# Patient Record
Sex: FEMALE | Race: WHITE | HISPANIC OR LATINO | Employment: FULL TIME | URBAN - METROPOLITAN AREA
[De-identification: names, ages, dates, MRNs, and addresses within clinical notes are randomized per-mention and may not be internally consistent; named-entity substitution may affect disease eponyms.]

---

## 2019-10-18 ENCOUNTER — OFFICE VISIT (OUTPATIENT)
Dept: INTERNAL MEDICINE | Facility: CLINIC | Age: 44
End: 2019-10-18
Payer: COMMERCIAL

## 2019-10-18 VITALS
SYSTOLIC BLOOD PRESSURE: 116 MMHG | HEART RATE: 81 BPM | OXYGEN SATURATION: 98 % | DIASTOLIC BLOOD PRESSURE: 82 MMHG | TEMPERATURE: 98 F | WEIGHT: 158.06 LBS

## 2019-10-18 DIAGNOSIS — Z85.850 HISTORY OF THYROID CANCER: ICD-10-CM

## 2019-10-18 DIAGNOSIS — Z00.00 ROUTINE GENERAL MEDICAL EXAMINATION AT A HEALTH CARE FACILITY: Primary | ICD-10-CM

## 2019-10-18 DIAGNOSIS — G35 MS (MULTIPLE SCLEROSIS): ICD-10-CM

## 2019-10-18 DIAGNOSIS — Z23 NEED FOR INFLUENZA VACCINATION: ICD-10-CM

## 2019-10-18 DIAGNOSIS — E89.0 POSTOPERATIVE HYPOTHYROIDISM: ICD-10-CM

## 2019-10-18 DIAGNOSIS — Z12.31 ENCOUNTER FOR SCREENING MAMMOGRAM FOR BREAST CANCER: ICD-10-CM

## 2019-10-18 PROCEDURE — 90686 FLU VACCINE (QUAD) GREATER THAN OR EQUAL TO 3YO PRESERVATIVE FREE IM: ICD-10-PCS | Mod: S$GLB,,, | Performed by: INTERNAL MEDICINE

## 2019-10-18 PROCEDURE — 90471 IMMUNIZATION ADMIN: CPT | Mod: S$GLB,,, | Performed by: INTERNAL MEDICINE

## 2019-10-18 PROCEDURE — 99386 PR PREVENTIVE VISIT,NEW,40-64: ICD-10-PCS | Mod: 25,S$GLB,, | Performed by: INTERNAL MEDICINE

## 2019-10-18 PROCEDURE — 90471 FLU VACCINE (QUAD) GREATER THAN OR EQUAL TO 3YO PRESERVATIVE FREE IM: ICD-10-PCS | Mod: S$GLB,,, | Performed by: INTERNAL MEDICINE

## 2019-10-18 PROCEDURE — 99999 PR PBB SHADOW E&M-NEW PATIENT-LVL IV: CPT | Mod: PBBFAC,,, | Performed by: INTERNAL MEDICINE

## 2019-10-18 PROCEDURE — 99386 PREV VISIT NEW AGE 40-64: CPT | Mod: 25,S$GLB,, | Performed by: INTERNAL MEDICINE

## 2019-10-18 PROCEDURE — 90686 IIV4 VACC NO PRSV 0.5 ML IM: CPT | Mod: S$GLB,,, | Performed by: INTERNAL MEDICINE

## 2019-10-18 PROCEDURE — 99999 PR PBB SHADOW E&M-NEW PATIENT-LVL IV: ICD-10-PCS | Mod: PBBFAC,,, | Performed by: INTERNAL MEDICINE

## 2019-10-18 RX ORDER — GABAPENTIN 300 MG/1
300 CAPSULE ORAL 3 TIMES DAILY
COMMUNITY
End: 2019-12-30 | Stop reason: SDUPTHER

## 2019-10-18 RX ORDER — LEVOTHYROXINE SODIUM 200 UG/1
200 TABLET ORAL DAILY
COMMUNITY
End: 2019-10-18 | Stop reason: SDUPTHER

## 2019-10-18 RX ORDER — LEVOTHYROXINE SODIUM 200 UG/1
200 TABLET ORAL DAILY
Qty: 90 TABLET | Refills: 1 | Status: SHIPPED | OUTPATIENT
Start: 2019-10-18 | End: 2019-11-18 | Stop reason: SDUPTHER

## 2019-10-18 RX ORDER — BACLOFEN 20 MG/1
20 TABLET ORAL DAILY PRN
Qty: 30 TABLET | Refills: 5 | Status: SHIPPED | OUTPATIENT
Start: 2019-10-18 | End: 2019-12-30 | Stop reason: SDUPTHER

## 2019-10-18 NOTE — PROGRESS NOTES
Subjective:      Patient ID: Nisha Mancuso is a 44 y.o. female.    Chief Complaint: Establish Care    HPI   43 yo with   Patient Active Problem List   Diagnosis    MS (multiple sclerosis)    History of thyroid cancer    Postoperative hypothyroidism     Past Medical History:   Diagnosis Date    Multiple sclerosis     Thyroid cancer      Here today for annual prev exam.  Compliant with meds without significant side effects. Energy and appetite are good.     No f/h of colon or breast cancer. Non smoker.     Review of Systems   Constitutional: Negative for chills and fever.   HENT: Negative for ear pain and sore throat.    Respiratory: Negative for cough.    Cardiovascular: Negative for chest pain.   Gastrointestinal: Negative for abdominal pain and blood in stool.   Genitourinary: Negative for dysuria and hematuria.   Neurological: Negative for seizures and syncope.     Objective:   /82 (BP Location: Left arm, Patient Position: Sitting, BP Method: Medium (Manual))   Pulse 81   Temp 98 °F (36.7 °C) (Tympanic)   Wt 71.7 kg (158 lb 1.1 oz)   LMP 10/01/2019   SpO2 98%     Physical Exam   Constitutional: She is oriented to person, place, and time. She appears well-developed and well-nourished. No distress.   HENT:   Head: Normocephalic and atraumatic.   Mouth/Throat: Oropharynx is clear and moist.   Eyes: Pupils are equal, round, and reactive to light. EOM are normal.   Neck: Neck supple. No thyromegaly present.   Cardiovascular: Normal rate and regular rhythm.   Pulmonary/Chest: Breath sounds normal. She has no wheezes. She has no rales.   Abdominal: Soft. Bowel sounds are normal. There is no tenderness.   Lymphadenopathy:     She has no cervical adenopathy.   Neurological: She is alert and oriented to person, place, and time.   Skin: Skin is warm and dry.   Psychiatric: She has a normal mood and affect. Her behavior is normal.       Assessment:     1. Routine general medical examination at a [facility]  facility    2. MS (multiple sclerosis)    3. Need for influenza vaccination    4. Encounter for screening mammogram for breast cancer    5. History of thyroid cancer    6. Postoperative hypothyroidism      Plan:   Routine general medical examination at a health care facility  Heart healthy diet and regular exercise.  Health maintenance reviewed with patient  -     Comprehensive metabolic panel; Future; Expected date: 10/18/2019  -     CBC auto differential; Future; Expected date: 10/18/2019  -     TSH; Future; Expected date: 10/18/2019  -     Lipid panel; Future; Expected date: 10/18/2019  -     Hemoglobin A1c; Future; Expected date: 10/18/2019  -     Ambulatory referral to Gynecology    MS (multiple sclerosis)  Stable for years.  Has neuro appointment schedule  Need for influenza vaccination    Encounter for screening mammogram for breast cancer  -     Mammo Digital Screening Bilat; Future; Expected date: 10/18/2019    History of thyroid cancer  -     Ambulatory Referral to Oncology    Postoperative hypothyroidism  Reports stable on current dose of thyroid medication for years.    Other orders  -     levothyroxine (SYNTHROID) 200 MCG tablet; Take 1 tablet (200 mcg total) by mouth once daily.  Dispense: 90 tablet; Refill: 1  -     baclofen (LIORESAL) 20 MG tablet; Take 1 tablet (20 mg total) by mouth daily as needed (muscle spasm).  Dispense: 30 tablet; Refill: 05        Lab Frequency Next Occurrence   Comprehensive metabolic panel Once 10/18/2019   CBC auto differential Once 10/18/2019   TSH Once 10/18/2019   Lipid panel Once 10/18/2019   Hemoglobin A1c Once 10/18/2019       Problem List Items Addressed This Visit        Neuro    MS (multiple sclerosis)    Overview     Dx 2012            Oncology    History of thyroid cancer    Relevant Orders    Ambulatory Referral to Oncology       Endocrine    Postoperative hypothyroidism    Current Assessment & Plan     Reports stable on 200mcg for years.            Other Visit  Diagnoses     Routine general medical examination at a health care facility    -  Primary    Relevant Orders    Comprehensive metabolic panel    CBC auto differential    TSH    Lipid panel    Hemoglobin A1c    Ambulatory referral to Gynecology    Need for influenza vaccination        Encounter for screening mammogram for breast cancer        Relevant Orders    Mammo Digital Screening Bilat          Follow up in about 6 months (around 4/18/2020), or if symptoms worsen or fail to improve.

## 2019-10-21 ENCOUNTER — LAB VISIT (OUTPATIENT)
Dept: LAB | Facility: HOSPITAL | Age: 44
End: 2019-10-21
Attending: INTERNAL MEDICINE
Payer: COMMERCIAL

## 2019-10-21 DIAGNOSIS — Z00.00 ROUTINE GENERAL MEDICAL EXAMINATION AT A HEALTH CARE FACILITY: ICD-10-CM

## 2019-10-21 LAB
ALBUMIN SERPL BCP-MCNC: 3.9 G/DL (ref 3.5–5.2)
ALP SERPL-CCNC: 70 U/L (ref 55–135)
ALT SERPL W/O P-5'-P-CCNC: 13 U/L (ref 10–44)
ANION GAP SERPL CALC-SCNC: 7 MMOL/L (ref 8–16)
AST SERPL-CCNC: 18 U/L (ref 10–40)
BASOPHILS # BLD AUTO: 0.02 K/UL (ref 0–0.2)
BASOPHILS NFR BLD: 0.3 % (ref 0–1.9)
BILIRUB SERPL-MCNC: 0.3 MG/DL (ref 0.1–1)
BUN SERPL-MCNC: 15 MG/DL (ref 6–20)
CALCIUM SERPL-MCNC: 9.5 MG/DL (ref 8.7–10.5)
CHLORIDE SERPL-SCNC: 106 MMOL/L (ref 95–110)
CHOLEST SERPL-MCNC: 211 MG/DL (ref 120–199)
CHOLEST/HDLC SERPL: 3.4 {RATIO} (ref 2–5)
CO2 SERPL-SCNC: 27 MMOL/L (ref 23–29)
CREAT SERPL-MCNC: 0.8 MG/DL (ref 0.5–1.4)
DIFFERENTIAL METHOD: ABNORMAL
EOSINOPHIL # BLD AUTO: 0.1 K/UL (ref 0–0.5)
EOSINOPHIL NFR BLD: 2 % (ref 0–8)
ERYTHROCYTE [DISTWIDTH] IN BLOOD BY AUTOMATED COUNT: 13.8 % (ref 11.5–14.5)
EST. GFR  (AFRICAN AMERICAN): >60 ML/MIN/1.73 M^2
EST. GFR  (NON AFRICAN AMERICAN): >60 ML/MIN/1.73 M^2
ESTIMATED AVG GLUCOSE: 103 MG/DL (ref 68–131)
GLUCOSE SERPL-MCNC: 83 MG/DL (ref 70–110)
HBA1C MFR BLD HPLC: 5.2 % (ref 4–5.6)
HCT VFR BLD AUTO: 39.2 % (ref 37–48.5)
HDLC SERPL-MCNC: 62 MG/DL (ref 40–75)
HDLC SERPL: 29.4 % (ref 20–50)
HGB BLD-MCNC: 12.3 G/DL (ref 12–16)
IMM GRANULOCYTES # BLD AUTO: 0.01 K/UL (ref 0–0.04)
IMM GRANULOCYTES NFR BLD AUTO: 0.2 % (ref 0–0.5)
LDLC SERPL CALC-MCNC: 133 MG/DL (ref 63–159)
LYMPHOCYTES # BLD AUTO: 1.4 K/UL (ref 1–4.8)
LYMPHOCYTES NFR BLD: 22.4 % (ref 18–48)
MCH RBC QN AUTO: 29.3 PG (ref 27–31)
MCHC RBC AUTO-ENTMCNC: 31.4 G/DL (ref 32–36)
MCV RBC AUTO: 93 FL (ref 82–98)
MONOCYTES # BLD AUTO: 0.6 K/UL (ref 0.3–1)
MONOCYTES NFR BLD: 10 % (ref 4–15)
NEUTROPHILS # BLD AUTO: 4 K/UL (ref 1.8–7.7)
NEUTROPHILS NFR BLD: 65.1 % (ref 38–73)
NONHDLC SERPL-MCNC: 149 MG/DL
NRBC BLD-RTO: 0 /100 WBC
PLATELET # BLD AUTO: 303 K/UL (ref 150–350)
PMV BLD AUTO: 10 FL (ref 9.2–12.9)
POTASSIUM SERPL-SCNC: 4.4 MMOL/L (ref 3.5–5.1)
PROT SERPL-MCNC: 7.9 G/DL (ref 6–8.4)
RBC # BLD AUTO: 4.2 M/UL (ref 4–5.4)
SODIUM SERPL-SCNC: 140 MMOL/L (ref 136–145)
T4 FREE SERPL-MCNC: 1.38 NG/DL (ref 0.71–1.51)
TRIGL SERPL-MCNC: 80 MG/DL (ref 30–150)
TSH SERPL DL<=0.005 MIU/L-ACNC: 0.32 UIU/ML (ref 0.4–4)
WBC # BLD AUTO: 6.12 K/UL (ref 3.9–12.7)

## 2019-10-21 PROCEDURE — 84439 ASSAY OF FREE THYROXINE: CPT

## 2019-10-21 PROCEDURE — 36415 COLL VENOUS BLD VENIPUNCTURE: CPT

## 2019-10-21 PROCEDURE — 83036 HEMOGLOBIN GLYCOSYLATED A1C: CPT

## 2019-10-21 PROCEDURE — 85025 COMPLETE CBC W/AUTO DIFF WBC: CPT

## 2019-10-21 PROCEDURE — 80061 LIPID PANEL: CPT

## 2019-10-21 PROCEDURE — 80053 COMPREHEN METABOLIC PANEL: CPT

## 2019-10-21 PROCEDURE — 84443 ASSAY THYROID STIM HORMONE: CPT

## 2019-10-28 ENCOUNTER — TELEPHONE (OUTPATIENT)
Dept: INTERNAL MEDICINE | Facility: CLINIC | Age: 44
End: 2019-10-28

## 2019-10-28 DIAGNOSIS — R79.89 ABNORMAL TSH: Primary | ICD-10-CM

## 2019-11-08 ENCOUNTER — PATIENT MESSAGE (OUTPATIENT)
Dept: INTERNAL MEDICINE | Facility: CLINIC | Age: 44
End: 2019-11-08

## 2019-11-18 ENCOUNTER — OFFICE VISIT (OUTPATIENT)
Dept: OBSTETRICS AND GYNECOLOGY | Facility: CLINIC | Age: 44
End: 2019-11-18
Payer: COMMERCIAL

## 2019-11-18 ENCOUNTER — INITIAL CONSULT (OUTPATIENT)
Dept: HEMATOLOGY/ONCOLOGY | Facility: CLINIC | Age: 44
End: 2019-11-18
Payer: COMMERCIAL

## 2019-11-18 VITALS
TEMPERATURE: 98 F | WEIGHT: 157.88 LBS | SYSTOLIC BLOOD PRESSURE: 132 MMHG | HEART RATE: 83 BPM | DIASTOLIC BLOOD PRESSURE: 78 MMHG | BODY MASS INDEX: 27.09 KG/M2

## 2019-11-18 VITALS
BODY MASS INDEX: 27.03 KG/M2 | HEIGHT: 64 IN | SYSTOLIC BLOOD PRESSURE: 120 MMHG | WEIGHT: 158.31 LBS | DIASTOLIC BLOOD PRESSURE: 60 MMHG

## 2019-11-18 DIAGNOSIS — Z01.419 ENCOUNTER FOR WELL WOMAN EXAM WITH ROUTINE GYNECOLOGICAL EXAM: Primary | ICD-10-CM

## 2019-11-18 DIAGNOSIS — Z85.850 HISTORY OF THYROID CANCER: ICD-10-CM

## 2019-11-18 PROCEDURE — 99386 PR PREVENTIVE VISIT,NEW,40-64: ICD-10-PCS | Mod: S$GLB,,, | Performed by: OBSTETRICS & GYNECOLOGY

## 2019-11-18 PROCEDURE — 99999 PR PBB SHADOW E&M-EST. PATIENT-LVL III: CPT | Mod: PBBFAC,,, | Performed by: OBSTETRICS & GYNECOLOGY

## 2019-11-18 PROCEDURE — 3008F BODY MASS INDEX DOCD: CPT | Mod: CPTII,S$GLB,, | Performed by: INTERNAL MEDICINE

## 2019-11-18 PROCEDURE — 88175 CYTOPATH C/V AUTO FLUID REDO: CPT

## 2019-11-18 PROCEDURE — 99999 PR PBB SHADOW E&M-EST. PATIENT-LVL III: CPT | Mod: PBBFAC,,, | Performed by: INTERNAL MEDICINE

## 2019-11-18 PROCEDURE — 99386 PREV VISIT NEW AGE 40-64: CPT | Mod: S$GLB,,, | Performed by: OBSTETRICS & GYNECOLOGY

## 2019-11-18 PROCEDURE — 3008F PR BODY MASS INDEX (BMI) DOCUMENTED: ICD-10-PCS | Mod: CPTII,S$GLB,, | Performed by: INTERNAL MEDICINE

## 2019-11-18 PROCEDURE — 99999 PR PBB SHADOW E&M-EST. PATIENT-LVL III: ICD-10-PCS | Mod: PBBFAC,,, | Performed by: OBSTETRICS & GYNECOLOGY

## 2019-11-18 PROCEDURE — 99205 OFFICE O/P NEW HI 60 MIN: CPT | Mod: S$GLB,,, | Performed by: INTERNAL MEDICINE

## 2019-11-18 PROCEDURE — 99999 PR PBB SHADOW E&M-EST. PATIENT-LVL III: ICD-10-PCS | Mod: PBBFAC,,, | Performed by: INTERNAL MEDICINE

## 2019-11-18 PROCEDURE — 99205 PR OFFICE/OUTPT VISIT, NEW, LEVL V, 60-74 MIN: ICD-10-PCS | Mod: S$GLB,,, | Performed by: INTERNAL MEDICINE

## 2019-11-18 NOTE — PROGRESS NOTES
Subjective:   Date of Visit: 11/18/19   ?   ?    REFERRING PROVIDER: Kit Apple MD  [address] [place] [address]   ?   CHIEF COMPLAINT:  History of thyroid cancer???????   ?   ONCOLOGIC DIAGNOSIS:  Thyroid cancer  ?   CURRENT TREATMENT:  None    PAST TREATMENT:  Total thyroidectomy, status post radioactive iodine treatment  ?   ONCOLOGIC HISTORY:     Thyroid cancer -2011.  Status post total thyroidectomy and radioactive iodine.      HPI:   was seen at [facility] today.  She is a 44-year-old female who was referred to us by  due to her prior history of thyroid cancer.  The patient stated that in 2011 she was 3 months pregnant with her daughter when she was diagnosed with thyroid cancer.  She subsequently underwent surgery and radioactive iodine treatment with complete response.  She was lost to follow-up.  She was recently seen by her primary care physician who recommended that she follows up with the Hematology and Oncology Clinic.    She continues to take levothyroxine but claims that over the past few months she has observed increased dryness of scan, head loss, abdominal pain and bloating, cold intolerance and difficulty with sleeping.  She denies palpitation, chest pain or shortness of breath, unintentional weight loss, dysphagia, odynophagia, diarrhea or dysuria.    Review of Systems   Constitutional: Negative for activity change, appetite change, chills, fatigue, fever and unexpected weight change.   HENT: Negative for hearing loss, mouth sores, nosebleeds, sore throat, tinnitus, trouble swallowing and voice change.    Eyes: Negative for visual disturbance.   Respiratory: Negative for cough, chest tightness and shortness of breath.    Cardiovascular: Negative for chest pain, palpitations and leg swelling.   Gastrointestinal: Positive for constipation. Negative for abdominal pain, anal bleeding, blood in stool, diarrhea, nausea and vomiting.        Abdominal bloating,  constipation.   Endocrine: Positive for cold intolerance.   Genitourinary: Negative for dysuria, frequency, hematuria, pelvic pain, vaginal bleeding and vaginal pain.   Musculoskeletal: Negative for arthralgias, back pain, joint swelling and neck pain.   Skin: Negative for color change, pallor, rash and wound.        Dry skin   Allergic/Immunologic: Negative for immunocompromised state.   Neurological: Negative for dizziness, tremors, syncope, speech difficulty, weakness, light-headedness and headaches.   Hematological: Negative for adenopathy. Does not bruise/bleed easily.   Psychiatric/Behavioral: Negative for agitation, confusion, decreased concentration, hallucinations and sleep disturbance. The patient is not nervous/anxious.        ?   PAST MEDICAL HISTORY:   Past Medical History:   Diagnosis Date    Multiple sclerosis     Thyroid cancer     ?     PAST SURGICAL HISTORY:   Past Surgical History:   Procedure Laterality Date    THYROIDECTOMY        ?   ALLERGIES:   Allergies as of 11/18/2019    (No Known Allergies)      ?   MEDICATIONS:?   No outpatient medications have been marked as taking for the 11/18/19 encounter (Initial consult) with Souleymane Sanchez MD.      ?   SOCIAL HISTORY:?   Social History     Tobacco Use    Smoking status: Never Smoker    Smokeless tobacco: Never Used   Substance Use Topics    Alcohol use: Not Currently        ?   FAMILY HISTORY:   family history includes Arthritis in her mother; Hypertension in her father.   ?     Objective:      Physical Exam   Constitutional: She is oriented to person, place, and time. She appears well-developed and well-nourished. She is cooperative.  Non-toxic appearance. She does not appear ill. No distress.   HENT:   Head: Normocephalic and atraumatic.   Mouth/Throat: No oropharyngeal exudate.   Eyes: Pupils are equal, round, and reactive to light. Conjunctivae are normal. Right eye exhibits no discharge. Left eye exhibits no discharge. No scleral  icterus.   Neck: Normal range of motion. Neck supple. No thyromegaly present.   Cardiovascular: Normal rate and regular rhythm.   No murmur heard.  Pulmonary/Chest: Effort normal and breath sounds normal. No respiratory distress. She exhibits no tenderness.   Abdominal: Soft. Bowel sounds are normal. She exhibits no distension and no mass. There is no tenderness. There is no rebound and no guarding.   Musculoskeletal: Normal range of motion. She exhibits no edema or tenderness.   Lymphadenopathy:     She has no cervical adenopathy.        Right cervical: No superficial cervical adenopathy present.       Left cervical: No superficial cervical adenopathy present.        Right axillary: No pectoral adenopathy present.        Left axillary: No pectoral adenopathy present.       Right: No inguinal and no supraclavicular adenopathy present.        Left: No inguinal and no supraclavicular adenopathy present.   Neurological: She is alert and oriented to person, place, and time. No cranial nerve deficit or sensory deficit.   Skin: Skin is warm and dry. Capillary refill takes 2 to 3 seconds. No rash noted. No erythema. No pallor.   Psychiatric: She has a normal mood and affect. Her behavior is normal. Judgment normal.       ?   Vitals:    11/18/19 1600   BP: 132/78   Pulse: 83   Temp: 98.1 °F (36.7 °C)        ECOG SCORE    0 - Fully active-able to carry on all pre-disease performance without restriction         ?   Laboratory:  ?   No visits with results within 1 Day(s) from this visit.   Latest known visit with results is:   Lab Visit on 10/21/2019   Component Date Value Ref Range Status    Sodium 10/21/2019 140  136 - 145 mmol/L Final    Potassium 10/21/2019 4.4  3.5 - 5.1 mmol/L Final    Chloride 10/21/2019 106  95 - 110 mmol/L Final    CO2 10/21/2019 27  23 - 29 mmol/L Final    Glucose 10/21/2019 83  70 - 110 mg/dL Final    BUN, Bld 10/21/2019 15  6 - 20 mg/dL Final    Creatinine 10/21/2019 0.8  0.5 - 1.4 mg/dL  Final    Calcium 10/21/2019 9.5  8.7 - 10.5 mg/dL Final    Total Protein 10/21/2019 7.9  6.0 - 8.4 g/dL Final    Albumin 10/21/2019 3.9  3.5 - 5.2 g/dL Final    Total Bilirubin 10/21/2019 0.3  0.1 - 1.0 mg/dL Final    Alkaline Phosphatase 10/21/2019 70  55 - 135 U/L Final    AST 10/21/2019 18  10 - 40 U/L Final    ALT 10/21/2019 13  10 - 44 U/L Final    Anion Gap 10/21/2019 7* 8 - 16 mmol/L Final    eGFR if African American 10/21/2019 >60.0  >60 mL/min/1.73 m^2 Final    eGFR if non African American 10/21/2019 >60.0  >60 mL/min/1.73 m^2 Final    WBC 10/21/2019 6.12  3.90 - 12.70 K/uL Final    RBC 10/21/2019 4.20  4.00 - 5.40 M/uL Final    Hemoglobin 10/21/2019 12.3  12.0 - 16.0 g/dL Final    Hematocrit 10/21/2019 39.2  37.0 - 48.5 % Final    Mean Corpuscular Volume 10/21/2019 93  82 - 98 fL Final    Mean Corpuscular Hemoglobin 10/21/2019 29.3  27.0 - 31.0 pg Final    Mean Corpuscular Hemoglobin Conc 10/21/2019 31.4* 32.0 - 36.0 g/dL Final    RDW 10/21/2019 13.8  11.5 - 14.5 % Final    Platelets 10/21/2019 303  150 - 350 K/uL Final    MPV 10/21/2019 10.0  9.2 - 12.9 fL Final    Immature Granulocytes 10/21/2019 0.2  0.0 - 0.5 % Final    Gran # (ANC) 10/21/2019 4.0  1.8 - 7.7 K/uL Final    Immature Grans (Abs) 10/21/2019 0.01  0.00 - 0.04 K/uL Final    Lymph # 10/21/2019 1.4  1.0 - 4.8 K/uL Final    Mono # 10/21/2019 0.6  0.3 - 1.0 K/uL Final    Eos # 10/21/2019 0.1  0.0 - 0.5 K/uL Final    Baso # 10/21/2019 0.02  0.00 - 0.20 K/uL Final    nRBC 10/21/2019 0  0 /100 WBC Final    Gran% 10/21/2019 65.1  38.0 - 73.0 % Final    Lymph% 10/21/2019 22.4  18.0 - 48.0 % Final    Mono% 10/21/2019 10.0  4.0 - 15.0 % Final    Eosinophil% 10/21/2019 2.0  0.0 - 8.0 % Final    Basophil% 10/21/2019 0.3  0.0 - 1.9 % Final    Differential Method 10/21/2019 Automated   Final    TSH 10/21/2019 0.321* 0.400 - 4.000 uIU/mL Final    Cholesterol 10/21/2019 211* 120 - 199 mg/dL Final    Triglycerides  10/21/2019 80  30 - 150 mg/dL Final    HDL 10/21/2019 62  40 - 75 mg/dL Final    LDL Cholesterol 10/21/2019 133.0  63.0 - 159.0 mg/dL Final    Hdl/Cholesterol Ratio 10/21/2019 29.4  20.0 - 50.0 % Final    Total Cholesterol/HDL Ratio 10/21/2019 3.4  2.0 - 5.0 Final    Non-HDL Cholesterol 10/21/2019 149  mg/dL Final    Hemoglobin A1C 10/21/2019 5.2  4.0 - 5.6 % Final    Estimated Avg Glucose 10/21/2019 103  68 - 131 mg/dL Final    Free T4 10/21/2019 1.38  0.71 - 1.51 ng/dL Final      ?   Tumor markers   ?   ?   Imaging: No image results found.     ?    Pathology:  Pathology Results  (Last 10 years)    None         ?   Assessment/Plan:       1. History of thyroid cancer          ?History of thyroid cancer  Will sign a release of information form to obtain prior records regarding thyroid cancer and management.  Will order ultrasound of the neck and soft tissue.  Check CBC and CMP.    Currently on levothyroxine 200 mcg daily, management per PCP.     ?   ?   Follow-Up: Follow up in about 4 weeks (around 12/16/2019).    FUENTES CUMMINGS Md., Ph.D  Hematology & Oncology Department  Phone #: [phone]

## 2019-11-18 NOTE — PROGRESS NOTES
"Subjective:      Nisha Mancuso is a 44 y.o. female who presents for an annual exam. The patient has no complaints today. The patient is sexually active. GYN screening history: last pap: patient does not recall when last pap was and last mammogram: approximate date 2 years ago and was normal. The patient wears seatbelts: yes. The patient participates in regular exercise: yes. Has the patient ever been transfused or tattooed?: not asked. The patient reports that there is not domestic violence in her life. [facility].  Patient has regular periods, patient is not using any contraception.   is in [facility], distance and abstinece.  No vasectomy, uses natural family planning.  Patient has MS, has had it for 7 years.  Patient has had cancer and MS. Patient has a neurologist here, will be seen 12/30/2019.        Menstrual History:  OB History    None        Menarche age: not asked  Patient's last menstrual period was 10/02/2019 (exact date).           Review of Systems  Constitutional: negative  Eyes: negative  Ears, nose, mouth, throat, and face: negative  Respiratory: negative  Cardiovascular: negative  Gastrointestinal: negative  Genitourinary:negative  Integument/breast: negative  Hematologic/lymphatic: negative  Musculoskeletal:negative  Neurological: negative  Behavioral/Psych: negative  Endocrine: negative  Allergic/Immunologic: negative      Objective:      /60   Ht 5' 4" (1.626 m)   Wt 71.8 kg (158 lb 4.6 oz)   LMP 10/02/2019 (Exact Date)   BMI 27.17 kg/m²   General appearance: alert, appears stated age and cooperative  Head: Normocephalic, without obvious abnormality, atraumatic  Eyes: negative  Nose: no discharge  Neck: no adenopathy, no carotid bruit, no JVD, supple, symmetrical, trachea midline and thyroid not enlarged, symmetric, no tenderness/mass/nodules  Lungs: clear to auscultation bilaterally  Heart: S1, S2 normal and " no S3 or S4  Abdomen: soft, non-tender; bowel sounds normal; no masses,  no organomegaly  Pelvic: cervix normal in appearance, external genitalia normal, no adnexal masses or tenderness, no bladder tenderness, no cervical motion tenderness, perianal skin: no external genital warts noted, rectovaginal septum normal, uterus normal size, shape, and consistency, uterus surgically absent and vagina normal without discharge  Extremities: extremities normal, atraumatic, no cyanosis or edema  Skin: Skin color, texture, turgor normal. No rashes or lesions  Neurologic: Grossly normal.      Assessment:      Healthy female exam.      Plan:       Await pap smear results.  Contraception: rhythm method.  Thin prep Pap smear.  mammogram ordered by primary care physician

## 2019-11-18 NOTE — ASSESSMENT & PLAN NOTE
Will sign a release of information form to obtain prior records regarding thyroid cancer and management.  Will order ultrasound of the neck and soft tissue.  Check CBC and CMP.    Currently on levothyroxine 200 mcg daily, management per PCP.

## 2019-11-18 NOTE — LETTER
November 18, 2019      Kit Apple MD  [address] [facility] [place] [address]           [facility]  [address] [facility] [place] [address]  Phone: [phone]  Fax: [phone]          Patient: Nisha Mancuso   MR Number: [id number]   YOB: 1975   Date of Visit: 11/18/2019       Dear Dr. Kit Apple:    Thank you for referring Nisha Mancuso to me for evaluation. Attached you will find relevant portions of my assessment and plan of care.    If you have questions, please do not hesitate to call me. I look forward to following Nisha Mancuso along with you.    Sincerely,    Souleymane Sanchez MD    Enclosure  CC:  No Recipients    If you would like to receive this communication electronically, please contact [email or web address] or [phone] to request more information on [employer] Link access.    For providers and/or their staff who would like to refer a patient to [facility], please contact us through our one-stop-shop provider referral line, [facility], at [phone].    If you feel you have received this communication in error or would no longer like to receive these types of communications, please e-mail [email or web address]

## 2019-11-18 NOTE — LETTER
November 18, 2019      Kit Apple MD  [address] [facility]  [place] [address]           [facility]  [address] [facility] [place] [address]  Phone: [phone]  Fax: [phone]          Patient: Nisha Mancuso   MR Number: [id number]   YOB: 1975   Date of Visit: 11/18/2019       Dear Dr. Kit Apple:    Thank you for referring Nisha Mancuso to me for evaluation. Attached you will find relevant portions of my assessment and plan of care.    If you have questions, please do not hesitate to call me. I look forward to following Nisha Mancuso along with you.    Sincerely,    Shoshana Madera MD    Enclosure  CC:  No Recipients    If you would like to receive this communication electronically, please contact [email or web address] or [phone] to request more information on [employer] Link access.    For providers and/or their staff who would like to refer a patient to [facility], please contact us through our one-stop-shop provider referral line, [facility], at [phone].    If you feel you have received this communication in error or would no longer like to receive these types of communications, please e-mail [email or web address]

## 2019-11-20 RX ORDER — LEVOTHYROXINE SODIUM 200 UG/1
200 TABLET ORAL DAILY
Qty: 90 TABLET | Refills: 1 | Status: SHIPPED | OUTPATIENT
Start: 2019-11-20 | End: 2019-11-22

## 2019-11-22 ENCOUNTER — TELEPHONE (OUTPATIENT)
Dept: ENDOCRINOLOGY | Facility: CLINIC | Age: 44
End: 2019-11-22

## 2019-11-22 ENCOUNTER — OFFICE VISIT (OUTPATIENT)
Dept: ENDOCRINOLOGY | Facility: CLINIC | Age: 44
End: 2019-11-22
Payer: COMMERCIAL

## 2019-11-22 VITALS — DIASTOLIC BLOOD PRESSURE: 77 MMHG | HEART RATE: 77 BPM | TEMPERATURE: 98 F | SYSTOLIC BLOOD PRESSURE: 124 MMHG

## 2019-11-22 DIAGNOSIS — Z85.850 HISTORY OF THYROID CANCER: ICD-10-CM

## 2019-11-22 DIAGNOSIS — R63.5 WEIGHT GAIN: ICD-10-CM

## 2019-11-22 DIAGNOSIS — E89.0 POSTOPERATIVE HYPOTHYROIDISM: Primary | ICD-10-CM

## 2019-11-22 PROCEDURE — 99999 PR PBB SHADOW E&M-EST. PATIENT-LVL III: CPT | Mod: PBBFAC,,, | Performed by: INTERNAL MEDICINE

## 2019-11-22 PROCEDURE — 99203 OFFICE O/P NEW LOW 30 MIN: CPT | Mod: S$GLB,,, | Performed by: INTERNAL MEDICINE

## 2019-11-22 PROCEDURE — 99203 PR OFFICE/OUTPT VISIT, NEW, LEVL III, 30-44 MIN: ICD-10-PCS | Mod: S$GLB,,, | Performed by: INTERNAL MEDICINE

## 2019-11-22 PROCEDURE — 99999 PR PBB SHADOW E&M-EST. PATIENT-LVL III: ICD-10-PCS | Mod: PBBFAC,,, | Performed by: INTERNAL MEDICINE

## 2019-11-22 RX ORDER — LEVOTHYROXINE SODIUM 200 UG/1
TABLET ORAL
Qty: 17 TABLET | Refills: 2 | Status: SHIPPED | OUTPATIENT
Start: 2019-11-22 | End: 2019-11-29

## 2019-11-22 RX ORDER — LEVOTHYROXINE SODIUM 175 UG/1
TABLET ORAL
Qty: 13 TABLET | Refills: 2 | Status: CANCELLED | OUTPATIENT
Start: 2019-11-22

## 2019-11-22 RX ORDER — LEVOTHYROXINE SODIUM 175 UG/1
TABLET ORAL
Qty: 13 TABLET | Refills: 2 | Status: SHIPPED | OUTPATIENT
Start: 2019-11-22 | End: 2019-11-29

## 2019-11-22 NOTE — PROGRESS NOTES
PCP:  Kit Apple MD    CC:  Thyroid cancer history  Weight gain    HPI:  Nisha Mancuso 44 y.o. 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    Tired, too cold, loosing hair,  and diarrhea x a month, and weight gain 15 Lbs  History of taking Neurontin with last dose 7 months ago    ? history of MS x years      No DM  Nobody obese in family    Working as .    Past Medical History:   Diagnosis Date    Multiple sclerosis     Thyroid cancer        Past Surgical History:   Procedure Laterality Date    THYROIDECTOMY         Social History     Socioeconomic History    Marital status:      Spouse name: Not on file    Number of children: Not on file    Years of education: Not on file    Highest education level: Not on file   Occupational History    Not on file   Social Needs    Financial resource strain: Not on file    Food insecurity:     Worry: Not on file     Inability: Not on file    Transportation needs:     Medical: Not on file     Non-medical: Not on file   Tobacco Use    Smoking status: Never Smoker    Smokeless tobacco: Never Used   Substance and Sexual Activity    Alcohol use: Not Currently    Drug use: Never    Sexual activity: Yes   Lifestyle    Physical activity:     Days per week: Not on file     Minutes per session: Not on file    Stress: Not on file   Relationships    Social connections:     Talks on phone: Not on file     Gets together: Not on file     Attends Confucianism service: Not on file     Active member of club or organization: Not on file     Attends meetings of clubs or organizations: Not on file     Relationship status: Not on file    Other Topics Concern    Not on file   Social History Narrative    Not on file         ROS:   Thyroid cancer  Weight gain  ROS otherwise neg except for what is mentioned in the PMH, PSH and HPI     PE:  vss  Alert and oriented  No acute distress  No acne  No Proptosis or conjunctivitis  No goitre by inspection  Thyroid gland is not palpable  No cervical lymphadenopathy  Heart reg, no gallop  Lungs cta, no wheezing  Abd soft, no tnd  No edema in lower legs  No rash  No bruises  Speech normal  Behavior normal  No tremor        Lab:    Lab Results   Component Value Date    TSH 0.321 (L) 10/21/2019    FREET4 1.38 10/21/2019       No components found for: AGBA1C  Lab Results   Component Value Date    CHOL 211 (H) 10/21/2019    TRIG 80 10/21/2019    HDL 62 10/21/2019    CHOLHDL 29.4 10/21/2019    TOTALCHOLEST 3.4 10/21/2019    NONHDLCHOL 149 10/21/2019     BMP  Lab Results   Component Value Date     10/21/2019    K 4.4 10/21/2019     10/21/2019    CO2 27 10/21/2019    BUN 15 10/21/2019    CREATININE 0.8 10/21/2019    CALCIUM 9.5 10/21/2019    ANIONGAP 7 (L) 10/21/2019    ESTGFRAFRICA >60.0 10/21/2019    EGFRNONAA >60.0 10/21/2019     A/P:  Postoperative hypothyroidism  History of thyroid cancer  Thyroid US is pending  Weight gain could be multifactorial,?  Mainly secondary to taking Neurontin (until 7 months ago)    -     Discontinue: SYNTHROID 175 mcg tablet; Qd on Mon, Wed, and Fri  Dispense: 13 tablet; Refill: 2  -     Discontinue: SYNTHROID 200 mcg tablet; Qd on Tues, Thur, Sat and Sun  Dispense: 17 tablet; Refill:    Levothyroxine dose to be reduced.  Levothyroxine 150 mcg daily.  Appt for follow up in 7 weeks.      Pt understands the plan and instructions.

## 2019-11-29 ENCOUNTER — OFFICE VISIT (OUTPATIENT)
Dept: ENDOCRINOLOGY | Facility: CLINIC | Age: 44
End: 2019-11-29
Payer: COMMERCIAL

## 2019-11-29 VITALS
HEART RATE: 64 BPM | DIASTOLIC BLOOD PRESSURE: 72 MMHG | WEIGHT: 152.75 LBS | SYSTOLIC BLOOD PRESSURE: 116 MMHG | BODY MASS INDEX: 26.08 KG/M2 | RESPIRATION RATE: 18 BRPM | HEIGHT: 64 IN

## 2019-11-29 DIAGNOSIS — E89.0 POSTOPERATIVE HYPOTHYROIDISM: ICD-10-CM

## 2019-11-29 DIAGNOSIS — R79.89 ABNORMAL THYROID BLOOD TEST: ICD-10-CM

## 2019-11-29 DIAGNOSIS — Z85.850 HISTORY OF THYROID CANCER: Primary | ICD-10-CM

## 2019-11-29 DIAGNOSIS — R63.5 WEIGHT GAIN: ICD-10-CM

## 2019-11-29 LAB
FINAL PATHOLOGIC DIAGNOSIS: NORMAL
Lab: NORMAL

## 2019-11-29 PROCEDURE — 99214 PR OFFICE/OUTPT VISIT, EST, LEVL IV, 30-39 MIN: ICD-10-PCS | Mod: S$GLB,,, | Performed by: INTERNAL MEDICINE

## 2019-11-29 PROCEDURE — 99999 PR PBB SHADOW E&M-EST. PATIENT-LVL III: ICD-10-PCS | Mod: PBBFAC,,, | Performed by: INTERNAL MEDICINE

## 2019-11-29 PROCEDURE — 3008F PR BODY MASS INDEX (BMI) DOCUMENTED: ICD-10-PCS | Mod: CPTII,S$GLB,, | Performed by: INTERNAL MEDICINE

## 2019-11-29 PROCEDURE — 99214 OFFICE O/P EST MOD 30 MIN: CPT | Mod: S$GLB,,, | Performed by: INTERNAL MEDICINE

## 2019-11-29 PROCEDURE — 99999 PR PBB SHADOW E&M-EST. PATIENT-LVL III: CPT | Mod: PBBFAC,,, | Performed by: INTERNAL MEDICINE

## 2019-11-29 PROCEDURE — 3008F BODY MASS INDEX DOCD: CPT | Mod: CPTII,S$GLB,, | Performed by: INTERNAL MEDICINE

## 2019-11-29 RX ORDER — LEVOTHYROXINE SODIUM 150 UG/1
150 TABLET ORAL DAILY
Qty: 30 TABLET | Refills: 1 | Status: SHIPPED | OUTPATIENT
Start: 2019-11-29 | End: 2020-01-06 | Stop reason: SDUPTHER

## 2019-11-29 NOTE — ASSESSMENT & PLAN NOTE
Unclear why LT4 dose was increased from 100 to 200 mcg daily when she was stable on 100 mcg dose for many years.  Weight based dosing ~112 mcg daily.   Currently still taking 200 mcg daily with only mildly suppressed TSH and normal fT4.  Will decrease to LT4 150 mcg daily and recheck TSH in 6 wks.

## 2019-11-29 NOTE — ASSESSMENT & PLAN NOTE
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    Will have her try to find path records and previous Tg/Ab labs.     Will check Tg and TgAb.  She is scheduled for surveillance neck US in 12/2020.

## 2019-11-29 NOTE — ASSESSMENT & PLAN NOTE
We discussed that some of her symptoms may be related to over replacement.  But weight gain not consistent w/ over replacement.    Encourage continued healthy diet and increased physical activity.

## 2019-11-29 NOTE — PROGRESS NOTES
ENDOCRINOLOGY FOLLOW UP  11/29/2019    CC: follow up for PTC and postsurgical hypothyroidism    She was seen on 11/22/19 Dr. Hernandez (endocrine) and would like to transfer care here.    HPI:  Ms. Pringle is a 44 y.o. female w/ hx of PTC s/p thyroidectomy in 2011 and JOYA in 2012 with postsurgical hypothyroidism and MS who is here to establish care.    Thyroid Hx:  Previously managed by endocrine in [place], moved to [facility] in 3/2019.      I have reviewed her outside records from [facility] and to summarize:  Thyroid cancer in 2011 diagnosed in [facility] during pregnancy when GYN found thyroid nodule.  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  Has not had repeat US.      Was on Levothyroxine 200 mcg for about 6 months and prior to that was on 100 mcg since thyroidectomy.  For unclear reasons dose was increased from 100  To 200 mcg LT4 in [place] prior to move.  Had labs w/ new PCP in 10/21/19 with suppressed TSH and normal fT4.  Was told to decrease dose but hasn't done so yet.       Risk Factors for Thyroid Cancer:  Hx of External Beam Radiation: no  Family Hx of Thyroid Cancer: none    Recent TSH:    Lab Results   Component Value Date    TSH 0.321 (L) 10/21/2019    FREET4 1.38 10/21/2019      Thyroid Symptoms    Weight change:  [x]  Gain []  Loss  []  Denies  [x]  fatigue     Up 10 lbs over the past 2-3 months with no changes to lifestyle.  Eats healthy    Temperature intolerance:  [x]  Cold (new) []  Hot   []  Denies     GI:  []  Diarrhea [x]  Constipation x 2 mo []  Denies    Integument:  [x]  Hair loss []  Dry skin  []  Denies  Past couple months    Other:  []  Palpitation []  tremor     []   Increased anxiety    [x]  Denies      Past Medical History:   Diagnosis Date    Multiple sclerosis     Thyroid cancer        Past Surgical History:   Procedure Laterality Date    THYROIDECTOMY         Review of patient's allergies indicates:  No Known Allergies      Current Outpatient Medications:     baclofen (LIORESAL) 20 MG tablet, Take 1 tablet (20 mg total) by mouth daily as needed (muscle spasm)., Disp: 30 tablet, Rfl: 05    interferon beta-1b (BETASERON SUBQ), Inject into the skin every other day., Disp: , Rfl:     gabapentin (NEURONTIN) 300 MG capsule, Take 300 mg by mouth 3 (three) times daily., Disp: , Rfl:     levothyroxine (SYNTHROID) 150 MCG tablet, Take 1 tablet (150 mcg total) by mouth once daily., Disp: 30 tablet, Rfl: 1    Social History     Socioeconomic History    Marital status:      Spouse name: Not on file    Number of children: Not on file    Years of education: Not on file    Highest education level: Not on file   Occupational History    Not on file   Social Needs    Financial resource strain: Not on file    Food insecurity:     Worry: Not on file     Inability: Not on file    Transportation needs:     Medical: Not on file     Non-medical: Not on file   Tobacco Use    Smoking status: Never Smoker    Smokeless tobacco: Never Used   Substance and Sexual Activity    Alcohol use: Not Currently    Drug use: Never    Sexual activity: Yes   Lifestyle    Physical activity:     Days per week: Not on file     Minutes per session: Not on file    Stress: Not on file   Relationships    Social connections:     Talks on phone: Not on file     Gets together: Not on file     Attends Scientology service: Not on file     Active member of club or organization: Not on file     Attends meetings of clubs or organizations: Not on file     Relationship status: Not on file   Other Topics Concern    Not on file   Social History Narrative    Not on file       Family History   Problem  "Relation Age of Onset    Arthritis Mother     Hypertension Father        REVIEW OF SYSTEMS  Constitutional: + wt gain. + fatigue  Eyes: No blurry vision, loss of vision, diplopia.  ENT: No voice alterations, no problems swallowing.  Thyroid: No masses in neck area.  CV: No chest pain, palpitations, no swelling of the lower extremities.  Pulm: No cough, no shortness of breath, no wheezing.  Neuro: . Denies tremors.    PHYSICAL EXAM  /72 (BP Location: Left arm, Patient Position: Sitting, BP Method: Small (Manual))   Pulse 64   Resp 18   Ht 5' 4" (1.626 m)   Wt 69.3 kg (152 lb 12.5 oz)   LMP 11/26/2019   BMI 26.22 kg/m²   Wt Readings from Last 3 Encounters:   11/29/19 69.3 kg (152 lb 12.5 oz)   11/18/19 71.6 kg (157 lb 13.6 oz)   11/18/19 71.8 kg (158 lb 4.6 oz)   ]    Constitutional:  Pleasant,  in no acute distress.   HENT:   Head:    Normocephalic and atraumatic.   Mouth/Throat:   Oropharynx is clear and moist. No oropharyngeal exudate.   Eyes:    EOMI. No scleral icterus.   Neck:    No palpable thyroid tissue, no cervical LAD  Cardiovascular:  Normal rate, regular rhythm, no murmurs.  No carotid bruits.  Respiratory:   Effort normal and breath sounds normal.   Neurological:  No tremor, normal speech  Skin:    Skin is warm, dry  Psych:   Normal mood and affect.   Extremity:  No edema or wounds    LABORATORY REVIEW:  Thyroid Labs Latest Ref Rng & Units 10/21/2019   TSH 0.400 - 4.000 uIU/mL 0.321(L)   Free T4 0.71 - 1.51 ng/dL 1.38   Sodium 136 - 145 mmol/L 140   Potassium 3.5 - 5.1 mmol/L 4.4   Chloride 95 - 110 mmol/L 106   Carbon Dioxide 23 - 29 mmol/L 27   Glucose 70 - 110 mg/dL 83   Blood Urea Nitrogen 6 - 20 mg/dL 15   Creatinine 0.5 - 1.4 mg/dL 0.8   Calcium 8.7 - 10.5 mg/dL 9.5   Total Protein 6.0 - 8.4 g/dL 7.9   Albumin 3.5 - 5.2 g/dL 3.9   Total Bilirubin 0.1 - 1.0 mg/dL 0.3   AST 10 - 40 U/L 18   ALT 10 - 44 U/L 13   Anion Gap 8 - 16 mmol/L 7(L)   eGFR (African American) >60 mL/min/1.73 m:2 " >60.0   eGFR (Non-African American) >60 mL/min/1.73 m:2 >60.0   WBC 3.90 - 12.70 K/uL 6.12   RBC 4.00 - 5.40 M/uL 4.20   Hemoglobin 12.0 - 16.0 g/dL 12.3   Hematocrit 37.0 - 48.5 % 39.2   MCV 82 - 98 fL 93   MCH 27.0 - 31.0 pg 29.3   MCHC 32.0 - 36.0 g/dL 31.4(L)   RDW 11.5 - 14.5 % 13.8   Platelets 150 - 350 K/uL 303   MPV 9.2 - 12.9 fL 10.0   Gran # 1.8 - 7.7 K/uL 4.0   Lymph # 1.0 - 4.8 K/uL 1.4   Mono # 0.3 - 1.0 K/uL 0.6   Eos # 0.0 - 0.5 K/uL 0.1   Baso # 0.00 - 0.20 K/uL 0.02   Gran % 38.0 - 73.0 % 65.1   Lymph % 18.0 - 48.0 % 22.4   Mono% 4.0 - 15.0 % 10.0   Eos % 0.0 - 8.0 % 2.0   Baso % 0.0 - 1.9 % 0.3        IMAGING STUDIES  Neck US scheduled for 12/2020    ASSESSMENT/PLAN    Problem List Items Addressed This Visit        Oncology    History of thyroid cancer - Primary     Per hx sounds like subcentimeter encapsulated PTC w/o mets, unclear why she was treated with JOYA thus may have had more high risk features on path?    Will have her try to find path records and previous Tg/Ab labs.     Will check Tg and TgAb.  She is scheduled for surveillance neck US in 12/2020.             Relevant Orders    T4, free    TSH    Thyroglobulin       Endocrine    Postoperative hypothyroidism     Unclear why LT4 dose was increased from 100 to 200 mcg daily when she was stable on 100 mcg dose for many years.  Weight based dosing ~112 mcg daily.   Currently still taking 200 mcg daily with only mildly suppressed TSH and normal fT4.  Will decrease to LT4 150 mcg daily and recheck TSH in 6 wks.             Relevant Medications    levothyroxine (SYNTHROID) 150 MCG tablet    Other Relevant Orders    T4, free    TSH    Weight gain     We discussed that some of her symptoms may be related to over replacement.  But weight gain not consistent w/ over replacement.    Encourage continued healthy diet and increased physical activity.          Abnormal thyroid blood test          RTC 6 mo    Elmer Chau MD

## 2019-12-17 ENCOUNTER — TELEPHONE (OUTPATIENT)
Dept: RADIOLOGY | Facility: HOSPITAL | Age: 44
End: 2019-12-17

## 2019-12-18 ENCOUNTER — LAB VISIT (OUTPATIENT)
Dept: LAB | Facility: HOSPITAL | Age: 44
End: 2019-12-18
Attending: INTERNAL MEDICINE
Payer: COMMERCIAL

## 2019-12-18 ENCOUNTER — HOSPITAL ENCOUNTER (OUTPATIENT)
Dept: RADIOLOGY | Facility: HOSPITAL | Age: 44
Discharge: HOME | End: 2019-12-18
Attending: INTERNAL MEDICINE
Payer: COMMERCIAL

## 2019-12-18 DIAGNOSIS — Z85.850 HISTORY OF THYROID CANCER: ICD-10-CM

## 2019-12-18 LAB
ALBUMIN SERPL BCP-MCNC: 3.9 G/DL (ref 3.5–5.2)
ALP SERPL-CCNC: 70 U/L (ref 55–135)
ALT SERPL W/O P-5'-P-CCNC: 13 U/L (ref 10–44)
ANION GAP SERPL CALC-SCNC: 9 MMOL/L (ref 8–16)
AST SERPL-CCNC: 19 U/L (ref 10–40)
BASOPHILS # BLD AUTO: 0.01 K/UL (ref 0–0.2)
BASOPHILS NFR BLD: 0.2 % (ref 0–1.9)
BILIRUB SERPL-MCNC: 0.3 MG/DL (ref 0.1–1)
BUN SERPL-MCNC: 14 MG/DL (ref 6–20)
CALCIUM SERPL-MCNC: 8.9 MG/DL (ref 8.7–10.5)
CHLORIDE SERPL-SCNC: 104 MMOL/L (ref 95–110)
CO2 SERPL-SCNC: 26 MMOL/L (ref 23–29)
CREAT SERPL-MCNC: 0.7 MG/DL (ref 0.5–1.4)
DIFFERENTIAL METHOD: NORMAL
EOSINOPHIL # BLD AUTO: 0.1 K/UL (ref 0–0.5)
EOSINOPHIL NFR BLD: 2.2 % (ref 0–8)
ERYTHROCYTE [DISTWIDTH] IN BLOOD BY AUTOMATED COUNT: 13.2 % (ref 11.5–14.5)
EST. GFR  (AFRICAN AMERICAN): >60 ML/MIN/1.73 M^2
EST. GFR  (NON AFRICAN AMERICAN): >60 ML/MIN/1.73 M^2
GLUCOSE SERPL-MCNC: 74 MG/DL (ref 70–110)
HCT VFR BLD AUTO: 39.8 % (ref 37–48.5)
HGB BLD-MCNC: 13 G/DL (ref 12–16)
IMM GRANULOCYTES # BLD AUTO: 0.01 K/UL (ref 0–0.04)
IMM GRANULOCYTES NFR BLD AUTO: 0.2 % (ref 0–0.5)
LYMPHOCYTES # BLD AUTO: 1.2 K/UL (ref 1–4.8)
LYMPHOCYTES NFR BLD: 22.9 % (ref 18–48)
MCH RBC QN AUTO: 29.1 PG (ref 27–31)
MCHC RBC AUTO-ENTMCNC: 32.7 G/DL (ref 32–36)
MCV RBC AUTO: 89 FL (ref 82–98)
MONOCYTES # BLD AUTO: 0.6 K/UL (ref 0.3–1)
MONOCYTES NFR BLD: 11.1 % (ref 4–15)
NEUTROPHILS # BLD AUTO: 3.4 K/UL (ref 1.8–7.7)
NEUTROPHILS NFR BLD: 63.6 % (ref 38–73)
NRBC BLD-RTO: 0 /100 WBC
PLATELET # BLD AUTO: 313 K/UL (ref 150–350)
PMV BLD AUTO: 9.5 FL (ref 9.2–12.9)
POTASSIUM SERPL-SCNC: 3.6 MMOL/L (ref 3.5–5.1)
PROT SERPL-MCNC: 8.1 G/DL (ref 6–8.4)
RBC # BLD AUTO: 4.46 M/UL (ref 4–5.4)
SODIUM SERPL-SCNC: 139 MMOL/L (ref 136–145)
WBC # BLD AUTO: 5.41 K/UL (ref 3.9–12.7)

## 2019-12-18 PROCEDURE — 76536 US EXAM OF HEAD AND NECK: CPT | Mod: TC

## 2019-12-18 PROCEDURE — 80053 COMPREHEN METABOLIC PANEL: CPT

## 2019-12-18 PROCEDURE — 36415 COLL VENOUS BLD VENIPUNCTURE: CPT

## 2019-12-18 PROCEDURE — 76536 US EXAM OF HEAD AND NECK: CPT | Mod: 26,,, | Performed by: RADIOLOGY

## 2019-12-18 PROCEDURE — 85025 COMPLETE CBC W/AUTO DIFF WBC: CPT

## 2019-12-18 PROCEDURE — 76536 US SOFT TISSUE HEAD NECK THYROID: ICD-10-PCS | Mod: 26,,, | Performed by: RADIOLOGY

## 2019-12-26 RX ORDER — GABAPENTIN 300 MG/1
300 CAPSULE ORAL 3 TIMES DAILY
Qty: 90 CAPSULE | Refills: 0 | OUTPATIENT
Start: 2019-12-26

## 2019-12-30 ENCOUNTER — OFFICE VISIT (OUTPATIENT)
Dept: NEUROLOGY | Facility: CLINIC | Age: 44
End: 2019-12-30
Payer: COMMERCIAL

## 2019-12-30 VITALS
SYSTOLIC BLOOD PRESSURE: 120 MMHG | WEIGHT: 156.06 LBS | DIASTOLIC BLOOD PRESSURE: 82 MMHG | BODY MASS INDEX: 26.64 KG/M2 | HEART RATE: 84 BPM | HEIGHT: 64 IN

## 2019-12-30 DIAGNOSIS — Z85.850 HISTORY OF THYROID CANCER: ICD-10-CM

## 2019-12-30 DIAGNOSIS — G35 MS (MULTIPLE SCLEROSIS): Primary | ICD-10-CM

## 2019-12-30 PROCEDURE — 3008F PR BODY MASS INDEX (BMI) DOCUMENTED: ICD-10-PCS | Mod: CPTII,S$GLB,, | Performed by: PSYCHIATRY & NEUROLOGY

## 2019-12-30 PROCEDURE — 99999 PR PBB SHADOW E&M-EST. PATIENT-LVL III: CPT | Mod: PBBFAC,,, | Performed by: PSYCHIATRY & NEUROLOGY

## 2019-12-30 PROCEDURE — 99205 OFFICE O/P NEW HI 60 MIN: CPT | Mod: S$GLB,,, | Performed by: PSYCHIATRY & NEUROLOGY

## 2019-12-30 PROCEDURE — 99205 PR OFFICE/OUTPT VISIT, NEW, LEVL V, 60-74 MIN: ICD-10-PCS | Mod: S$GLB,,, | Performed by: PSYCHIATRY & NEUROLOGY

## 2019-12-30 PROCEDURE — 99999 PR PBB SHADOW E&M-EST. PATIENT-LVL III: ICD-10-PCS | Mod: PBBFAC,,, | Performed by: PSYCHIATRY & NEUROLOGY

## 2019-12-30 PROCEDURE — 3008F BODY MASS INDEX DOCD: CPT | Mod: CPTII,S$GLB,, | Performed by: PSYCHIATRY & NEUROLOGY

## 2019-12-30 RX ORDER — BACLOFEN 20 MG/1
20 TABLET ORAL DAILY PRN
Qty: 30 TABLET | Refills: 5 | Status: SHIPPED | OUTPATIENT
Start: 2019-12-30 | End: 2021-01-06

## 2019-12-30 RX ORDER — GABAPENTIN 300 MG/1
300 CAPSULE ORAL 3 TIMES DAILY
Qty: 90 CAPSULE | Refills: 2 | Status: SHIPPED | OUTPATIENT
Start: 2019-12-30

## 2019-12-30 NOTE — PROGRESS NOTES
Subjective:      Patient ID: Nisha Mancuso is a 44 y.o. female.    Chief Complaint:   I have multiple sclerosis and needed neurologist to follow me     The patient states that she has recently moved to [place] from [facility] but prior to that had lived in [place].  The patient states that in retrospect, she had her 1st attack of her MS at age 12 when she had an episode of diplopia and right-sided paralysis that lasted several months.  The diagnosis was not established at that time and the patient states that the symptoms after several months resolved and she was then asymptomatic until age 37.    At age 37, the patient states that her symptoms of diplopia returned.  However at that time she also was found to have thyroid cancer when she was pregnant with her last child.  After she went through the pregnancy and had a normal delivery, she noted development of right hand tremor and weakness of the right leg.  The patient states that at that time she saw neurologist in [place] who obtained MRI of the brain and made the diagnosis of multiple sclerosis based upon that MRI and her physical findings.  She was started on Betaseron in 2013. Since being placed on Betaseron, the patient states that she has not had any new symptoms or exacerbation of her illness.  She moved from [place] to [place] and saw a neurologist there who continued the Betaseron.  The patient now has moved to [place] as her daughter is in school and is planning to attend law school.    The patient states that she has occasional burning and tingling pain in the soles of both feet and occasionally in the hands as well.  The patient has also had intermittent left facial pain which is described as burning.  The patient had been given a prescription for gabapentin which she does take intermittently when the pain becomes uncomfortable for her.  She feels that the gabapentin relieves the discomfort very well without significant side effects.  The  "patient does not take the medication on a regular a consistent basis.    The patient denies to me any vision loss although she states that the diplopia has probably persisted although she ignores the double vision most of the time.  She is not aware of any vision loss in 1 eye or the other.  She is aware of a slight sensation of imbalance when walking indicating that her right side at times does not cooperate as much as it should.  This results in her walking "like a drunk person. "    The patient is not aware of any urinary incontinence or fecal incontinence.  She is not aware of any difficulty with articulation.  She is not aware of any significant pronounced fatigue symptoms.  The patient is of the opinion that her MS is well controlled with the Betaseron.  In review of her chart, she last had CBC and CMP done within the last 2 months.          ROS:  GENERAL: NO FEVER, CHILLS, FATIGABILITY OR WEIGHT LOSS.  SKIN: NO RASHES, ITCHING OR CHANGES IN COLOR OR TEXTURE OF SKIN.  HEAD: NO HEADACHES OR RECENT HEAD TRAUMA.  EYES: VISUAL ACUITY FINE. NO PHOTOPHOBIA, OCULAR PAIN   EARS: DENIES EAR PAIN, DISCHARGE OR VERTIGO.  NOSE: NO LOSS OF SMELL, NO EPISTAXIS OR POSTNASAL DRIP.  MOUTH & THROAT: NO HOARSENESS OR CHANGE IN VOICE. NO EXCESSIVE GUM BLEEDING.  NODES: DENIES SWOLLEN GLANDS.  CHEST: DENIES ARECHIGA, CYANOSIS, WHEEZING, COUGH AND SPUTUM PRODUCTION.  CARDIOVASCULAR: DENIES CHEST PAIN, PND, ORTHOPNEA OR REDUCED EXERCISE TOLERANCE.  ABDOMEN: APPETITE FINE. NO WEIGHT LOSS. DENIES DIARRHEA, ABDOMINAL PAIN, HEMATEMESIS OR BLOOD IN STOOL.  URINARY: NO FLANK PAIN, DYSURIA OR HEMATURIA.  PERIPHERAL VASCULAR: NO CLAUDICATION OR CYANOSIS.  MUSCULOSKELETAL: NO JOINT STIFFNESS OR SWELLING. DENIES BACK PAIN.  NEUROLOGIC: NO HISTORY OF SEIZURES,   OR UNEXPLAINED LOSS OF CONSCIOUSNESS.    Past Medical History:   Diagnosis Date    Multiple sclerosis     Thyroid cancer      Past Surgical History:   Procedure Laterality Date    " THYROIDECTOMY       Family History   Problem Relation Age of Onset    Arthritis Mother     Hypertension Father      There is no known family history of demyelinating disease in 1st order relatives.    Social History     Socioeconomic History    Marital status:      Spouse name: Not on file    Number of children: Not on file    Years of education: Not on file    Highest education level: Not on file   Occupational History    Not on file   Social Needs    Financial resource strain: Not on file    Food insecurity:     Worry: Not on file     Inability: Not on file    Transportation needs:     Medical: Not on file     Non-medical: Not on file   Tobacco Use    Smoking status: Never Smoker    Smokeless tobacco: Never Used   Substance and Sexual Activity    Alcohol use: Not Currently    Drug use: Never    Sexual activity: Yes   Lifestyle    Physical activity:     Days per week: Not on file     Minutes per session: Not on file    Stress: Not on file   Relationships    Social connections:     Talks on phone: Not on file     Gets together: Not on file     Attends Caodaism service: Not on file     Active member of club or organization: Not on file     Attends meetings of clubs or organizations: Not on file     Relationship status: Not on file   Other Topics Concern    Not on file   Social History Narrative    Not on file         Objective:   PE:   VITAL SIGNS:    HEIGHT 5 FT 4 IN, WEIGHT 70.8 KG, BMI 26.79  Vitals:    12/30/19 1526   BP: 120/82   Pulse: 84     APPEARANCE: WELL NOURISHED, WELL DEVELOPED, IN NO ACUTE DISTRESS.    HEAD: NORMOCEPHALIC, ATRAUMATIC.  EYES: PERRL.   NON-ICTERIC SCLERAE.    EARS: TM'S INTACT. LIGHT REFLEX NORMAL. NO RETRACTION OR PERFORATION.    NOSE: MUCOSA PINK. AIRWAY CLEAR.  MOUTH & THROAT: NO TONSILLAR ENLARGEMENT. NO PHARYNGEAL ERYTHEMA OR EXUDATE. NO STRIDOR.  NECK: SUPPLE. NO BRUITS.  CHEST: LUNGS CLEAR TO AUSCULTATION.  CARDIOVASCULAR: REGULAR RHYTHM WITHOUT  SIGNIFICANT MURMURS.  ABDOMEN: BOWEL SOUNDS NORMAL. NOT DISTENDED. SOFT. NO TENDERNESS OR MASSES.  MUSCULOSKELETAL:  NO BONY DEFORMITY SEEN.  MUSCLE TONE  AND MUSCLE MASS ARE NORMAL IN BOTH UPPER AND BOTH LOWER EXTREMITIES.    NEUROLOGIC:     Mental status: The patient is oriented to person, place, time.  The patient is able to recall 3 unrelated words at 3 minutes without difficulty.  The patient is able to focus on the exam and follow 3-step commands without difficulty.  The patient has intact language function including naming and syntax.  The patient shows good awareness of current events and has a good fund of general knowledge.  The patient is pleasant and cooperative for the examination.    Cranial nerves:  II: visual fields are intact by confrontation.  The patient is able to perceive the color red as the same intensity in each eye.  Funduscopic examination does not show any evidence of papilledema, hemorrhage, or exudate.  Visual acuity with hand held chart is    20/70 left eye, and 20 / 100 right eye unaided.  III, IV, VI: The extra ocular muscles are intact in the cardinal directions of gaze.  No ptosis is present.  Pupils are briskly reactive to light bilaterally.  V: Facial sensation is intact to light touch in all 3 divisions of the fifth cranial nerve.  Masseter function is equally strong.  Corneal reflexes are present and brisk.  VII: Facial features are symmetrical.  The patient has a symmetrical grimace, forced eyelid closure, and for head elevation.  VIII: Hearing is intact to voice and finger rub.  IX, X: The patient's uvula elevates in the midline.  Upon phonation, there is symmetrical elevation of the palate.  Gag reflex is intact bilaterally.  XI: The patient has a symmetrical shoulder shrug.  The sternocleidomastoid muscles are symmetrically strong.  No atrophy is present.  XII: The patient's tongue protrudes in the midline.  There is no atrophy present.  Articulation is clear without  dysarthria.    Gait and Station: Romberg is  Positive. The patient ambulates with a good alternate arm swing  But does have a tendency to drift to the right. The patient is able to heel walk and toe walk.    Motor:   There is a slight shoulder droop on the right. Manual muscle testing of both upper and lower extremities shows grade 5/5 strength bilaterally.  The patient's strength is normal for age and body habitus.  Examination of the extremities does not show any evidence of atrophy or other deformity.  Muscle tone is normal both upper and lower extremities.    Sensory: The patient has intact perception of pinprick, light touch, and vibration in both upper and lower extremities.  Position sense is intact in both upper and lower extremities.    Cerebellar: The patient is able to perform finger-to-nose and heel-to-shin without difficulty.  No involuntary movements are seen at rest.  Muscle tone is normal.  Rapid alternating movements are done without difficulty.  Coordination of alternating movements is normal.    Reflexes: Stretch reflexes including biceps, triceps, knees, and ankles is normal bilaterally.  Plantar stimulation is flexor bilaterally.  No pathological reflexes are seen.             Assessment:     Encounter Diagnoses   Name Primary?    MS (multiple sclerosis) Yes    History of thyroid cancer        Discussion:  The patient brought with her films from her MRIs that had been done while she was in [place] and these were reviewed which did show multiple lesions on the FLAIR images that are consistent with a diagnosis of multiple sclerosis.      Plan:     1.  Continue Betaseron every other day  2.  Routine follow-up with Neurology in 6 months.    This was a 65 min visit with the patient and her daughter with over 50% of the time spent counseling the patient and the daughter regarding the diagnosis of multiple sclerosis.  This note is generated with speech recognition software and is subject to  transcription error and sound alike phrases that may be missed by proofreading.

## 2020-01-06 DIAGNOSIS — E89.0 POSTOPERATIVE HYPOTHYROIDISM: ICD-10-CM

## 2020-01-08 RX ORDER — LEVOTHYROXINE SODIUM 150 UG/1
150 TABLET ORAL DAILY
Qty: 30 TABLET | Refills: 1 | Status: SHIPPED | OUTPATIENT
Start: 2020-01-08 | End: 2020-06-10

## 2020-01-24 DIAGNOSIS — Z12.39 BREAST CANCER SCREENING: ICD-10-CM

## 2020-02-17 ENCOUNTER — TELEPHONE (OUTPATIENT)
Dept: NEUROLOGY | Facility: CLINIC | Age: 45
End: 2020-02-17

## 2020-02-17 NOTE — TELEPHONE ENCOUNTER
----- Message from Mirian Tilley sent at 2/17/2020 10:53 AM CST -----  Contact: self  needs call back regarding medication form status..[phone]

## 2020-02-17 NOTE — TELEPHONE ENCOUNTER
Pt called in about form for [facility] she wants to know the status . Wants to know if the form was faxed because she is running  Out of medication .

## 2020-02-18 DIAGNOSIS — G35 MS (MULTIPLE SCLEROSIS): Primary | ICD-10-CM

## 2020-02-18 NOTE — TELEPHONE ENCOUNTER
pts prescription of betaseron went to her pharmacy [facility] it needs to go to our [facility] please advise/

## 2020-02-21 ENCOUNTER — TELEPHONE (OUTPATIENT)
Dept: PHARMACY | Facility: CLINIC | Age: 45
End: 2020-02-21

## 2020-02-21 NOTE — TELEPHONE ENCOUNTER
DOCUMENTATION ONLY:  Prior authorization for Betaseron not required.    Co-pay: $558.12    Patient Assistance is required

## 2020-02-21 NOTE — TELEPHONE ENCOUNTER
LVM for callback to inform patient that [facility] received prescription for Betaseron and benefits investigation is required.  [facility] will be back in touch once insurance determination is received.

## 2020-03-06 ENCOUNTER — TELEPHONE (OUTPATIENT)
Dept: PHARMACY | Facility: CLINIC | Age: 45
End: 2020-03-06

## 2020-03-06 NOTE — TELEPHONE ENCOUNTER
Initial Betaseron consult declined on Friday 3/6/20 - existing to thearpy. Betaseron 0.3mg kit will be shipped on Monday 3/9/20 to arrive at patient's home on Tuesday 3/10/20 via [employer]. $0.00 copay.  Patient is existing to treatment (7 years) and has not missed any doses - she reports having 4 doses on hand at this time. Verified that patient needs a sharps container - sending in shipment. Reviewed [facility] refill process and shipments. Advised patient to reach out to [facility] with any questions/concerns - none at this time. Address confirmed. Patient verbalized understanding. Confirmed 2 patient identifiers - name and . Therapy Appropriate.    Antonio Woodward, PharmD  Clinical Pharmacist  [facility]  P: [phone]

## 2020-03-17 NOTE — TELEPHONE ENCOUNTER
Call to complete touchbase with Betaseron. No answer, [facility],.     Walter Buckley, PharmD  Clinical Pharmacist  [facility]  P: [phone]

## 2020-04-03 ENCOUNTER — TELEPHONE (OUTPATIENT)
Dept: PHARMACY | Facility: CLINIC | Age: 45
End: 2020-04-03

## 2020-05-01 ENCOUNTER — TELEPHONE (OUTPATIENT)
Dept: PHARMACY | Facility: CLINIC | Age: 45
End: 2020-05-01

## 2020-05-04 ENCOUNTER — TELEPHONE (OUTPATIENT)
Dept: PHARMACY | Facility: CLINIC | Age: 45
End: 2020-05-04

## 2020-05-04 NOTE — TELEPHONE ENCOUNTER
Refill call regarding Betaseron at [facility]. Will prepare for shipment with patient consent on  to arrive . Copay 0.00. Patient has not started any new medications including OTC drugs. Patient has not had any medication/ dose or instruction changes. No new allergies or side effects reported with this shipment. Medication is being taken as prescribed by physician and properly stored. Two patient identifiers:  and Address verified. Patient has no questions or concerns for RPH. Patient has 2 injections on hand for this week with no sharps needed.

## 2020-05-28 ENCOUNTER — TELEPHONE (OUTPATIENT)
Dept: PHARMACY | Facility: CLINIC | Age: 45
End: 2020-05-28

## 2020-05-29 RX ORDER — MECLIZINE HYDROCHLORIDE 25 MG/1
TABLET ORAL
COMMUNITY
Start: 2020-05-14 | End: 2022-05-18

## 2020-05-29 NOTE — TELEPHONE ENCOUNTER
Refill and followup call for OZIEL mcdaniel. Patient confirmed need of the refill. Will deliver via [employer] on 6/3 to arrive on  with patient consent. Copay $0.00 at 004. Address confirmed. Patient has 3-4 doses remaining (6-8 day supply)/ next injection due . Patient denies missed doses and no side effects.  No new medicationsallergies/medical conditions. Labs are stable. No ER/Urgent care visits in past month. No Sharps container needed. Patient taking the medication as directed. Patient denies any further questions. Confirmed 2 patient identifiers - Name and .     Walter Buckley, PharmD  Clinical Pharmacist  [facility]  P: [phone]

## 2020-06-06 DIAGNOSIS — E89.0 POSTOPERATIVE HYPOTHYROIDISM: ICD-10-CM

## 2020-06-08 ENCOUNTER — LAB VISIT (OUTPATIENT)
Dept: LAB | Facility: HOSPITAL | Age: 45
End: 2020-06-08
Attending: INTERNAL MEDICINE
Payer: COMMERCIAL

## 2020-06-08 DIAGNOSIS — E89.0 POSTOPERATIVE HYPOTHYROIDISM: ICD-10-CM

## 2020-06-08 DIAGNOSIS — Z85.850 HISTORY OF THYROID CANCER: ICD-10-CM

## 2020-06-08 LAB
T4 FREE SERPL-MCNC: 1.56 NG/DL (ref 0.71–1.51)
TSH SERPL DL<=0.005 MIU/L-ACNC: 0.11 UIU/ML (ref 0.4–4)

## 2020-06-08 PROCEDURE — 84443 ASSAY THYROID STIM HORMONE: CPT

## 2020-06-08 PROCEDURE — 84439 ASSAY OF FREE THYROXINE: CPT

## 2020-06-08 PROCEDURE — 86800 THYROGLOBULIN ANTIBODY: CPT

## 2020-06-08 PROCEDURE — 36415 COLL VENOUS BLD VENIPUNCTURE: CPT

## 2020-06-08 RX ORDER — LEVOTHYROXINE SODIUM 150 UG/1
150 TABLET ORAL DAILY
Qty: 30 TABLET | Refills: 11 | Status: CANCELLED | OUTPATIENT
Start: 2020-06-08 | End: 2021-06-08

## 2020-06-08 NOTE — TELEPHONE ENCOUNTER
Per my last note, dose of levothyroxine was changed and she was supposed to have repeat labs in 6 weeks which she has not done.  Prior to refilling medication, she will need to get all of the blood work that I have ordered.  Please schedule an notify patient.    Elmer Chau MD

## 2020-06-10 LAB
THRYOGLOBULIN INTERPRETATION: ABNORMAL
THYROGLOB AB SERPL-ACNC: <1.8 IU/ML
THYROGLOB SERPL-MCNC: 0.1 NG/ML

## 2020-06-26 ENCOUNTER — TELEPHONE (OUTPATIENT)
Dept: PHARMACY | Facility: CLINIC | Age: 45
End: 2020-06-26

## 2020-06-26 NOTE — TELEPHONE ENCOUNTER
Refill call regarding Betaseron at [facility]. Will prepare for shipment with consent of patient on  to arrive . Copay 0.00. Patient has not started any new medications including OTC drugs. Patient has not had any medication/ dose or instruction changes. No new allergies or side effects reported with this shipment. Medication is being taken as prescribed by physician and properly stored. Two patient identifiers:  and Address verified. Patient has no questions or concerns for RPH. Patient has 3 injections on hand with no sharps needed.

## 2020-07-24 ENCOUNTER — TELEPHONE (OUTPATIENT)
Dept: PHARMACY | Facility: CLINIC | Age: 45
End: 2020-07-24

## 2020-08-06 ENCOUNTER — TELEPHONE (OUTPATIENT)
Dept: ENDOCRINOLOGY | Facility: CLINIC | Age: 45
End: 2020-08-06

## 2020-08-06 NOTE — TELEPHONE ENCOUNTER
Spoke with Ms. Mancuso and she has been switched over to [email or web address] for tomorrow for her visit.    ----- Message from Mathew Sroiano sent at 8/6/2020 10:24 AM CDT -----  Pt is requesting a call back, she wanted to make her appt to [email or web address] for tomorrow. When I tried to reschedule next available came up for 09/18. Pt was hoping to do this visit tomorrow. Please call back.      Contact Info [phone]

## 2020-08-07 ENCOUNTER — OFFICE VISIT (OUTPATIENT)
Dept: ENDOCRINOLOGY | Facility: CLINIC | Age: 45
End: 2020-08-07
Payer: COMMERCIAL

## 2020-08-07 DIAGNOSIS — E89.0 POSTOPERATIVE HYPOTHYROIDISM: Primary | ICD-10-CM

## 2020-08-07 DIAGNOSIS — Z85.850 HISTORY OF THYROID CANCER: ICD-10-CM

## 2020-08-07 DIAGNOSIS — K59.00 CONSTIPATION, UNSPECIFIED CONSTIPATION TYPE: ICD-10-CM

## 2020-08-07 PROCEDURE — 99214 OFFICE O/P EST MOD 30 MIN: CPT | Mod: 95,,, | Performed by: INTERNAL MEDICINE

## 2020-08-07 PROCEDURE — 99214 PR OFFICE/OUTPT VISIT, EST, LEVL IV, 30-39 MIN: ICD-10-PCS | Mod: 95,,, | Performed by: INTERNAL MEDICINE

## 2020-08-07 NOTE — ASSESSMENT & PLAN NOTE
Dose reduced to levothyroxine 125 mcg daily 6 weeks ago and she is now having weight gain and constipation.  Will recheck TSH and keep it in the lower half of the normal range.

## 2020-08-07 NOTE — ASSESSMENT & PLAN NOTE
Will check TSH, if in the lower half of normal, would not treat with higher doses of levothyroxine.  She is already taking fiber, I have advised that she can take MiraLax as needed and make sure that she is drinking plenty of water.

## 2020-08-07 NOTE — PROGRESS NOTES
ENDOCRINOLOGY FOLLOW UP  08/07/2020    CC: follow up for PTC and postsurgical hypothyroidism    She was seen on 11/22/19 Dr. Hernandez (endocrine) and would like to transfer care here.    HPI:  Ms. Pringle is a 44 y.o. female w/ hx of PTC s/p thyroidectomy in 2011 and JOYA in 2012 with postsurgical hypothyroidism and MS who is here to establish care.    Thyroid Hx:  Previously managed by endocrine in [place], moved to [facility] in 3/2019.      I have reviewed her outside records from [facility] and to summarize:  Thyroid cancer in 2011 diagnosed in [facility] during pregnancy when GYN found thyroid nodule.  Papillary cancer in one side, patient recalls it was encapsulated (pt believes it was a size of an eraser of a pencil)  Thyroidectomy in October 2011 (while 3 mo pregnant)  JOYA w/ 100 mCi in July 2012  Post JOYA uptake scan - uptake in thyroid bed, no other uptake  Was getting regular thyroglobulin levels which she reports as normal.    Interval events:  No new medical problems since her last visit.  She had labs done on 06/08/2020 showing suppressed TSH in mildly elevated free T4, thyroglobulin of 0.1 on 150 mcg of levothyroxine daily.  At that point she was instructed to reduce her dose to 125 mcg, repeat labs pending.  Since then she has had some weight gain and constipation    Risk Factors for Thyroid Cancer:  Hx of External Beam Radiation: no  Family Hx of Thyroid Cancer: none     Ref. Range 6/8/2020 15:20   TSH Latest Ref Range: 0.400 - 4.000 uIU/mL 0.112 (L)   Free T4 Latest Ref Range: 0.71 - 1.51 ng/dL 1.56 (H)   Thyroglobulin Interpretation Unknown SEE BELOW   Thyroglobulin Antibody Screen Latest Ref Range: <1.8 IU/mL <1.8   Thyroglobulin, Tumor Marker Latest Units: ng/mL 0.1 (H)       US neck 12/18/2019:  No evidence of residual thyroid tissue or cervical lymphadenopathy    ROS/ Thyroid Symptoms    Weight change:  [x]  Gain (up 7 lbs) []  Loss  []  Denies  []  fatigue       Temperature intolerance:  []   Cold  []  Hot   []  Denies     GI:  []  Diarrhea [x]  Constipation intermittent []  Denies   Taking fiber which helps    Integument:  [x]  Hair loss []  Dry skin  []  Denies    Other:  []  Palpitation []  tremor     []  Increased anxiety    [x]  Denies     Review of patient's allergies indicates:  No Known Allergies      Current Outpatient Medications:     baclofen (LIORESAL) 20 MG tablet, Take 1 tablet (20 mg total) by mouth daily as needed (muscle spasm)., Disp: 30 tablet, Rfl: 05    gabapentin (NEURONTIN) 300 MG capsule, Take 1 capsule (300 mg total) by mouth 3 (three) times daily., Disp: 90 capsule, Rfl: 2    interferon beta-1b (BETASERON) 0.3 mg Kit, Inject 0.25 mg into the skin every other day., Disp: 1 kit, Rfl: 11    levothyroxine (SYNTHROID) 125 MCG tablet, Take 1 tablet (125 mcg total) by mouth once daily., Disp: 90 tablet, Rfl: 0    meclizine (ANTIVERT) 25 mg tablet, , Disp: , Rfl:         PHYSICAL EXAM  There were no vitals taken for this visit.  Wt Readings from Last 3 Encounters:   12/30/19 70.8 kg (156 lb 1.4 oz)   11/29/19 69.3 kg (152 lb 12.5 oz)   11/18/19 71.6 kg (157 lb 13.6 oz)   ]  Constitutional:  Pleasant,  in no acute distress.   HENT:   Eyes:     No scleral icterus.   Respiratory:   Effort normal   Neurological:  normal speech  Psych:   Normal mood and affect.      LABORATORY REVIEW:  Thyroid Labs Latest Ref Rng & Units 10/21/2019 12/18/2019 6/8/2020   TSH 0.400 - 4.000 uIU/mL 0.321(L) - 0.112(L)   Free T4 0.71 - 1.51 ng/dL 1.38 - 1.56(H)   Sodium 136 - 145 mmol/L 140 139 -   Potassium 3.5 - 5.1 mmol/L 4.4 3.6 -   Chloride 95 - 110 mmol/L 106 104 -   Carbon Dioxide 23 - 29 mmol/L 27 26 -   Glucose 70 - 110 mg/dL 83 74 -   Blood Urea Nitrogen 6 - 20 mg/dL 15 14 -   Creatinine 0.5 - 1.4 mg/dL 0.8 0.7 -   Calcium 8.7 - 10.5 mg/dL 9.5 8.9 -   Total Protein 6.0 - 8.4 g/dL 7.9 8.1 -   Albumin 3.5 - 5.2 g/dL 3.9 3.9 -   Total Bilirubin 0.1 - 1.0 mg/dL 0.3 0.3 -   AST 10 - 40 U/L 18 19 -    ALT 10 - 44 U/L 13 13 -   Anion Gap 8 - 16 mmol/L 7(L) 9 -   eGFR (African American) >60 mL/min/1.73 m:2 >60.0 >60 -   eGFR (Non-African American) >60 mL/min/1.73 m:2 >60.0 >60 -   WBC 3.90 - 12.70 K/uL 6.12 5.41 -   RBC 4.00 - 5.40 M/uL 4.20 4.46 -   Hemoglobin 12.0 - 16.0 g/dL 12.3 13.0 -   Hematocrit 37.0 - 48.5 % 39.2 39.8 -   MCV 82 - 98 fL 93 89 -   MCH 27.0 - 31.0 pg 29.3 29.1 -   MCHC 32.0 - 36.0 g/dL 31.4(L) 32.7 -   RDW 11.5 - 14.5 % 13.8 13.2 -   Platelets 150 - 350 K/uL 303 313 -   MPV 9.2 - 12.9 fL 10.0 9.5 -   Gran # 1.8 - 7.7 K/uL 4.0 3.4 -   Lymph # 1.0 - 4.8 K/uL 1.4 1.2 -   Mono # 0.3 - 1.0 K/uL 0.6 0.6 -   Eos # 0.0 - 0.5 K/uL 0.1 0.1 -   Baso # 0.00 - 0.20 K/uL 0.02 0.01 -   Gran % 38.0 - 73.0 % 65.1 63.6 -   Lymph % 18.0 - 48.0 % 22.4 22.9 -   Mono% 4.0 - 15.0 % 10.0 11.1 -   Eos % 0.0 - 8.0 % 2.0 2.2 -   Baso % 0.0 - 1.9 % 0.3 0.2 -   Thyroglobulin, Tumor Marker ng/mL - - 0.1(H)   Thyroglobulin, Antibody Screen <1.8 IU/mL - - <1.8        IMAGING STUDIES  Neck US 12/2020- no disease    ASSESSMENT/PLAN    Problem List Items Addressed This Visit        1 - High    Postoperative hypothyroidism - Primary     Dose reduced to levothyroxine 125 mcg daily 6 weeks ago and she is now having weight gain and constipation.  Will recheck TSH and keep it in the lower half of the normal range.         Relevant Orders    TSH       2     History of thyroid cancer     Low thyroglobulin of 0.1 however TSH was suppressed in 06/2020.  Ultrasound without evidence of residual/recurrent disease in December 2019.  Will repeat thyroglobulin in TSH with in person visit in 1 year.  Will only plan to repeat ultrasound if there is biochemical evidence of recurrence.         Relevant Orders    TSH    Thyroglobulin       Unprioritized    Constipation     Will check TSH, if in the lower half of normal, would not treat with higher doses of levothyroxine.  She is already taking fiber, I have advised that she can take MiraLax as  needed and make sure that she is drinking plenty of water.               In person visit in 1 year w/ labs ordered today done prior    Elmer Chau MD

## 2020-08-07 NOTE — ASSESSMENT & PLAN NOTE
Low thyroglobulin of 0.1 however TSH was suppressed in 06/2020.  Ultrasound without evidence of residual/recurrent disease in December 2019.  Will repeat thyroglobulin in TSH with in person visit in 1 year.  Will only plan to repeat ultrasound if there is biochemical evidence of recurrence.

## 2020-08-07 NOTE — Clinical Note
Please schedule patient for thyroid labs now ordered in June.  Needs 1 year f/u with labs ordered today before her visit

## 2020-08-08 ENCOUNTER — LAB VISIT (OUTPATIENT)
Dept: LAB | Facility: HOSPITAL | Age: 45
End: 2020-08-08
Attending: INTERNAL MEDICINE
Payer: COMMERCIAL

## 2020-08-08 DIAGNOSIS — E89.0 POSTOPERATIVE HYPOTHYROIDISM: ICD-10-CM

## 2020-08-08 LAB
T4 FREE SERPL-MCNC: 1.23 NG/DL (ref 0.71–1.51)
TSH SERPL DL<=0.005 MIU/L-ACNC: 0.27 UIU/ML (ref 0.4–4)

## 2020-08-08 PROCEDURE — 84439 ASSAY OF FREE THYROXINE: CPT

## 2020-08-08 PROCEDURE — 84443 ASSAY THYROID STIM HORMONE: CPT

## 2020-08-08 PROCEDURE — 36415 COLL VENOUS BLD VENIPUNCTURE: CPT

## 2020-08-10 ENCOUNTER — TELEPHONE (OUTPATIENT)
Dept: ENDOCRINOLOGY | Facility: CLINIC | Age: 45
End: 2020-08-10

## 2020-08-10 ENCOUNTER — PATIENT MESSAGE (OUTPATIENT)
Dept: ENDOCRINOLOGY | Facility: HOSPITAL | Age: 45
End: 2020-08-10

## 2020-08-10 DIAGNOSIS — E89.0 POSTOPERATIVE HYPOTHYROIDISM: ICD-10-CM

## 2020-08-10 RX ORDER — LEVOTHYROXINE SODIUM 125 UG/1
TABLET ORAL
Qty: 90 TABLET | Refills: 0 | Status: SHIPPED | OUTPATIENT
Start: 2020-08-10 | End: 2020-10-12 | Stop reason: SDUPTHER

## 2020-08-10 NOTE — TELEPHONE ENCOUNTER
TSH remains suppressed, patient advised to skip levothyroxine 125 mcg on Sundays (take it 6 days a week).  Repeat TSH in 8 weeks    Lab Visit on 08/08/2020   Component Date Value Ref Range Status    Free T4 08/08/2020 1.23  0.71 - 1.51 ng/dL Final    TSH 08/08/2020 0.273* 0.400 - 4.000 uIU/mL Final       1. Postoperative hypothyroidism  - levothyroxine (SYNTHROID) 125 MCG tablet; Take 1 tablet by mouth 6 times a week.  Skip on Sundays  Dispense: 90 tablet; Refill: 0  - TSH; Future    Elmer Chau MD

## 2020-08-20 ENCOUNTER — TELEPHONE (OUTPATIENT)
Dept: PHARMACY | Facility: CLINIC | Age: 45
End: 2020-08-20

## 2020-08-20 NOTE — TELEPHONE ENCOUNTER
S/w pt regarding Betaseron re-assessment. Pt is at work and would like a call back tomorrow at 12 pm to complete follow-up and re-assessment. Calendar invite sent to Neuro team pharmacists.       Oswald Guerra, PharmD  [facility]

## 2020-08-21 ENCOUNTER — TELEPHONE (OUTPATIENT)
Dept: PHARMACY | Facility: CLINIC | Age: 45
End: 2020-08-21

## 2020-08-21 NOTE — TELEPHONE ENCOUNTER
Refill and follow-up call for Betaseron. Verified to ship on Tuesday 8/25 for delivery on Wednesday 8/26 via [employer]. Delivery address verified ([facility] address). $0.00 copay at 004.     Antonio Woodward, PharmD  Clinical Pharmacist  [facility]  P: [phone]

## 2020-08-21 NOTE — TELEPHONE ENCOUNTER
Call attempt 1- Betaseron refill and clinical f/u. No Answer. Left Voicemail.     Oswald Guerra, PharmD  [facility]

## 2020-09-17 ENCOUNTER — TELEPHONE (OUTPATIENT)
Dept: PHARMACY | Facility: CLINIC | Age: 45
End: 2020-09-17

## 2020-09-22 ENCOUNTER — TELEPHONE (OUTPATIENT)
Dept: PHARMACY | Facility: CLINIC | Age: 45
End: 2020-09-22

## 2020-10-06 ENCOUNTER — PATIENT MESSAGE (OUTPATIENT)
Dept: ADMINISTRATIVE | Facility: HOSPITAL | Age: 45
End: 2020-10-06

## 2020-10-10 ENCOUNTER — LAB VISIT (OUTPATIENT)
Dept: LAB | Facility: HOSPITAL | Age: 45
End: 2020-10-10
Attending: INTERNAL MEDICINE
Payer: COMMERCIAL

## 2020-10-10 DIAGNOSIS — E89.0 POSTOPERATIVE HYPOTHYROIDISM: ICD-10-CM

## 2020-10-10 LAB
T4 FREE SERPL-MCNC: 1.36 NG/DL (ref 0.71–1.51)
TSH SERPL DL<=0.005 MIU/L-ACNC: 0.1 UIU/ML (ref 0.4–4)

## 2020-10-10 PROCEDURE — 36415 COLL VENOUS BLD VENIPUNCTURE: CPT

## 2020-10-10 PROCEDURE — 84439 ASSAY OF FREE THYROXINE: CPT

## 2020-10-10 PROCEDURE — 84443 ASSAY THYROID STIM HORMONE: CPT

## 2020-10-11 ENCOUNTER — PATIENT MESSAGE (OUTPATIENT)
Dept: ENDOCRINOLOGY | Facility: CLINIC | Age: 45
End: 2020-10-11

## 2020-10-12 ENCOUNTER — PATIENT MESSAGE (OUTPATIENT)
Dept: ENDOCRINOLOGY | Facility: CLINIC | Age: 45
End: 2020-10-12

## 2020-10-12 DIAGNOSIS — E89.0 POSTOPERATIVE HYPOTHYROIDISM: ICD-10-CM

## 2020-10-12 RX ORDER — LEVOTHYROXINE SODIUM 100 UG/1
TABLET ORAL
Qty: 30 TABLET | Refills: 3 | Status: SHIPPED | OUTPATIENT
Start: 2020-10-12 | End: 2020-11-12 | Stop reason: SDUPTHER

## 2020-10-12 NOTE — TELEPHONE ENCOUNTER
1. Postoperative hypothyroidism  Reduce from 125 mcg 6 days/wk  - levothyroxine (SYNTHROID) 100 MCG tablet; Take 1 tablet by mouth daily  Dispense: 30 tablet; Refill: 3  - TSH; Future - in 2 mo    Lab Results   Component Value Date    TSH 0.103 (L) 10/10/2020    FREET4 1.36 10/10/2020         Elmer Chua MD

## 2020-10-18 PROBLEM — G35 MS (MULTIPLE SCLEROSIS): Chronic | Status: ACTIVE | Noted: 2019-10-18

## 2020-10-28 ENCOUNTER — SPECIALTY PHARMACY (OUTPATIENT)
Dept: PHARMACY | Facility: CLINIC | Age: 45
End: 2020-10-28

## 2020-10-28 DIAGNOSIS — G35 MS (MULTIPLE SCLEROSIS): Primary | ICD-10-CM
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
## 2020-11-07 ENCOUNTER — PATIENT MESSAGE (OUTPATIENT)
Dept: ENDOCRINOLOGY | Facility: CLINIC | Age: 45
End: 2020-11-07

## 2020-11-07 DIAGNOSIS — E89.0 POSTOPERATIVE HYPOTHYROIDISM: ICD-10-CM

## 2020-11-12 RX ORDER — LEVOTHYROXINE SODIUM 100 UG/1
TABLET ORAL
Qty: 30 TABLET | Refills: 3 | Status: SHIPPED | OUTPATIENT
Start: 2020-11-12 | End: 2020-12-14 | Stop reason: SDUPTHER

## 2020-11-20 ENCOUNTER — SPECIALTY PHARMACY (OUTPATIENT)
Dept: PHARMACY | Facility: CLINIC | Age: 45
End: 2020-11-20

## 2020-11-20 DIAGNOSIS — G35 MS (MULTIPLE SCLEROSIS): Primary | ICD-10-CM

## 2020-11-20 NOTE — TELEPHONE ENCOUNTER
Specialty Pharmacy - Clinical Reassessment  Specialty Pharmacy - Refill Coordination    Specialty Medication Orders Linked to Encounter      Most Recent Value   Medication #1  interferon beta-1b (BETASERON) 0.3 mg Kit (Order#[id number], Rx#[id number])          Refill Questions - Documented Responses      Most Recent Value   Relationship to patient of person spoken to?  Self   HIPAA/medical authority confirmed?  Yes   Any changes in contact preferences or allowed representatives?  No   Has the patient had any insurance changes?  No   Has the patient had any changes to specialty medication, dose, or instructions?  No   Has the patient started taking any new medications, herbals, or supplements?  No   Has the patient been diagnosed with any new medical conditions?  No   Does the patient have any new allergies to medications or foods?  No   Does the patient have any concerns about side effects?  No   Can the patient store medication/sharps container properly (at the correct temperature, away from children/pets, etc.)?  Yes   Can the patient call emergency services (911) in the event of an emergency?  Yes   Does the patient have any concerns or questions about taking or administering this medication as prescribed?  No   How many doses did the patient miss in the past 4 weeks or since the last fill?  0   How many doses does the patient have on hand?  4   How many days does the patient report on hand quantity will last?  8   Does the number of doses/days supply remaining match pharmacy expected amounts?  Yes   Does the patient feel that this medication is effective?  Yes   During the past 4 weeks, has patient missed any activities due to condition or medication?  No   During the past 4 weeks, did patient have any of the following urgent care visits?  None   How will the patient receive the medication?  Mail   When does the patient need to receive the medication?  11/28/20   Shipping Address  Home   Address in [place]  [facility] confirmed and updated if neccessary?  Yes   Expected Copay ($)  0   Is the patient able to afford the medication copay?  Yes   Payment Method  zero copay   Days supply of Refill  28   Would patient like to speak to a pharmacist?  No   Do you want to trigger an intervention?  No   Do you want to trigger an additional referral task?  No   Refill activity completed?  Yes   Refill activity plan  Refill scheduled   Shipment/Pickup Date:  11/24/20          Current Outpatient Medications   Medication Sig    baclofen (LIORESAL) 20 MG tablet Take 1 tablet (20 mg total) by mouth daily as needed (muscle spasm).    gabapentin (NEURONTIN) 300 MG capsule Take 1 capsule (300 mg total) by mouth 3 (three) times daily.    interferon beta-1b (BETASERON) 0.3 mg Kit Inject 0.25 mg into the skin every other day.    levothyroxine (SYNTHROID) 100 MCG tablet Take 1 tablet by mouth daily    meclizine (ANTIVERT) 25 mg tablet    Last reviewed on 10/28/2020 11:41 AM by Ling Álvarez PharmD    Review of patient's allergies indicates:  No Known Allergies Last reviewed on  11/12/2020 4:12 PM by Eli Osuna    Patient does not request sharps container and request for follow-up to be completed at next refill on 12/18. Request for medication to be shipped to address on [address].    Tasks added this encounter   12/18/2020 - Refill Call (Auto Added)   Tasks due within next 3 months   12/18/2020 - Clinical - Follow Up Assesement (90 day)     Oswald Guerra PharmD  [facility]  [address] [place] [id number]  Phone: [phone]  Fax: [phone]

## 2020-12-11 ENCOUNTER — LAB VISIT (OUTPATIENT)
Dept: LAB | Facility: HOSPITAL | Age: 45
End: 2020-12-11
Attending: INTERNAL MEDICINE
Payer: COMMERCIAL

## 2020-12-11 DIAGNOSIS — E89.0 POSTOPERATIVE HYPOTHYROIDISM: ICD-10-CM

## 2020-12-11 DIAGNOSIS — Z85.850 HISTORY OF THYROID CANCER: ICD-10-CM

## 2020-12-11 PROCEDURE — 84443 ASSAY THYROID STIM HORMONE: CPT

## 2020-12-11 PROCEDURE — 86800 THYROGLOBULIN ANTIBODY: CPT

## 2020-12-11 PROCEDURE — 84439 ASSAY OF FREE THYROXINE: CPT

## 2020-12-11 PROCEDURE — 36415 COLL VENOUS BLD VENIPUNCTURE: CPT

## 2020-12-12 LAB
T4 FREE SERPL-MCNC: 0.89 NG/DL (ref 0.71–1.51)
TSH SERPL DL<=0.005 MIU/L-ACNC: 9.44 UIU/ML (ref 0.4–4)

## 2020-12-13 ENCOUNTER — PATIENT MESSAGE (OUTPATIENT)
Dept: ENDOCRINOLOGY | Facility: CLINIC | Age: 45
End: 2020-12-13

## 2020-12-14 ENCOUNTER — PATIENT MESSAGE (OUTPATIENT)
Dept: ENDOCRINOLOGY | Facility: CLINIC | Age: 45
End: 2020-12-14

## 2020-12-14 DIAGNOSIS — E89.0 POSTOPERATIVE HYPOTHYROIDISM: ICD-10-CM

## 2020-12-14 RX ORDER — LEVOTHYROXINE SODIUM 112 UG/1
TABLET ORAL
Qty: 30 TABLET | Refills: 3 | Status: SHIPPED | OUTPATIENT
Start: 2020-12-14 | End: 2022-05-18

## 2020-12-14 NOTE — TELEPHONE ENCOUNTER
TSH elevated on 100 mcg of levothyroxine daily however in the past was suppressed when taking 125 mcg 6 days a week (equivalent to 107 mcg daily).  Will change to 112 mcg daily and use only Synthroid brand to minimize dosing variability.      Repeat TSH in 2 months    1. Postoperative hypothyroidism  - levothyroxine (SYNTHROID) 112 MCG tablet; Take 1 tablet by mouth daily  Dispense: 30 tablet; Refill: 3  - TSH; Future    Elmer Chau MD

## 2020-12-18 ENCOUNTER — PATIENT MESSAGE (OUTPATIENT)
Dept: PHARMACY | Facility: CLINIC | Age: 45
End: 2020-12-18

## 2020-12-23 ENCOUNTER — SPECIALTY PHARMACY (OUTPATIENT)
Dept: PHARMACY | Facility: CLINIC | Age: 45
End: 2020-12-23

## 2020-12-23 NOTE — TELEPHONE ENCOUNTER
12/23 WWB   refill attempt for betaseron, pt was unable to talk at this time. Will follow up later today.

## 2020-12-29 ENCOUNTER — SPECIALTY PHARMACY (OUTPATIENT)
Dept: PHARMACY | Facility: CLINIC | Age: 45
End: 2020-12-29

## 2020-12-29 NOTE — TELEPHONE ENCOUNTER
Specialty Pharmacy - Refill Coordination    Specialty Medication Orders Linked to Encounter      Most Recent Value   Medication #1  interferon beta-1b (BETASERON) 0.3 mg Kit (Order#[id number], Rx#[id number])          Refill Questions - Documented Responses      Most Recent Value   Relationship to patient of person spoken to?  Self   HIPAA/medical authority confirmed?  Yes   Any changes in contact preferences or allowed representatives?  No   Has the patient had any insurance changes?  No   Has the patient had any changes to specialty medication, dose, or instructions?  No   Has the patient started taking any new medications, herbals, or supplements?  No   Has the patient been diagnosed with any new medical conditions?  No   Does the patient have any new allergies to medications or foods?  No   Does the patient have any concerns about side effects?  No   Can the patient store medication/sharps container properly (at the correct temperature, away from children/pets, etc.)?  Yes   Can the patient call emergency services (911) in the event of an emergency?  Yes   Does the patient have any concerns or questions about taking or administering this medication as prescribed?  No   How many doses did the patient miss in the past 4 weeks or since the last fill?  0   How many doses does the patient have on hand?  4   How many days does the patient report on hand quantity will last?  4   Does the number of doses/days supply remaining match pharmacy expected amounts?  Yes   Does the patient feel that this medication is effective?  Yes   During the past 4 weeks, has patient missed any activities due to condition or medication?  No   During the past 4 weeks, did patient have any of the following urgent care visits?  None   How will the patient receive the medication?  Mail   When does the patient need to receive the medication?  01/06/21   Shipping Address  Home   Address in [facility] confirmed and updated if neccessary?   Yes   Expected Copay ($)  0   Is the patient able to afford the medication copay?  Yes   Payment Method  zero copay   Days supply of Refill  28   Would patient like to speak to a pharmacist?  No   Do you want to trigger an intervention?  No   Do you want to trigger an additional referral task?  No   Refill activity completed?  Yes   Refill activity plan  Refill scheduled   Shipment/Pickup Date:  01/04/21          Current Outpatient Medications   Medication Sig    baclofen (LIORESAL) 20 MG tablet Take 1 tablet (20 mg total) by mouth daily as needed (muscle spasm).    gabapentin (NEURONTIN) 300 MG capsule Take 1 capsule (300 mg total) by mouth 3 (three) times daily.    interferon beta-1b (BETASERON) 0.3 mg Kit Inject 0.25 mg into the skin every other day.    levothyroxine (SYNTHROID) 112 MCG tablet Take 1 tablet by mouth daily    meclizine (ANTIVERT) 25 mg tablet    Last reviewed on 10/28/2020 11:41 AM by Ling Álvarez PharmD    Review of patient's allergies indicates:  No Known Allergies Last reviewed on  12/14/2020 1:42 PM by Elmer Chau      Tasks added this encounter   1/26/2021 - Refill Call (Auto Added)   Tasks due within next 3 months   No tasks due.     [facility]  [address] [place] [id number]  Phone: [phone]  Fax: [phone]

## 2021-01-26 ENCOUNTER — SPECIALTY PHARMACY (OUTPATIENT)
Dept: PHARMACY | Facility: CLINIC | Age: 46
End: 2021-01-26

## 2021-02-19 ENCOUNTER — SPECIALTY PHARMACY (OUTPATIENT)
Dept: PHARMACY | Facility: CLINIC | Age: 46
End: 2021-02-19

## 2021-02-23 ENCOUNTER — PATIENT MESSAGE (OUTPATIENT)
Dept: PHARMACY | Facility: CLINIC | Age: 46
End: 2021-02-23

## 2021-03-01 ENCOUNTER — SPECIALTY PHARMACY (OUTPATIENT)
Dept: PHARMACY | Facility: CLINIC | Age: 46
End: 2021-03-01

## 2021-03-24 ENCOUNTER — SPECIALTY PHARMACY (OUTPATIENT)
Dept: PHARMACY | Facility: CLINIC | Age: 46
End: 2021-03-24

## 2021-03-25 ENCOUNTER — PATIENT MESSAGE (OUTPATIENT)
Dept: ADMINISTRATIVE | Facility: HOSPITAL | Age: 46
End: 2021-03-25

## 2021-04-09 ENCOUNTER — PATIENT MESSAGE (OUTPATIENT)
Dept: PHARMACY | Facility: CLINIC | Age: 46
End: 2021-04-09

## 2021-05-05 ENCOUNTER — SPECIALTY PHARMACY (OUTPATIENT)
Dept: PHARMACY | Facility: CLINIC | Age: 46
End: 2021-05-05

## 2021-06-02 ENCOUNTER — SPECIALTY PHARMACY (OUTPATIENT)
Dept: PHARMACY | Facility: CLINIC | Age: 46
End: 2021-06-02

## 2021-06-30 ENCOUNTER — SPECIALTY PHARMACY (OUTPATIENT)
Dept: PHARMACY | Facility: CLINIC | Age: 46
End: 2021-06-30

## 2021-08-02 ENCOUNTER — SPECIALTY PHARMACY (OUTPATIENT)
Dept: PHARMACY | Facility: CLINIC | Age: 46
End: 2021-08-02

## 2021-08-25 ENCOUNTER — PATIENT MESSAGE (OUTPATIENT)
Dept: PHARMACY | Facility: CLINIC | Age: 46
End: 2021-08-25

## 2021-09-06 ENCOUNTER — PATIENT MESSAGE (OUTPATIENT)
Dept: PHARMACY | Facility: CLINIC | Age: 46
End: 2021-09-06

## 2021-09-13 ENCOUNTER — SPECIALTY PHARMACY (OUTPATIENT)
Dept: PHARMACY | Facility: CLINIC | Age: 46
End: 2021-09-13

## 2021-12-21 ENCOUNTER — PATIENT MESSAGE (OUTPATIENT)
Dept: NEUROLOGY | Facility: CLINIC | Age: 46
End: 2021-12-21
Payer: COMMERCIAL

## 2022-02-28 ENCOUNTER — PATIENT MESSAGE (OUTPATIENT)
Dept: PSYCHIATRY | Facility: CLINIC | Age: 47
End: 2022-02-28
Payer: COMMERCIAL

## 2022-04-27 ENCOUNTER — PATIENT MESSAGE (OUTPATIENT)
Dept: ADMINISTRATIVE | Facility: HOSPITAL | Age: 47
End: 2022-04-27
Payer: COMMERCIAL

## 2022-05-18 ENCOUNTER — LAB VISIT (OUTPATIENT)
Dept: LAB | Facility: HOSPITAL | Age: 47
End: 2022-05-18
Attending: NURSE PRACTITIONER
Payer: COMMERCIAL

## 2022-05-18 ENCOUNTER — OFFICE VISIT (OUTPATIENT)
Dept: OBSTETRICS AND GYNECOLOGY | Facility: CLINIC | Age: 47
End: 2022-05-18
Payer: COMMERCIAL

## 2022-05-18 VITALS
SYSTOLIC BLOOD PRESSURE: 162 MMHG | BODY MASS INDEX: 27.96 KG/M2 | WEIGHT: 163.81 LBS | DIASTOLIC BLOOD PRESSURE: 102 MMHG | HEIGHT: 64 IN

## 2022-05-18 DIAGNOSIS — N93.8 DUB (DYSFUNCTIONAL UTERINE BLEEDING): ICD-10-CM

## 2022-05-18 DIAGNOSIS — N93.8 DUB (DYSFUNCTIONAL UTERINE BLEEDING): Primary | ICD-10-CM

## 2022-05-18 LAB
BASOPHILS # BLD AUTO: 0.03 K/UL (ref 0–0.2)
BASOPHILS NFR BLD: 0.4 % (ref 0–1.9)
DIFFERENTIAL METHOD: ABNORMAL
EOSINOPHIL # BLD AUTO: 0.2 K/UL (ref 0–0.5)
EOSINOPHIL NFR BLD: 2.8 % (ref 0–8)
ERYTHROCYTE [DISTWIDTH] IN BLOOD BY AUTOMATED COUNT: 14.6 % (ref 11.5–14.5)
HCT VFR BLD AUTO: 34.7 % (ref 37–48.5)
HGB BLD-MCNC: 11 G/DL (ref 12–16)
IMM GRANULOCYTES # BLD AUTO: 0.02 K/UL (ref 0–0.04)
IMM GRANULOCYTES NFR BLD AUTO: 0.3 % (ref 0–0.5)
LYMPHOCYTES # BLD AUTO: 1.8 K/UL (ref 1–4.8)
LYMPHOCYTES NFR BLD: 24.1 % (ref 18–48)
MCH RBC QN AUTO: 29 PG (ref 27–31)
MCHC RBC AUTO-ENTMCNC: 31.7 G/DL (ref 32–36)
MCV RBC AUTO: 92 FL (ref 82–98)
MONOCYTES # BLD AUTO: 0.6 K/UL (ref 0.3–1)
MONOCYTES NFR BLD: 8.4 % (ref 4–15)
NEUTROPHILS # BLD AUTO: 4.7 K/UL (ref 1.8–7.7)
NEUTROPHILS NFR BLD: 64 % (ref 38–73)
NRBC BLD-RTO: 0 /100 WBC
PLATELET # BLD AUTO: 418 K/UL (ref 150–450)
PMV BLD AUTO: 10.3 FL (ref 9.2–12.9)
RBC # BLD AUTO: 3.79 M/UL (ref 4–5.4)
TSH SERPL DL<=0.005 MIU/L-ACNC: 1.8 UIU/ML (ref 0.4–4)
WBC # BLD AUTO: 7.27 K/UL (ref 3.9–12.7)

## 2022-05-18 PROCEDURE — 88305 TISSUE EXAM BY PATHOLOGIST: CPT | Performed by: PATHOLOGY

## 2022-05-18 PROCEDURE — 85025 COMPLETE CBC W/AUTO DIFF WBC: CPT | Performed by: NURSE PRACTITIONER

## 2022-05-18 PROCEDURE — 3080F DIAST BP >= 90 MM HG: CPT | Mod: CPTII,S$GLB,, | Performed by: NURSE PRACTITIONER

## 2022-05-18 PROCEDURE — 99214 OFFICE O/P EST MOD 30 MIN: CPT | Mod: S$GLB,,, | Performed by: NURSE PRACTITIONER

## 2022-05-18 PROCEDURE — 88305 TISSUE EXAM BY PATHOLOGIST: ICD-10-PCS | Mod: 26,,, | Performed by: PATHOLOGY

## 2022-05-18 PROCEDURE — 99999 PR PBB SHADOW E&M-EST. PATIENT-LVL III: ICD-10-PCS | Mod: PBBFAC,,, | Performed by: NURSE PRACTITIONER

## 2022-05-18 PROCEDURE — 36415 COLL VENOUS BLD VENIPUNCTURE: CPT | Performed by: NURSE PRACTITIONER

## 2022-05-18 PROCEDURE — 88175 CYTOPATH C/V AUTO FLUID REDO: CPT | Performed by: NURSE PRACTITIONER

## 2022-05-18 PROCEDURE — 3008F BODY MASS INDEX DOCD: CPT | Mod: CPTII,S$GLB,, | Performed by: NURSE PRACTITIONER

## 2022-05-18 PROCEDURE — 3008F PR BODY MASS INDEX (BMI) DOCUMENTED: ICD-10-PCS | Mod: CPTII,S$GLB,, | Performed by: NURSE PRACTITIONER

## 2022-05-18 PROCEDURE — 87624 HPV HI-RISK TYP POOLED RSLT: CPT | Performed by: NURSE PRACTITIONER

## 2022-05-18 PROCEDURE — 3077F SYST BP >= 140 MM HG: CPT | Mod: CPTII,S$GLB,, | Performed by: NURSE PRACTITIONER

## 2022-05-18 PROCEDURE — 99999 PR PBB SHADOW E&M-EST. PATIENT-LVL III: CPT | Mod: PBBFAC,,, | Performed by: NURSE PRACTITIONER

## 2022-05-18 PROCEDURE — 88305 TISSUE EXAM BY PATHOLOGIST: CPT | Mod: 26,,, | Performed by: PATHOLOGY

## 2022-05-18 PROCEDURE — 3077F PR MOST RECENT SYSTOLIC BLOOD PRESSURE >= 140 MM HG: ICD-10-PCS | Mod: CPTII,S$GLB,, | Performed by: NURSE PRACTITIONER

## 2022-05-18 PROCEDURE — 1159F MED LIST DOCD IN RCRD: CPT | Mod: CPTII,S$GLB,, | Performed by: NURSE PRACTITIONER

## 2022-05-18 PROCEDURE — 99214 PR OFFICE/OUTPT VISIT, EST, LEVL IV, 30-39 MIN: ICD-10-PCS | Mod: S$GLB,,, | Performed by: NURSE PRACTITIONER

## 2022-05-18 PROCEDURE — 84443 ASSAY THYROID STIM HORMONE: CPT | Performed by: NURSE PRACTITIONER

## 2022-05-18 PROCEDURE — 1159F PR MEDICATION LIST DOCUMENTED IN MEDICAL RECORD: ICD-10-PCS | Mod: CPTII,S$GLB,, | Performed by: NURSE PRACTITIONER

## 2022-05-18 PROCEDURE — 3080F PR MOST RECENT DIASTOLIC BLOOD PRESSURE >= 90 MM HG: ICD-10-PCS | Mod: CPTII,S$GLB,, | Performed by: NURSE PRACTITIONER

## 2022-05-18 RX ORDER — TERIFLUNOMIDE 14 MG/1
1 TABLET, FILM COATED ORAL DAILY
COMMUNITY
Start: 2022-05-16

## 2022-05-18 RX ORDER — MEDROXYPROGESTERONE ACETATE 10 MG/1
10 TABLET ORAL DAILY
Qty: 10 TABLET | Refills: 0 | Status: SHIPPED | OUTPATIENT
Start: 2022-05-18 | End: 2022-06-02 | Stop reason: SDUPTHER

## 2022-05-18 RX ORDER — CARBAMAZEPINE 200 MG/1
200 TABLET ORAL DAILY PRN
COMMUNITY
Start: 2022-05-13

## 2022-05-18 RX ORDER — LEVOTHYROXINE SODIUM 125 UG/1
125 TABLET ORAL DAILY
COMMUNITY
Start: 2022-05-12 | End: 2022-08-24

## 2022-05-18 NOTE — PROGRESS NOTES
No chief complaint on file.       Nisha Mancuso is a 46 y.o. female    She has been bleeding off and on since 2021, however she has now been bleeding for 4 weeks now. Her period will stop but she is always bleeding. She thought it was a side effect of some of her medication she was taking. However she had an appt with her neurologist last week and she said it was not he medication.    She has a hoistiry of thyroid cancer and has had a thyroidectomy.    After urination this morning,  while she was wiping herself she felt something between her legs it felt like her uterus was adia.     Last pap smear   Past Medical History:   Diagnosis Date    Multiple sclerosis     Thyroid cancer     Thyroid cancer      Past Surgical History:   Procedure Laterality Date    THYROIDECTOMY       Social History     Tobacco Use    Smoking status: Never Smoker    Smokeless tobacco: Never Used   Substance Use Topics    Alcohol use: Not Currently    Drug use: Never     Family History   Problem Relation Age of Onset    Arthritis Mother     Hypertension Father     Breast cancer Neg Hx     Cancer Neg Hx     Colon cancer Neg Hx     Ovarian cancer Neg Hx      OB History    Para Term  AB Living   4       1 3   SAB IAB Ectopic Multiple Live Births   1              # Outcome Date GA Lbr Parminder/2nd Weight Sex Delivery Anes PTL Lv   4             3             2             1 SAB                Medication List with Changes/Refills   New Medications    MEDROXYPROGESTERONE (PROVERA) 10 MG TABLET    Take 1 tablet (10 mg total) by mouth once daily. for 10 days   Current Medications    AUBAGIO 14 MG TAB    Take 1 tablet by mouth once daily.    BACLOFEN (LIORESAL) 20 MG TABLET    TAKE ONE TABLET BY MOUTH EVERY DAY    CARBAMAZEPINE (TEGRETOL) 200 MG TABLET    Take 200 mg by mouth daily as needed.    GABAPENTIN (NEURONTIN) 300 MG CAPSULE    Take 1 capsule (300 mg total) by mouth 3  "(three) times daily.    INTERFERON BETA-1B (BETASERON) 0.3 MG KIT    Inject 0.25 mg into the skin every other day.    LEVOTHYROXINE (SYNTHROID) 125 MCG TABLET    Take 125 mcg by mouth once daily.   Discontinued Medications    INTERFERON BETA-1B (BETASERON) 0.3 MG KIT    Inject 0.25 mg into the skin every other day.    INTERFERON BETA-1B (BETASERON) 0.3 MG KIT    Inject 0.25 mg into the skin every other day.    LEVOTHYROXINE (SYNTHROID) 112 MCG TABLET    Take 1 tablet by mouth daily    MECLIZINE (ANTIVERT) 25 MG TABLET          BP (!) 162/102   Ht 5' 4" (1.626 m)   Wt 74.3 kg (163 lb 12.8 oz)   LMP 05/12/2022   BMI 28.12 kg/m²     ROS:  Review of Systems      PHYSICAL EXAM:  Physical Exam  Genitourinary:      Vaginal bleeding present.      Cervical exam comments: Possible fibroid seen in cervix.      Uterus is prolapsed.             ASSESSMENT and PLAN:    ICD-10-CM ICD-9-CM    1. DUB (dysfunctional uterine bleeding)  N93.8 626.8 Liquid-Based Pap Smear, Screening      HPV High Risk Genotypes, PCR      CBC Auto Differential      TSH      US Pelvis Complete Non OB      medroxyPROGESTERone (PROVERA) 10 MG tablet   Denies history of hyperension. Recommend see PCP for further evaluation     Silvia Oconnell NP     "

## 2022-05-20 ENCOUNTER — HOSPITAL ENCOUNTER (OUTPATIENT)
Dept: RADIOLOGY | Facility: HOSPITAL | Age: 47
Discharge: HOME | End: 2022-05-20
Attending: NURSE PRACTITIONER
Payer: COMMERCIAL

## 2022-05-20 DIAGNOSIS — N93.8 DUB (DYSFUNCTIONAL UTERINE BLEEDING): ICD-10-CM

## 2022-05-20 PROCEDURE — 76856 US EXAM PELVIC COMPLETE: CPT | Mod: 26,,, | Performed by: RADIOLOGY

## 2022-05-20 PROCEDURE — 76830 US PELVIS COMP WITH TRANSVAG NON-OB (XPD): ICD-10-PCS | Mod: 26,,, | Performed by: RADIOLOGY

## 2022-05-20 PROCEDURE — 76830 TRANSVAGINAL US NON-OB: CPT | Mod: TC

## 2022-05-20 PROCEDURE — 76830 TRANSVAGINAL US NON-OB: CPT | Mod: 26,,, | Performed by: RADIOLOGY

## 2022-05-20 PROCEDURE — 76856 US PELVIS COMP WITH TRANSVAG NON-OB (XPD): ICD-10-PCS | Mod: 26,,, | Performed by: RADIOLOGY

## 2022-05-23 LAB
HPV HR 12 DNA SPEC QL NAA+PROBE: NEGATIVE
HPV16 AG SPEC QL: NEGATIVE
HPV18 DNA SPEC QL NAA+PROBE: NEGATIVE

## 2022-05-24 ENCOUNTER — LAB VISIT (OUTPATIENT)
Dept: LAB | Facility: HOSPITAL | Age: 47
End: 2022-05-24
Attending: INTERNAL MEDICINE
Payer: COMMERCIAL

## 2022-05-24 ENCOUNTER — OFFICE VISIT (OUTPATIENT)
Dept: INTERNAL MEDICINE | Facility: CLINIC | Age: 47
End: 2022-05-24
Payer: COMMERCIAL

## 2022-05-24 VITALS
BODY MASS INDEX: 28.31 KG/M2 | DIASTOLIC BLOOD PRESSURE: 84 MMHG | TEMPERATURE: 96 F | HEART RATE: 66 BPM | SYSTOLIC BLOOD PRESSURE: 130 MMHG | OXYGEN SATURATION: 98 % | WEIGHT: 164.88 LBS

## 2022-05-24 DIAGNOSIS — Z00.00 ROUTINE GENERAL MEDICAL EXAMINATION AT A HEALTH CARE FACILITY: ICD-10-CM

## 2022-05-24 DIAGNOSIS — Z12.11 COLON CANCER SCREENING: ICD-10-CM

## 2022-05-24 DIAGNOSIS — D64.9 ANEMIA, UNSPECIFIED TYPE: ICD-10-CM

## 2022-05-24 DIAGNOSIS — Z12.31 ENCOUNTER FOR SCREENING MAMMOGRAM FOR BREAST CANCER: ICD-10-CM

## 2022-05-24 DIAGNOSIS — N83.202 LEFT OVARIAN CYST: Primary | ICD-10-CM

## 2022-05-24 DIAGNOSIS — I10 HYPERTENSION, UNSPECIFIED TYPE: ICD-10-CM

## 2022-05-24 DIAGNOSIS — Z00.00 ROUTINE GENERAL MEDICAL EXAMINATION AT A HEALTH CARE FACILITY: Primary | ICD-10-CM

## 2022-05-24 LAB
ALBUMIN SERPL BCP-MCNC: 3.7 G/DL (ref 3.5–5.2)
ALP SERPL-CCNC: 68 U/L (ref 55–135)
ALT SERPL W/O P-5'-P-CCNC: 13 U/L (ref 10–44)
ANION GAP SERPL CALC-SCNC: 10 MMOL/L (ref 8–16)
AST SERPL-CCNC: 18 U/L (ref 10–40)
BASOPHILS # BLD AUTO: 0.03 K/UL (ref 0–0.2)
BASOPHILS NFR BLD: 0.5 % (ref 0–1.9)
BILIRUB SERPL-MCNC: 0.3 MG/DL (ref 0.1–1)
BUN SERPL-MCNC: 10 MG/DL (ref 6–20)
CALCIUM SERPL-MCNC: 9.3 MG/DL (ref 8.7–10.5)
CHLORIDE SERPL-SCNC: 105 MMOL/L (ref 95–110)
CHOLEST SERPL-MCNC: 211 MG/DL (ref 120–199)
CHOLEST/HDLC SERPL: 3.2 {RATIO} (ref 2–5)
CO2 SERPL-SCNC: 26 MMOL/L (ref 23–29)
CREAT SERPL-MCNC: 0.7 MG/DL (ref 0.5–1.4)
DIFFERENTIAL METHOD: ABNORMAL
EOSINOPHIL # BLD AUTO: 0.3 K/UL (ref 0–0.5)
EOSINOPHIL NFR BLD: 5.2 % (ref 0–8)
ERYTHROCYTE [DISTWIDTH] IN BLOOD BY AUTOMATED COUNT: 15.4 % (ref 11.5–14.5)
EST. GFR  (AFRICAN AMERICAN): >60 ML/MIN/1.73 M^2
EST. GFR  (NON AFRICAN AMERICAN): >60 ML/MIN/1.73 M^2
ESTIMATED AVG GLUCOSE: 94 MG/DL (ref 68–131)
FERRITIN SERPL-MCNC: 13 NG/ML (ref 20–300)
FINAL PATHOLOGIC DIAGNOSIS: NORMAL
GLUCOSE SERPL-MCNC: 77 MG/DL (ref 70–110)
HBA1C MFR BLD: 4.9 % (ref 4–5.6)
HCT VFR BLD AUTO: 34.1 % (ref 37–48.5)
HDLC SERPL-MCNC: 65 MG/DL (ref 40–75)
HDLC SERPL: 30.8 % (ref 20–50)
HGB BLD-MCNC: 10.7 G/DL (ref 12–16)
IMM GRANULOCYTES # BLD AUTO: 0.01 K/UL (ref 0–0.04)
IMM GRANULOCYTES NFR BLD AUTO: 0.2 % (ref 0–0.5)
IRON SERPL-MCNC: 82 UG/DL (ref 30–160)
LDLC SERPL CALC-MCNC: 123.2 MG/DL (ref 63–159)
LYMPHOCYTES # BLD AUTO: 1.2 K/UL (ref 1–4.8)
LYMPHOCYTES NFR BLD: 20.3 % (ref 18–48)
Lab: NORMAL
MCH RBC QN AUTO: 28.6 PG (ref 27–31)
MCHC RBC AUTO-ENTMCNC: 31.4 G/DL (ref 32–36)
MCV RBC AUTO: 91 FL (ref 82–98)
MONOCYTES # BLD AUTO: 0.4 K/UL (ref 0.3–1)
MONOCYTES NFR BLD: 7.6 % (ref 4–15)
NEUTROPHILS # BLD AUTO: 3.8 K/UL (ref 1.8–7.7)
NEUTROPHILS NFR BLD: 66.2 % (ref 38–73)
NONHDLC SERPL-MCNC: 146 MG/DL
NRBC BLD-RTO: 0 /100 WBC
PLATELET # BLD AUTO: 369 K/UL (ref 150–450)
PMV BLD AUTO: 10.1 FL (ref 9.2–12.9)
POTASSIUM SERPL-SCNC: 4.4 MMOL/L (ref 3.5–5.1)
PROT SERPL-MCNC: 6.9 G/DL (ref 6–8.4)
RBC # BLD AUTO: 3.74 M/UL (ref 4–5.4)
SATURATED IRON: 22 % (ref 20–50)
SODIUM SERPL-SCNC: 141 MMOL/L (ref 136–145)
TOTAL IRON BINDING CAPACITY: 377 UG/DL (ref 250–450)
TRANSFERRIN SERPL-MCNC: 255 MG/DL (ref 200–375)
TRIGL SERPL-MCNC: 114 MG/DL (ref 30–150)
TSH SERPL DL<=0.005 MIU/L-ACNC: 0.83 UIU/ML (ref 0.4–4)
WBC # BLD AUTO: 5.77 K/UL (ref 3.9–12.7)

## 2022-05-24 PROCEDURE — 99213 OFFICE O/P EST LOW 20 MIN: CPT | Mod: 25,S$GLB,, | Performed by: INTERNAL MEDICINE

## 2022-05-24 PROCEDURE — 99999 PR PBB SHADOW E&M-EST. PATIENT-LVL V: CPT | Mod: PBBFAC,,, | Performed by: INTERNAL MEDICINE

## 2022-05-24 PROCEDURE — 86803 HEPATITIS C AB TEST: CPT | Performed by: INTERNAL MEDICINE

## 2022-05-24 PROCEDURE — 84466 ASSAY OF TRANSFERRIN: CPT | Performed by: INTERNAL MEDICINE

## 2022-05-24 PROCEDURE — 82728 ASSAY OF FERRITIN: CPT | Performed by: INTERNAL MEDICINE

## 2022-05-24 PROCEDURE — 80053 COMPREHEN METABOLIC PANEL: CPT | Performed by: INTERNAL MEDICINE

## 2022-05-24 PROCEDURE — 1159F MED LIST DOCD IN RCRD: CPT | Mod: CPTII,S$GLB,, | Performed by: INTERNAL MEDICINE

## 2022-05-24 PROCEDURE — 3079F DIAST BP 80-89 MM HG: CPT | Mod: CPTII,S$GLB,, | Performed by: INTERNAL MEDICINE

## 2022-05-24 PROCEDURE — 83036 HEMOGLOBIN GLYCOSYLATED A1C: CPT | Performed by: INTERNAL MEDICINE

## 2022-05-24 PROCEDURE — 36415 COLL VENOUS BLD VENIPUNCTURE: CPT | Performed by: INTERNAL MEDICINE

## 2022-05-24 PROCEDURE — 85025 COMPLETE CBC W/AUTO DIFF WBC: CPT | Performed by: INTERNAL MEDICINE

## 2022-05-24 PROCEDURE — 3075F SYST BP GE 130 - 139MM HG: CPT | Mod: CPTII,S$GLB,, | Performed by: INTERNAL MEDICINE

## 2022-05-24 PROCEDURE — 1159F PR MEDICATION LIST DOCUMENTED IN MEDICAL RECORD: ICD-10-PCS | Mod: CPTII,S$GLB,, | Performed by: INTERNAL MEDICINE

## 2022-05-24 PROCEDURE — 3075F PR MOST RECENT SYSTOLIC BLOOD PRESS GE 130-139MM HG: ICD-10-PCS | Mod: CPTII,S$GLB,, | Performed by: INTERNAL MEDICINE

## 2022-05-24 PROCEDURE — 3008F PR BODY MASS INDEX (BMI) DOCUMENTED: ICD-10-PCS | Mod: CPTII,S$GLB,, | Performed by: INTERNAL MEDICINE

## 2022-05-24 PROCEDURE — 99999 PR PBB SHADOW E&M-EST. PATIENT-LVL V: ICD-10-PCS | Mod: PBBFAC,,, | Performed by: INTERNAL MEDICINE

## 2022-05-24 PROCEDURE — 99396 PREV VISIT EST AGE 40-64: CPT | Mod: 25,S$GLB,, | Performed by: INTERNAL MEDICINE

## 2022-05-24 PROCEDURE — 99396 PR PREVENTIVE VISIT,EST,40-64: ICD-10-PCS | Mod: 25,S$GLB,, | Performed by: INTERNAL MEDICINE

## 2022-05-24 PROCEDURE — 87389 HIV-1 AG W/HIV-1&-2 AB AG IA: CPT | Performed by: INTERNAL MEDICINE

## 2022-05-24 PROCEDURE — 84443 ASSAY THYROID STIM HORMONE: CPT | Performed by: INTERNAL MEDICINE

## 2022-05-24 PROCEDURE — 3079F PR MOST RECENT DIASTOLIC BLOOD PRESSURE 80-89 MM HG: ICD-10-PCS | Mod: CPTII,S$GLB,, | Performed by: INTERNAL MEDICINE

## 2022-05-24 PROCEDURE — 99213 PR OFFICE/OUTPT VISIT, EST, LEVL III, 20-29 MIN: ICD-10-PCS | Mod: 25,S$GLB,, | Performed by: INTERNAL MEDICINE

## 2022-05-24 PROCEDURE — 3008F BODY MASS INDEX DOCD: CPT | Mod: CPTII,S$GLB,, | Performed by: INTERNAL MEDICINE

## 2022-05-24 PROCEDURE — 80061 LIPID PANEL: CPT | Performed by: INTERNAL MEDICINE

## 2022-05-24 RX ORDER — LOSARTAN POTASSIUM 25 MG/1
25 TABLET ORAL DAILY
Qty: 90 TABLET | Refills: 0 | Status: SHIPPED | OUTPATIENT
Start: 2022-05-24 | End: 2022-06-17

## 2022-05-24 NOTE — PROGRESS NOTES
Subjective:      Patient ID: Nisha Mancuso is a 46 y.o. female.    Chief Complaint: Annual Exam    HPI     47 yo with   Patient Active Problem List   Diagnosis    MS (multiple sclerosis)    History of thyroid cancer    Postoperative hypothyroidism    Weight gain    Abnormal thyroid blood test    Constipation    Hypertension     Past Medical History:   Diagnosis Date    Multiple sclerosis     Thyroid cancer     Thyroid cancer 2011     Here today for annual prev exam.  Compliant with meds without significant side effects. Energy and appetite are good.     In also here for management of new diagnosis of hypertension.  BP elevated at 162/102 at recent gyn visit.  Patient has been monitoring her blood pressure at [facility] and pharmacies.  Readings have been 119-147/87-93.    Hypothyroid-Sees saul     Reports tetanus completed 2014.     Review of Systems   Constitutional: Negative for chills and fever.   HENT: Negative for ear pain and sore throat.    Respiratory: Negative for cough, shortness of breath and wheezing.    Cardiovascular: Negative for chest pain.   Gastrointestinal: Negative for abdominal pain, blood in stool, nausea and vomiting.   Genitourinary: Negative for dysuria and hematuria.   Neurological: Positive for dizziness (intermittently, has had vertigo episodes for years). Negative for tremors, seizures, syncope, speech difficulty and weakness.     Objective:   /84 (BP Location: Left arm, Patient Position: Sitting, BP Method: Medium (Manual))   Pulse 66   Temp 96.3 °F (35.7 °C) (Tympanic)   Wt 74.8 kg (164 lb 14.5 oz)   LMP 05/12/2022   SpO2 98%   BMI 28.31 kg/m²     Physical Exam  Constitutional:       General: She is not in acute distress.     Appearance: She is well-developed.   HENT:      Head: Normocephalic and atraumatic.   Eyes:      Pupils: Pupils are equal, round, and reactive to light.   Neck:      Thyroid: No thyromegaly.      Vascular: No carotid bruit.   Cardiovascular:       Rate and Rhythm: Normal rate and regular rhythm.   Pulmonary:      Breath sounds: Normal breath sounds. No wheezing or rales.   Abdominal:      General: Bowel sounds are normal.      Palpations: Abdomen is soft.      Tenderness: There is no abdominal tenderness.   Musculoskeletal:         General: No swelling.      Cervical back: Neck supple.   Lymphadenopathy:      Cervical: No cervical adenopathy.   Skin:     General: Skin is warm and dry.   Neurological:      Mental Status: She is alert and oriented to person, place, and time.   Psychiatric:         Behavior: Behavior normal.         Assessment:     1. Routine general medical examination at a health care facility    2. Anemia, unspecified type    3. Encounter for screening mammogram for breast cancer    4. Colon cancer screening    5. Hypertension, unspecified type      Plan:   Routine general medical examination at a health care facility  Heart healthy diet and reg exercise  HM reviewed  -     Comprehensive Metabolic Panel; Future; Expected date: 05/24/2022  -     CBC Auto Differential; Future; Expected date: 05/24/2022  -     TSH; Future; Expected date: 05/24/2022  -     Lipid Panel; Future; Expected date: 05/24/2022  -     Hepatitis C Antibody; Future; Expected date: 05/24/2022  -     HIV 1/2 Ag/Ab (4th Gen); Future; Expected date: 05/24/2022  -     Hemoglobin A1C; Future; Expected date: 05/24/2022    Anemia, unspecified type  Seeing gyn for heavy cycles.  Poss fibroid  -     Iron and TIBC; Future; Expected date: 05/24/2022  -     Ferritin; Future; Expected date: 05/24/2022    Encounter for screening mammogram for breast cancer  -     Mammo Digital Screening Bilat w/ Tony; Future; Expected date: 05/24/2022    Colon cancer screening  -     Ambulatory referral/consult to Endo Procedure ; Future; Expected date: 05/25/2022    Hypertension, unspecified type  New dx. Start cozaar.   -     losartan (COZAAR) 25 MG tablet; Take 1 tablet (25 mg total) by  mouth once daily.  Dispense: 90 tablet; Refill: 0            Problem List Items Addressed This Visit     Hypertension    Relevant Medications    losartan (COZAAR) 25 MG tablet      Other Visit Diagnoses     Routine general medical examination at a health care facility    -  Primary    Relevant Orders    Comprehensive Metabolic Panel    CBC Auto Differential    TSH    Lipid Panel    Hepatitis C Antibody    HIV 1/2 Ag/Ab (4th Gen)    Hemoglobin A1C    Anemia, unspecified type        Relevant Orders    Iron and TIBC    Ferritin    Encounter for screening mammogram for breast cancer        Relevant Orders    Mammo Digital Screening Bilat w/ Tony    Colon cancer screening        Relevant Orders    Ambulatory referral/consult to Endo Procedure           Follow up in about 3 months (around 8/24/2022), or if symptoms worsen or fail to improve.

## 2022-05-25 LAB
HCV AB SERPL QL IA: NEGATIVE
HIV 1+2 AB+HIV1 P24 AG SERPL QL IA: NEGATIVE

## 2022-05-26 DIAGNOSIS — D64.9 ANEMIA, UNSPECIFIED TYPE: Primary | ICD-10-CM

## 2022-05-26 LAB
FINAL PATHOLOGIC DIAGNOSIS: NORMAL
Lab: NORMAL

## 2022-05-30 ENCOUNTER — PATIENT MESSAGE (OUTPATIENT)
Dept: ADMINISTRATIVE | Facility: HOSPITAL | Age: 47
End: 2022-05-30
Payer: COMMERCIAL

## 2022-05-31 DIAGNOSIS — N93.8 DUB (DYSFUNCTIONAL UTERINE BLEEDING): Primary | ICD-10-CM

## 2022-06-04 ENCOUNTER — LAB VISIT (OUTPATIENT)
Dept: LAB | Facility: HOSPITAL | Age: 47
End: 2022-06-04
Attending: INTERNAL MEDICINE
Payer: COMMERCIAL

## 2022-06-04 DIAGNOSIS — N93.8 DUB (DYSFUNCTIONAL UTERINE BLEEDING): ICD-10-CM

## 2022-06-04 LAB
BASOPHILS # BLD AUTO: 0.02 K/UL (ref 0–0.2)
BASOPHILS NFR BLD: 0.3 % (ref 0–1.9)
DIFFERENTIAL METHOD: ABNORMAL
EOSINOPHIL # BLD AUTO: 0.2 K/UL (ref 0–0.5)
EOSINOPHIL NFR BLD: 3.6 % (ref 0–8)
ERYTHROCYTE [DISTWIDTH] IN BLOOD BY AUTOMATED COUNT: 15.6 % (ref 11.5–14.5)
HCT VFR BLD AUTO: 37.6 % (ref 37–48.5)
HGB BLD-MCNC: 11.7 G/DL (ref 12–16)
IMM GRANULOCYTES # BLD AUTO: 0.01 K/UL (ref 0–0.04)
IMM GRANULOCYTES NFR BLD AUTO: 0.2 % (ref 0–0.5)
LYMPHOCYTES # BLD AUTO: 1.5 K/UL (ref 1–4.8)
LYMPHOCYTES NFR BLD: 23 % (ref 18–48)
MCH RBC QN AUTO: 29.3 PG (ref 27–31)
MCHC RBC AUTO-ENTMCNC: 31.1 G/DL (ref 32–36)
MCV RBC AUTO: 94 FL (ref 82–98)
MONOCYTES # BLD AUTO: 0.6 K/UL (ref 0.3–1)
MONOCYTES NFR BLD: 8.7 % (ref 4–15)
NEUTROPHILS # BLD AUTO: 4.1 K/UL (ref 1.8–7.7)
NEUTROPHILS NFR BLD: 64.2 % (ref 38–73)
NRBC BLD-RTO: 0 /100 WBC
PLATELET # BLD AUTO: 339 K/UL (ref 150–450)
PMV BLD AUTO: 10.3 FL (ref 9.2–12.9)
RBC # BLD AUTO: 3.99 M/UL (ref 4–5.4)
WBC # BLD AUTO: 6.34 K/UL (ref 3.9–12.7)

## 2022-06-04 PROCEDURE — 36415 COLL VENOUS BLD VENIPUNCTURE: CPT | Performed by: NURSE PRACTITIONER

## 2022-06-04 PROCEDURE — 85025 COMPLETE CBC W/AUTO DIFF WBC: CPT | Performed by: NURSE PRACTITIONER

## 2022-06-08 ENCOUNTER — OFFICE VISIT (OUTPATIENT)
Dept: OBSTETRICS AND GYNECOLOGY | Facility: CLINIC | Age: 47
End: 2022-06-08
Payer: COMMERCIAL

## 2022-06-08 VITALS
DIASTOLIC BLOOD PRESSURE: 70 MMHG | BODY MASS INDEX: 27.78 KG/M2 | SYSTOLIC BLOOD PRESSURE: 120 MMHG | WEIGHT: 161.81 LBS

## 2022-06-08 DIAGNOSIS — N83.202 LEFT OVARIAN CYST: ICD-10-CM

## 2022-06-08 DIAGNOSIS — N93.8 DUB (DYSFUNCTIONAL UTERINE BLEEDING): Primary | ICD-10-CM

## 2022-06-08 PROBLEM — K59.00 CONSTIPATION: Status: RESOLVED | Noted: 2020-08-07 | Resolved: 2022-06-08

## 2022-06-08 PROBLEM — R63.5 WEIGHT GAIN: Status: RESOLVED | Noted: 2019-11-22 | Resolved: 2022-06-08

## 2022-06-08 PROBLEM — R79.89 ABNORMAL THYROID BLOOD TEST: Status: RESOLVED | Noted: 2019-11-29 | Resolved: 2022-06-08

## 2022-06-08 PROCEDURE — 4010F ACE/ARB THERAPY RXD/TAKEN: CPT | Mod: CPTII,S$GLB,, | Performed by: OBSTETRICS & GYNECOLOGY

## 2022-06-08 PROCEDURE — 3044F HG A1C LEVEL LT 7.0%: CPT | Mod: CPTII,S$GLB,, | Performed by: OBSTETRICS & GYNECOLOGY

## 2022-06-08 PROCEDURE — 99213 PR OFFICE/OUTPT VISIT, EST, LEVL III, 20-29 MIN: ICD-10-PCS | Mod: S$GLB,,, | Performed by: OBSTETRICS & GYNECOLOGY

## 2022-06-08 PROCEDURE — 3044F PR MOST RECENT HEMOGLOBIN A1C LEVEL <7.0%: ICD-10-PCS | Mod: CPTII,S$GLB,, | Performed by: OBSTETRICS & GYNECOLOGY

## 2022-06-08 PROCEDURE — 3008F PR BODY MASS INDEX (BMI) DOCUMENTED: ICD-10-PCS | Mod: CPTII,S$GLB,, | Performed by: OBSTETRICS & GYNECOLOGY

## 2022-06-08 PROCEDURE — 1160F RVW MEDS BY RX/DR IN RCRD: CPT | Mod: CPTII,S$GLB,, | Performed by: OBSTETRICS & GYNECOLOGY

## 2022-06-08 PROCEDURE — 99213 OFFICE O/P EST LOW 20 MIN: CPT | Mod: S$GLB,,, | Performed by: OBSTETRICS & GYNECOLOGY

## 2022-06-08 PROCEDURE — 99999 PR PBB SHADOW E&M-EST. PATIENT-LVL III: ICD-10-PCS | Mod: PBBFAC,,, | Performed by: OBSTETRICS & GYNECOLOGY

## 2022-06-08 PROCEDURE — 1159F PR MEDICATION LIST DOCUMENTED IN MEDICAL RECORD: ICD-10-PCS | Mod: CPTII,S$GLB,, | Performed by: OBSTETRICS & GYNECOLOGY

## 2022-06-08 PROCEDURE — 3078F PR MOST RECENT DIASTOLIC BLOOD PRESSURE < 80 MM HG: ICD-10-PCS | Mod: CPTII,S$GLB,, | Performed by: OBSTETRICS & GYNECOLOGY

## 2022-06-08 PROCEDURE — 99999 PR PBB SHADOW E&M-EST. PATIENT-LVL III: CPT | Mod: PBBFAC,,, | Performed by: OBSTETRICS & GYNECOLOGY

## 2022-06-08 PROCEDURE — 3008F BODY MASS INDEX DOCD: CPT | Mod: CPTII,S$GLB,, | Performed by: OBSTETRICS & GYNECOLOGY

## 2022-06-08 PROCEDURE — 4010F PR ACE/ARB THEARPY RXD/TAKEN: ICD-10-PCS | Mod: CPTII,S$GLB,, | Performed by: OBSTETRICS & GYNECOLOGY

## 2022-06-08 PROCEDURE — 1159F MED LIST DOCD IN RCRD: CPT | Mod: CPTII,S$GLB,, | Performed by: OBSTETRICS & GYNECOLOGY

## 2022-06-08 PROCEDURE — 3074F SYST BP LT 130 MM HG: CPT | Mod: CPTII,S$GLB,, | Performed by: OBSTETRICS & GYNECOLOGY

## 2022-06-08 PROCEDURE — 3078F DIAST BP <80 MM HG: CPT | Mod: CPTII,S$GLB,, | Performed by: OBSTETRICS & GYNECOLOGY

## 2022-06-08 PROCEDURE — 3074F PR MOST RECENT SYSTOLIC BLOOD PRESSURE < 130 MM HG: ICD-10-PCS | Mod: CPTII,S$GLB,, | Performed by: OBSTETRICS & GYNECOLOGY

## 2022-06-08 PROCEDURE — 1160F PR REVIEW ALL MEDS BY PRESCRIBER/CLIN PHARMACIST DOCUMENTED: ICD-10-PCS | Mod: CPTII,S$GLB,, | Performed by: OBSTETRICS & GYNECOLOGY

## 2022-06-08 RX ORDER — MULTIVITAMIN
1 TABLET ORAL DAILY
COMMUNITY

## 2022-06-08 RX ORDER — LANOLIN ALCOHOL/MO/W.PET/CERES
1 CREAM (GRAM) TOPICAL
COMMUNITY

## 2022-06-08 NOTE — PROGRESS NOTES
Subjective:       Patient ID: Nisha Mancuso is a 46 y.o. female.    Chief Complaint:  Consult      History of Present Illness  HPI  Referral from NP in gyn for benign DYSFUNCTIONAL UTERINE BLEEDING   Embx is normal   Pap normal   Ultrasound with normal uterus and endometrium.  Small ovarian cyst   On provera with no effect on the bleeding pattern   Discussed results and presented with two options   Hysteroscopy with ablation   Hysterectomy   Reviewed pros and cons of each option and answered all questions.  Time spent 20 min   Health Maintenance   Topic Date Due    TETANUS VACCINE  Never done    Mammogram  Never done    Lipid Panel  2027    Hepatitis C Screening  Completed     GYN & OB History  Patient's last menstrual period was 2022.   Date of Last Pap: 2022    OB History    Para Term  AB Living   4       1 3   SAB IAB Ectopic Multiple Live Births   1              # Outcome Date GA Lbr Parminder/2nd Weight Sex Delivery Anes PTL Lv   4             3             2             1 SAB                Review of Systems  Review of Systems        Objective:   /70   Wt 73.4 kg (161 lb 13.1 oz)   LMP 2022   BMI 27.78 kg/m²    Physical Exam     Assessment:        1. DUB (dysfunctional uterine bleeding)    2. Left ovarian cyst                Plan:            Nisha was seen today for consult.    Diagnoses and all orders for this visit:    DUB (dysfunctional uterine bleeding)    Left ovarian cyst    Consider hysteroscopy with ablation or robotic hysterectomy

## 2022-06-13 ENCOUNTER — HOSPITAL ENCOUNTER (OUTPATIENT)
Dept: PREADMISSION TESTING | Facility: HOSPITAL | Age: 47
Discharge: HOME | End: 2022-06-13
Attending: INTERNAL MEDICINE
Payer: COMMERCIAL

## 2022-06-13 DIAGNOSIS — Z12.11 COLON CANCER SCREENING: Primary | ICD-10-CM

## 2022-06-13 RX ORDER — SODIUM, POTASSIUM,MAG SULFATES 17.5-3.13G
1 SOLUTION, RECONSTITUTED, ORAL ORAL DAILY
Qty: 1 KIT | Refills: 0 | Status: SHIPPED | OUTPATIENT
Start: 2022-06-13 | End: 2022-06-15

## 2022-06-17 ENCOUNTER — HOSPITAL ENCOUNTER (OUTPATIENT)
Dept: RADIOLOGY | Facility: HOSPITAL | Age: 47
Discharge: HOME | End: 2022-06-17
Attending: INTERNAL MEDICINE
Payer: COMMERCIAL

## 2022-06-17 ENCOUNTER — OFFICE VISIT (OUTPATIENT)
Dept: INTERNAL MEDICINE | Facility: CLINIC | Age: 47
End: 2022-06-17
Payer: COMMERCIAL

## 2022-06-17 VITALS
SYSTOLIC BLOOD PRESSURE: 137 MMHG | HEIGHT: 64 IN | TEMPERATURE: 98 F | BODY MASS INDEX: 27.77 KG/M2 | WEIGHT: 162.69 LBS | DIASTOLIC BLOOD PRESSURE: 88 MMHG | HEART RATE: 75 BPM | OXYGEN SATURATION: 99 %

## 2022-06-17 DIAGNOSIS — G35 MS (MULTIPLE SCLEROSIS): Chronic | ICD-10-CM

## 2022-06-17 DIAGNOSIS — M25.561 ACUTE PAIN OF BOTH KNEES: ICD-10-CM

## 2022-06-17 DIAGNOSIS — M25.562 ACUTE PAIN OF BOTH KNEES: Primary | ICD-10-CM

## 2022-06-17 DIAGNOSIS — I10 HYPERTENSION, UNSPECIFIED TYPE: ICD-10-CM

## 2022-06-17 DIAGNOSIS — M25.561 ACUTE PAIN OF BOTH KNEES: Primary | ICD-10-CM

## 2022-06-17 DIAGNOSIS — M25.562 ACUTE PAIN OF BOTH KNEES: ICD-10-CM

## 2022-06-17 PROCEDURE — 3044F PR MOST RECENT HEMOGLOBIN A1C LEVEL <7.0%: ICD-10-PCS | Mod: CPTII,S$GLB,, | Performed by: INTERNAL MEDICINE

## 2022-06-17 PROCEDURE — 99214 PR OFFICE/OUTPT VISIT, EST, LEVL IV, 30-39 MIN: ICD-10-PCS | Mod: S$GLB,,, | Performed by: INTERNAL MEDICINE

## 2022-06-17 PROCEDURE — 1159F PR MEDICATION LIST DOCUMENTED IN MEDICAL RECORD: ICD-10-PCS | Mod: CPTII,S$GLB,, | Performed by: INTERNAL MEDICINE

## 2022-06-17 PROCEDURE — 3079F PR MOST RECENT DIASTOLIC BLOOD PRESSURE 80-89 MM HG: ICD-10-PCS | Mod: CPTII,S$GLB,, | Performed by: INTERNAL MEDICINE

## 2022-06-17 PROCEDURE — 99999 PR PBB SHADOW E&M-EST. PATIENT-LVL V: CPT | Mod: PBBFAC,,, | Performed by: INTERNAL MEDICINE

## 2022-06-17 PROCEDURE — 3075F PR MOST RECENT SYSTOLIC BLOOD PRESS GE 130-139MM HG: ICD-10-PCS | Mod: CPTII,S$GLB,, | Performed by: INTERNAL MEDICINE

## 2022-06-17 PROCEDURE — 4010F ACE/ARB THERAPY RXD/TAKEN: CPT | Mod: CPTII,S$GLB,, | Performed by: INTERNAL MEDICINE

## 2022-06-17 PROCEDURE — 3008F PR BODY MASS INDEX (BMI) DOCUMENTED: ICD-10-PCS | Mod: CPTII,S$GLB,, | Performed by: INTERNAL MEDICINE

## 2022-06-17 PROCEDURE — 3075F SYST BP GE 130 - 139MM HG: CPT | Mod: CPTII,S$GLB,, | Performed by: INTERNAL MEDICINE

## 2022-06-17 PROCEDURE — 1159F MED LIST DOCD IN RCRD: CPT | Mod: CPTII,S$GLB,, | Performed by: INTERNAL MEDICINE

## 2022-06-17 PROCEDURE — 3008F BODY MASS INDEX DOCD: CPT | Mod: CPTII,S$GLB,, | Performed by: INTERNAL MEDICINE

## 2022-06-17 PROCEDURE — 3079F DIAST BP 80-89 MM HG: CPT | Mod: CPTII,S$GLB,, | Performed by: INTERNAL MEDICINE

## 2022-06-17 PROCEDURE — 73562 XR KNEE ORTHO BILAT: ICD-10-PCS | Mod: 26,,, | Performed by: RADIOLOGY

## 2022-06-17 PROCEDURE — 99999 PR PBB SHADOW E&M-EST. PATIENT-LVL V: ICD-10-PCS | Mod: PBBFAC,,, | Performed by: INTERNAL MEDICINE

## 2022-06-17 PROCEDURE — 99214 OFFICE O/P EST MOD 30 MIN: CPT | Mod: S$GLB,,, | Performed by: INTERNAL MEDICINE

## 2022-06-17 PROCEDURE — 73562 X-RAY EXAM OF KNEE 3: CPT | Mod: TC,50

## 2022-06-17 PROCEDURE — 73562 X-RAY EXAM OF KNEE 3: CPT | Mod: 26,,, | Performed by: RADIOLOGY

## 2022-06-17 PROCEDURE — 4010F PR ACE/ARB THEARPY RXD/TAKEN: ICD-10-PCS | Mod: CPTII,S$GLB,, | Performed by: INTERNAL MEDICINE

## 2022-06-17 PROCEDURE — 3044F HG A1C LEVEL LT 7.0%: CPT | Mod: CPTII,S$GLB,, | Performed by: INTERNAL MEDICINE

## 2022-06-17 RX ORDER — NAPROXEN 500 MG/1
500 TABLET ORAL 2 TIMES DAILY WITH MEALS
Qty: 15 TABLET | Refills: 0 | Status: SHIPPED | OUTPATIENT
Start: 2022-06-17 | End: 2023-04-04 | Stop reason: SDUPTHER

## 2022-06-17 RX ORDER — IBUPROFEN 800 MG/1
800 TABLET ORAL 3 TIMES DAILY PRN
COMMUNITY
Start: 2022-06-04

## 2022-06-17 RX ORDER — LOSARTAN POTASSIUM 50 MG/1
50 TABLET ORAL DAILY
Qty: 90 TABLET | Refills: 0 | Status: SHIPPED | OUTPATIENT
Start: 2022-06-17 | End: 2022-08-24 | Stop reason: SDUPTHER

## 2022-06-18 ENCOUNTER — LAB VISIT (OUTPATIENT)
Dept: LAB | Facility: HOSPITAL | Age: 47
End: 2022-06-18
Attending: INTERNAL MEDICINE
Payer: COMMERCIAL

## 2022-06-18 DIAGNOSIS — D64.9 ANEMIA, UNSPECIFIED TYPE: ICD-10-CM

## 2022-06-18 LAB
BASOPHILS # BLD AUTO: 0.04 K/UL (ref 0–0.2)
BASOPHILS NFR BLD: 0.7 % (ref 0–1.9)
DIFFERENTIAL METHOD: ABNORMAL
EOSINOPHIL # BLD AUTO: 0.3 K/UL (ref 0–0.5)
EOSINOPHIL NFR BLD: 4.6 % (ref 0–8)
ERYTHROCYTE [DISTWIDTH] IN BLOOD BY AUTOMATED COUNT: 15.5 % (ref 11.5–14.5)
FOLATE SERPL-MCNC: 13.5 NG/ML (ref 4–24)
HCT VFR BLD AUTO: 36.9 % (ref 37–48.5)
HGB BLD-MCNC: 11.3 G/DL (ref 12–16)
IMM GRANULOCYTES # BLD AUTO: 0.01 K/UL (ref 0–0.04)
IMM GRANULOCYTES NFR BLD AUTO: 0.2 % (ref 0–0.5)
LYMPHOCYTES # BLD AUTO: 1.5 K/UL (ref 1–4.8)
LYMPHOCYTES NFR BLD: 27.6 % (ref 18–48)
MCH RBC QN AUTO: 29.6 PG (ref 27–31)
MCHC RBC AUTO-ENTMCNC: 30.6 G/DL (ref 32–36)
MCV RBC AUTO: 97 FL (ref 82–98)
MONOCYTES # BLD AUTO: 0.4 K/UL (ref 0.3–1)
MONOCYTES NFR BLD: 6.6 % (ref 4–15)
NEUTROPHILS # BLD AUTO: 3.3 K/UL (ref 1.8–7.7)
NEUTROPHILS NFR BLD: 60.3 % (ref 38–73)
NRBC BLD-RTO: 0 /100 WBC
PLATELET # BLD AUTO: 367 K/UL (ref 150–450)
PMV BLD AUTO: 10.4 FL (ref 9.2–12.9)
RBC # BLD AUTO: 3.82 M/UL (ref 4–5.4)
VIT B12 SERPL-MCNC: 518 PG/ML (ref 210–950)
WBC # BLD AUTO: 5.44 K/UL (ref 3.9–12.7)

## 2022-06-18 PROCEDURE — 82746 ASSAY OF FOLIC ACID SERUM: CPT | Performed by: INTERNAL MEDICINE

## 2022-06-18 PROCEDURE — 36415 COLL VENOUS BLD VENIPUNCTURE: CPT | Performed by: INTERNAL MEDICINE

## 2022-06-18 PROCEDURE — 82607 VITAMIN B-12: CPT | Performed by: INTERNAL MEDICINE

## 2022-06-18 PROCEDURE — 85025 COMPLETE CBC W/AUTO DIFF WBC: CPT | Performed by: INTERNAL MEDICINE

## 2022-06-24 ENCOUNTER — PATIENT MESSAGE (OUTPATIENT)
Dept: PSYCHIATRY | Facility: CLINIC | Age: 47
End: 2022-06-24
Payer: COMMERCIAL

## 2022-08-24 ENCOUNTER — OFFICE VISIT (OUTPATIENT)
Dept: INTERNAL MEDICINE | Facility: CLINIC | Age: 47
End: 2022-08-24
Payer: COMMERCIAL

## 2022-08-24 VITALS
HEART RATE: 90 BPM | DIASTOLIC BLOOD PRESSURE: 82 MMHG | BODY MASS INDEX: 28.49 KG/M2 | HEIGHT: 64 IN | OXYGEN SATURATION: 98 % | WEIGHT: 166.88 LBS | SYSTOLIC BLOOD PRESSURE: 114 MMHG | TEMPERATURE: 97 F

## 2022-08-24 DIAGNOSIS — I10 HYPERTENSION, UNSPECIFIED TYPE: Primary | ICD-10-CM

## 2022-08-24 DIAGNOSIS — Z00.00 PREVENTATIVE HEALTH CARE: ICD-10-CM

## 2022-08-24 DIAGNOSIS — Z12.11 COLON CANCER SCREENING: ICD-10-CM

## 2022-08-24 PROCEDURE — 3074F SYST BP LT 130 MM HG: CPT | Mod: CPTII,S$GLB,, | Performed by: INTERNAL MEDICINE

## 2022-08-24 PROCEDURE — 3079F PR MOST RECENT DIASTOLIC BLOOD PRESSURE 80-89 MM HG: ICD-10-PCS | Mod: CPTII,S$GLB,, | Performed by: INTERNAL MEDICINE

## 2022-08-24 PROCEDURE — 1159F PR MEDICATION LIST DOCUMENTED IN MEDICAL RECORD: ICD-10-PCS | Mod: CPTII,S$GLB,, | Performed by: INTERNAL MEDICINE

## 2022-08-24 PROCEDURE — 3079F DIAST BP 80-89 MM HG: CPT | Mod: CPTII,S$GLB,, | Performed by: INTERNAL MEDICINE

## 2022-08-24 PROCEDURE — 4010F PR ACE/ARB THEARPY RXD/TAKEN: ICD-10-PCS | Mod: CPTII,S$GLB,, | Performed by: INTERNAL MEDICINE

## 2022-08-24 PROCEDURE — 99999 PR PBB SHADOW E&M-EST. PATIENT-LVL IV: CPT | Mod: PBBFAC,,, | Performed by: INTERNAL MEDICINE

## 2022-08-24 PROCEDURE — 1159F MED LIST DOCD IN RCRD: CPT | Mod: CPTII,S$GLB,, | Performed by: INTERNAL MEDICINE

## 2022-08-24 PROCEDURE — 3008F BODY MASS INDEX DOCD: CPT | Mod: CPTII,S$GLB,, | Performed by: INTERNAL MEDICINE

## 2022-08-24 PROCEDURE — 3044F PR MOST RECENT HEMOGLOBIN A1C LEVEL <7.0%: ICD-10-PCS | Mod: CPTII,S$GLB,, | Performed by: INTERNAL MEDICINE

## 2022-08-24 PROCEDURE — 99213 OFFICE O/P EST LOW 20 MIN: CPT | Mod: S$GLB,,, | Performed by: INTERNAL MEDICINE

## 2022-08-24 PROCEDURE — 99999 PR PBB SHADOW E&M-EST. PATIENT-LVL IV: ICD-10-PCS | Mod: PBBFAC,,, | Performed by: INTERNAL MEDICINE

## 2022-08-24 PROCEDURE — 3074F PR MOST RECENT SYSTOLIC BLOOD PRESSURE < 130 MM HG: ICD-10-PCS | Mod: CPTII,S$GLB,, | Performed by: INTERNAL MEDICINE

## 2022-08-24 PROCEDURE — 3044F HG A1C LEVEL LT 7.0%: CPT | Mod: CPTII,S$GLB,, | Performed by: INTERNAL MEDICINE

## 2022-08-24 PROCEDURE — 4010F ACE/ARB THERAPY RXD/TAKEN: CPT | Mod: CPTII,S$GLB,, | Performed by: INTERNAL MEDICINE

## 2022-08-24 PROCEDURE — 3008F PR BODY MASS INDEX (BMI) DOCUMENTED: ICD-10-PCS | Mod: CPTII,S$GLB,, | Performed by: INTERNAL MEDICINE

## 2022-08-24 PROCEDURE — 99213 PR OFFICE/OUTPT VISIT, EST, LEVL III, 20-29 MIN: ICD-10-PCS | Mod: S$GLB,,, | Performed by: INTERNAL MEDICINE

## 2022-08-24 RX ORDER — LOSARTAN POTASSIUM 50 MG/1
50 TABLET ORAL DAILY
Qty: 90 TABLET | Refills: 1 | Status: SHIPPED | OUTPATIENT
Start: 2022-08-24 | End: 2023-03-14

## 2022-08-24 RX ORDER — LEVOTHYROXINE SODIUM 137 UG/1
137 TABLET ORAL
COMMUNITY
Start: 2022-08-09 | End: 2023-05-25 | Stop reason: SDUPTHER

## 2022-08-24 RX ORDER — MECLIZINE HYDROCHLORIDE 25 MG/1
25 TABLET ORAL 3 TIMES DAILY PRN
COMMUNITY
Start: 2022-07-29

## 2022-08-24 NOTE — PROGRESS NOTES
"Subjective:      Patient ID: Nisha Mancuso is a 47 y.o. female.    Chief Complaint: 3mth fu    HPI     46yo  Here today for management of hypertension.  She reports compliance with medications without significant side effects.  Home bp 115 to 119/70s. Thinks tetanus 2014.   Review of Systems   Constitutional: Negative for chills and fever.   Respiratory: Negative for cough.    Cardiovascular: Negative for chest pain.   Gastrointestinal: Negative for abdominal pain.     Objective:   /82 (BP Location: Right arm, Patient Position: Sitting, BP Method: Large (Manual))   Pulse 90   Temp 97.3 °F (36.3 °C) (Tympanic)   Ht 5' 4" (1.626 m)   Wt 75.7 kg (166 lb 14.2 oz)   SpO2 98%   BMI 28.65 kg/m²     Physical Exam  Constitutional:       General: She is awake.      Appearance: Normal appearance.   HENT:      Head: Normocephalic and atraumatic.   Eyes:      Conjunctiva/sclera: Conjunctivae normal.   Pulmonary:      Effort: Pulmonary effort is normal.   Musculoskeletal:      Cervical back: Normal range of motion.   Neurological:      Mental Status: She is alert. Mental status is at baseline.   Psychiatric:         Mood and Affect: Mood normal.         Behavior: Behavior normal. Behavior is cooperative.         Thought Content: Thought content normal.         Judgment: Judgment normal.         Assessment:     1. Hypertension, unspecified type    2. Colon cancer screening    3. Preventative health care      Plan:   Hypertension, unspecified type  Controlled  Cont current meds  -     losartan (COZAAR) 50 MG tablet; Take 1 tablet (50 mg total) by mouth once daily.  Dispense: 90 tablet; Refill: 1    Colon cancer screening    Preventative health care  -     Lipid Panel; Future; Expected date: 02/20/2023  -     Hemoglobin A1C; Future; Expected date: 02/20/2023  -     Comprehensive Metabolic Panel; Future; Expected date: 02/20/2023  -     CBC Auto Differential; Future; Expected date: 02/20/2023        Lab Frequency Next " Occurrence   Mammo Digital Screening Bilat w/ Tony Once 05/24/2022   US Pelvis Complete Non OB Once 05/24/2022       Problem List Items Addressed This Visit     Hypertension - Primary    Relevant Medications    losartan (COZAAR) 50 MG tablet      Other Visit Diagnoses     Colon cancer screening        Preventative health care        Relevant Orders    Lipid Panel    Hemoglobin A1C    Comprehensive Metabolic Panel    CBC Auto Differential          No follow-ups on file.

## 2022-09-03 DIAGNOSIS — Z12.11 COLON CANCER SCREENING: Primary | ICD-10-CM

## 2022-10-03 ENCOUNTER — PATIENT MESSAGE (OUTPATIENT)
Dept: PREADMISSION TESTING | Facility: HOSPITAL | Age: 47
End: 2022-10-03
Payer: COMMERCIAL

## 2022-10-04 ENCOUNTER — TELEPHONE (OUTPATIENT)
Dept: PREADMISSION TESTING | Facility: HOSPITAL | Age: 47
End: 2022-10-04
Payer: COMMERCIAL

## 2022-10-04 DIAGNOSIS — Z12.11 COLON CANCER SCREENING: Primary | ICD-10-CM

## 2022-10-05 ENCOUNTER — PATIENT MESSAGE (OUTPATIENT)
Dept: OBSTETRICS AND GYNECOLOGY | Facility: CLINIC | Age: 47
End: 2022-10-05
Payer: COMMERCIAL

## 2022-10-12 ENCOUNTER — ANESTHESIA EVENT (OUTPATIENT)
Dept: ENDOSCOPY | Facility: HOSPITAL | Age: 47
End: 2022-10-12
Payer: COMMERCIAL

## 2022-10-12 NOTE — ANESTHESIA PREPROCEDURE EVALUATION
10/12/2022  Nisha Mancuso is a 47 y.o., female.  Patient Active Problem List   Diagnosis    MS (multiple sclerosis)    History of thyroid cancer    Postoperative hypothyroidism    Hypertension           Pre-op Assessment    I have reviewed the Patient Summary Reports.     I have reviewed the Nursing Notes. I have reviewed the NPO Status.   I have reviewed the Medications.     Review of Systems  Anesthesia Hx:  No problems with previous Anesthesia  Denies Family Hx of Anesthesia complications.   Denies Personal Hx of Anesthesia complications.   Social:  Non-Smoker, No Alcohol Use    Hematology/Oncology:  Hematology Normal       -- Cancer in past history:  Oncology Comments: thyroid CA      EENT/Dental:EENT/Dental Normal   Cardiovascular:   Hypertension    Pulmonary:  Pulmonary Normal    Renal/:  Renal/ Normal     Hepatic/GI:   Bowel Prep.    Musculoskeletal:  Musculoskeletal Normal    Neurological:   MS   Endocrine:   Hypothyroidism    Dermatological:  Skin Normal    Psych:  Psychiatric Normal           Physical Exam  General: Well nourished, Cooperative, Alert and Oriented    Airway:  Mouth Opening: Normal  TM Distance: Normal  Tongue: Normal  Neck ROM: Normal ROM    Dental:  Intact    Chest/Lungs:  Clear to auscultation, Normal Respiratory Rate    Heart:  Rate: Normal  Rhythm: Regular Rhythm        Anesthesia Plan  Type of Anesthesia, risks & benefits discussed:    Anesthesia Type: Gen Natural Airway  Intra-op Monitoring Plan: Standard ASA Monitors  Post Op Pain Control Plan: multimodal analgesia  Induction:  IV  Informed Consent: Informed consent signed with the Patient and all parties understand the risks and agree with anesthesia plan.  All questions answered. Patient consented to blood products? No  ASA Score: 2  Day of Surgery Review of History & Physical: H&P Update referred to the  surgeon/provider.    Ready For Surgery From Anesthesia Perspective.     .

## 2022-10-14 ENCOUNTER — HOSPITAL ENCOUNTER (OUTPATIENT)
Facility: HOSPITAL | Age: 47
Discharge: HOME | End: 2022-10-14
Attending: INTERNAL MEDICINE | Admitting: INTERNAL MEDICINE
Payer: COMMERCIAL

## 2022-10-14 ENCOUNTER — ANESTHESIA (OUTPATIENT)
Dept: ENDOSCOPY | Facility: HOSPITAL | Age: 47
End: 2022-10-14
Payer: COMMERCIAL

## 2022-10-14 VITALS
WEIGHT: 164.13 LBS | HEART RATE: 69 BPM | TEMPERATURE: 98 F | HEIGHT: 64 IN | SYSTOLIC BLOOD PRESSURE: 143 MMHG | BODY MASS INDEX: 28.02 KG/M2 | RESPIRATION RATE: 16 BRPM | DIASTOLIC BLOOD PRESSURE: 89 MMHG | OXYGEN SATURATION: 100 %

## 2022-10-14 DIAGNOSIS — Z12.11 ENCOUNTER FOR SCREENING COLONOSCOPY: Primary | ICD-10-CM

## 2022-10-14 LAB
B-HCG UR QL: NEGATIVE
CTP QC/QA: YES

## 2022-10-14 PROCEDURE — 63600175 PHARM REV CODE 636 W HCPCS: Performed by: NURSE ANESTHETIST, CERTIFIED REGISTERED

## 2022-10-14 PROCEDURE — 81025 URINE PREGNANCY TEST: CPT | Performed by: INTERNAL MEDICINE

## 2022-10-14 PROCEDURE — D9220A PRA ANESTHESIA: ICD-10-PCS | Mod: 33,CRNA,, | Performed by: NURSE ANESTHETIST, CERTIFIED REGISTERED

## 2022-10-14 PROCEDURE — 45380 COLONOSCOPY AND BIOPSY: CPT | Mod: PT,59 | Performed by: INTERNAL MEDICINE

## 2022-10-14 PROCEDURE — 45385 COLONOSCOPY W/LESION REMOVAL: CPT | Mod: PT | Performed by: INTERNAL MEDICINE

## 2022-10-14 PROCEDURE — D9220A PRA ANESTHESIA: Mod: 33,CRNA,, | Performed by: NURSE ANESTHETIST, CERTIFIED REGISTERED

## 2022-10-14 PROCEDURE — 45385 PR COLONOSCOPY,REMV LESN,SNARE: ICD-10-PCS | Mod: 33,,, | Performed by: INTERNAL MEDICINE

## 2022-10-14 PROCEDURE — 63600175 PHARM REV CODE 636 W HCPCS: Performed by: INTERNAL MEDICINE

## 2022-10-14 PROCEDURE — D9220A PRA ANESTHESIA: ICD-10-PCS | Mod: 33,ANES,, | Performed by: ANESTHESIOLOGY

## 2022-10-14 PROCEDURE — 45380 COLONOSCOPY AND BIOPSY: CPT | Mod: 33,59,, | Performed by: INTERNAL MEDICINE

## 2022-10-14 PROCEDURE — 27201012 HC FORCEPS, HOT/COLD, DISP: Performed by: INTERNAL MEDICINE

## 2022-10-14 PROCEDURE — 27201089 HC SNARE, DISP (ANY): Performed by: INTERNAL MEDICINE

## 2022-10-14 PROCEDURE — D9220A PRA ANESTHESIA: Mod: 33,ANES,, | Performed by: ANESTHESIOLOGY

## 2022-10-14 PROCEDURE — 37000009 HC ANESTHESIA EA ADD 15 MINS: Performed by: INTERNAL MEDICINE

## 2022-10-14 PROCEDURE — 88305 TISSUE EXAM BY PATHOLOGIST: ICD-10-PCS | Mod: 26,,, | Performed by: STUDENT IN AN ORGANIZED HEALTH CARE EDUCATION/TRAINING PROGRAM

## 2022-10-14 PROCEDURE — 45385 COLONOSCOPY W/LESION REMOVAL: CPT | Mod: 33,,, | Performed by: INTERNAL MEDICINE

## 2022-10-14 PROCEDURE — 37000008 HC ANESTHESIA 1ST 15 MINUTES: Performed by: INTERNAL MEDICINE

## 2022-10-14 PROCEDURE — 45380 PR COLONOSCOPY,BIOPSY: ICD-10-PCS | Mod: 33,59,, | Performed by: INTERNAL MEDICINE

## 2022-10-14 PROCEDURE — 88305 TISSUE EXAM BY PATHOLOGIST: CPT | Mod: 26,,, | Performed by: STUDENT IN AN ORGANIZED HEALTH CARE EDUCATION/TRAINING PROGRAM

## 2022-10-14 PROCEDURE — 88305 TISSUE EXAM BY PATHOLOGIST: CPT | Performed by: STUDENT IN AN ORGANIZED HEALTH CARE EDUCATION/TRAINING PROGRAM

## 2022-10-14 RX ORDER — LIDOCAINE HCL/PF 100 MG/5ML
SYRINGE (ML) INTRAVENOUS
Status: DISCONTINUED | OUTPATIENT
Start: 2022-10-14 | End: 2022-10-14

## 2022-10-14 RX ORDER — SODIUM CHLORIDE, SODIUM LACTATE, POTASSIUM CHLORIDE, CALCIUM CHLORIDE 600; 310; 30; 20 MG/100ML; MG/100ML; MG/100ML; MG/100ML
INJECTION, SOLUTION INTRAVENOUS CONTINUOUS
Status: DISCONTINUED | OUTPATIENT
Start: 2022-10-14 | End: 2022-10-14 | Stop reason: HOSPADM

## 2022-10-14 RX ORDER — PROPOFOL 10 MG/ML
INJECTION, EMULSION INTRAVENOUS
Status: DISCONTINUED | OUTPATIENT
Start: 2022-10-14 | End: 2022-10-14

## 2022-10-14 RX ADMIN — PROPOFOL 30 MG: 10 INJECTION, EMULSION INTRAVENOUS at 09:10

## 2022-10-14 RX ADMIN — PROPOFOL 120 MG: 10 INJECTION, EMULSION INTRAVENOUS at 09:10

## 2022-10-14 RX ADMIN — SODIUM CHLORIDE, SODIUM LACTATE, POTASSIUM CHLORIDE, AND CALCIUM CHLORIDE: 600; 310; 30; 20 INJECTION, SOLUTION INTRAVENOUS at 08:10

## 2022-10-14 RX ADMIN — PROPOFOL 20 MG: 10 INJECTION, EMULSION INTRAVENOUS at 09:10

## 2022-10-14 RX ADMIN — Medication 60 MG: at 09:10

## 2022-10-14 RX ADMIN — PROPOFOL 50 MG: 10 INJECTION, EMULSION INTRAVENOUS at 09:10

## 2022-10-14 RX ADMIN — SODIUM CHLORIDE, SODIUM LACTATE, POTASSIUM CHLORIDE, AND CALCIUM CHLORIDE: 600; 310; 30; 20 INJECTION, SOLUTION INTRAVENOUS at 09:10

## 2022-10-14 NOTE — [employer] PATIENT INSTRUCTIONS
Discharge Summary/Instructions after an Endoscopic Procedure  Patient Name: Nisha Mancuso  Patient MRN: [id number]  Patient YOB: 1975 Friday, October 14, 2022  Geetha Alonso MD  Dear patient,  As a result of recent federal legislation (The Federal Cures Act), you may   receive lab or pathology results from your procedure in your [facility]   account before your physician is able to contact you. Your physician or   their representative will relay the results to you with their   recommendations at their soonest availability.  Thank you,  RESTRICTIONS:  During your procedure today, you received medications for sedation.  These   medications may affect your judgment, balance and coordination.  Therefore,   for 24 hours, you have the following restrictions:   - DO NOT drive a car, operate machinery, make legal/financial decisions,   sign important papers or drink alcohol.    ACTIVITY:  Today: no heavy lifting, straining or running due to procedural   sedation/anesthesia.  The following day: return to full activity including work.  DIET:  Eat and drink normally unless instructed otherwise.     TREATMENT FOR COMMON SIDE EFFECTS:  - Mild abdominal pain, nausea, belching, bloating or excessive gas:  rest,   eat lightly and use a heating pad.  - Sore Throat: treat with throat lozenges and/or gargle with warm salt   water.  - Because air was used during the procedure, expelling large amounts of air   from your rectum or belching is normal.  - If a bowel prep was taken, you may not have a bowel movement for 1-3 days.    This is normal.  SYMPTOMS TO WATCH FOR AND REPORT TO YOUR PHYSICIAN:  1. Abdominal pain or bloating, other than gas cramps.  2. Chest pain.  3. Back pain.  4. Signs of infection such as: chills or fever occurring within 24 hours   after the procedure.  5. Rectal bleeding, which would show as bright red, maroon, or black stools.   (A tablespoon of blood from the rectum is not serious, especially  if   hemorrhoids are present.)  6. Vomiting.  7. Weakness or dizziness.  GO DIRECTLY TO THE NEAREST EMERGENCY ROOM IF YOU HAVE ANY OF THE FOLLOWING:      Difficulty breathing              Chills and/or fever over 101 F   Persistent vomiting and/or vomiting blood   Severe abdominal pain   Severe chest pain   Black, tarry stools   Bleeding- more than one tablespoon   Any other symptom or condition that you feel may need urgent attention  Your doctor recommends these additional instructions:  If any biopsies were taken, your doctors clinic will contact you in 1 to 2   weeks with any results.  - Discharge patient to home (via wheelchair).   - Resume previous diet.   - Continue present medications.   - Await pathology results.   - Repeat colonoscopy in 7 years for surveillance.   - Telephone GI clinic for pathology results in 2 weeks.   - Patient has a contact number available for emergencies.  The signs and   symptoms of potential delayed complications were discussed with the   patient.  Return to normal activities tomorrow.  Written discharge   instructions were provided to the patient.  For questions, problems or results please call your physician Geetha Aolnso MD at Work:  [phone]  If you have any questions about the above instructions, call the GI   department at [phone] or call the endoscopy unit at [phone]   from 7am until 3 pm.  [facility] [place], EMERGENCY ROOM PHONE NUMBER:   [phone]  IF A COMPLICATION OR EMERGENCY SITUATION ARISES AND YOU ARE UNABLE TO REACH   YOUR PHYSICIAN - GO DIRECTLY TO THE EMERGENCY ROOM.  I have read or have had read to me these discharge instructions for my   procedure and have received a written copy.  I understand these   instructions and will follow-up with my physician if I have any questions.     __________________________________       _____________________________________  Nurse Signature                                           Patient/Designated   Responsible Party Signature  MD Geetha Sanchez MD  10/14/2022 9:30:41 AM  [employer]

## 2022-10-14 NOTE — H&P
Short Stay Endoscopy History and Physical    PCP - Kit Apple MD    Procedure - Colonoscopy  ASA - 2  Mallampati - per anesthesia  History of Anesthesia problems - no  Family history Anesthesia problems -  no     HPI:  This is a 47 y.o. female here for evaluation of :   Active Hospital Problems    Diagnosis  POA    *Encounter for screening colonoscopy [Z12.11]  Not Applicable      Resolved Hospital Problems   No resolved problems to display.         Health Maintenance         Date Due Completion Date    TETANUS VACCINE Never done ---    Mammogram Never done ---    Colorectal Cancer Screening Never done ---    COVID-19 Vaccine (3 - Booster for Jane series) 01/07/2022 11/12/2021    Influenza Vaccine (1) 09/01/2022 10/18/2019    Cervical Cancer Screening 05/18/2027 5/18/2022    Lipid Panel 05/24/2027 5/24/2022            ROS:  CONSTITUTIONAL: Denies weight change,  fatigue, fevers, chills, night sweats.  CARDIOVASCULAR: Denies chest pain, shortness of breath, orthopnea and edema.  RESPIRATORY: Denies cough, hemoptysis, dyspnea, and wheezing.  GI: See HPI.    Medical History:   Past Medical History:   Diagnosis Date    Multiple sclerosis     Thyroid cancer     Thyroid cancer 2011       Surgical History:   Past Surgical History:   Procedure Laterality Date    THYROIDECTOMY         Family History:   Family History   Problem Relation Age of Onset    Arthritis Mother     Hypertension Father     Breast cancer Neg Hx     Cancer Neg Hx     Colon cancer Neg Hx     Ovarian cancer Neg Hx        Social History:   Social History     Tobacco Use    Smoking status: Never    Smokeless tobacco: Never   Substance Use Topics    Alcohol use: Not Currently    Drug use: Never       Allergies:   Review of patient's allergies indicates:  No Known Allergies    Medications:   No current facility-administered medications on file prior to encounter.     Current Outpatient Medications on File Prior to Encounter   Medication Sig Dispense  Refill    levothyroxine (SYNTHROID) 137 MCG Tab tablet Take 137 mcg by mouth.      losartan (COZAAR) 50 MG tablet Take 1 tablet (50 mg total) by mouth once daily. 90 tablet 1    AUBAGIO 14 mg Tab Take 1 tablet by mouth once daily.      baclofen (LIORESAL) 20 MG tablet TAKE ONE TABLET BY MOUTH EVERY DAY 90 tablet 3    carBAMazepine (TEGRETOL) 200 mg tablet Take 200 mg by mouth daily as needed.      ferrous sulfate (FEOSOL) Tab tablet Take 1 tablet by mouth daily with breakfast.      gabapentin (NEURONTIN) 300 MG capsule Take 1 capsule (300 mg total) by mouth 3 (three) times daily. 90 capsule 2    ibuprofen (ADVIL,MOTRIN) 800 MG tablet Take 800 mg by mouth 3 (three) times daily as needed.      meclizine (ANTIVERT) 25 mg tablet Take 25 mg by mouth 3 (three) times daily as needed.      medroxyPROGESTERone (PROVERA) 10 MG tablet Take 1 tablet (10 mg total) by mouth once daily. for 10 days 10 tablet 0    multivitamin (THERAGRAN) per tablet Take 1 tablet by mouth once daily.      naproxen (NAPROSYN) 500 MG tablet Take 1 tablet (500 mg total) by mouth 2 (two) times daily with meals. For pain and inflammation 15 tablet 0       Physical Exam:  Vital Signs:   Vitals:    10/14/22 0757   BP: 126/74   Pulse: 82   Resp: 15   Temp: 97.8 °F (36.6 °C)     General Appearance: Well appearing in no acute distress  ENT: OP clear  Chest: CTA B  CV: RRR, no m/r/g  Abd: s/nt/nd/nabs  Ext: no edema    Labs:Reviewed    IMP:  Active Hospital Problems    Diagnosis  POA    *Encounter for screening colonoscopy [Z12.11]  Not Applicable      Resolved Hospital Problems   No resolved problems to display.         Plan:   I have explained the risks and benefits of colonoscopy to the patient including but not limited to bleeding, perforation, infection, and death. The patient wishes to proceed.

## 2022-10-14 NOTE — ANESTHESIA POSTPROCEDURE EVALUATION
Anesthesia Post Evaluation    Patient: Nisha Mancuso    Procedure(s) Performed: Procedure(s) (LRB):  COLONOSCOPY (N/A)    Final Anesthesia Type: general      Patient location during evaluation: PACU  Patient participation: Yes- Able to Participate  Level of consciousness: awake and alert and oriented  Post-procedure vital signs: reviewed and stable  Pain management: adequate  Airway patency: patent    PONV status at discharge: No PONV  Anesthetic complications: no      Cardiovascular status: blood pressure returned to baseline, stable and hemodynamically stable  Respiratory status: unassisted  Hydration status: euvolemic  Follow-up not needed.          Vitals Value Taken Time   /89 10/14/22 0955   Temp 36.5 °C (97.7 °F) 10/14/22 0932   Pulse 69 10/14/22 0955   Resp 16 10/14/22 0955   SpO2 100 % 10/14/22 0955         Event Time   Out of Recovery 10:02:00         Pain/Yessenia Score: Yessenia Score: 10 (10/14/2022  9:55 AM)

## 2022-10-14 NOTE — DISCHARGE SUMMARY
[facility] End[facility] 1st Fl  Discharge Note  Short Stay    Procedure(s) (LRB):  COLONOSCOPY (N/A)      OUTCOME: Patient tolerated treatment/procedure well without complication and is now ready for discharge.    DISPOSITION: Home or Self Care    FINAL DIAGNOSIS:  Encounter for screening colonoscopy    FOLLOWUP: With primary care provider    DISCHARGE INSTRUCTIONS:  No discharge procedures on file.

## 2022-10-14 NOTE — TRANSFER OF CARE
"Anesthesia Transfer of Care Note    Patient: Nisha Mancuso    Procedure(s) Performed: Procedure(s) (LRB):  COLONOSCOPY (N/A)    Patient location: PACU    Anesthesia Type: general    Transport from OR: Transported from OR on room air with adequate spontaneous ventilation    Post pain: adequate analgesia    Post assessment: no apparent anesthetic complications and tolerated procedure well    Post vital signs: stable    Level of consciousness: awake    Nausea/Vomiting: no nausea/vomiting    Complications: none    Transfer of care protocol was followed      Last vitals:   Visit Vitals  /74   Pulse 82   Temp 36.6 °C (97.8 °F)   Resp 15   Ht 5' 4" (1.626 m)   Wt 74.5 kg (164 lb 2.1 oz)   SpO2 96%   Breastfeeding No   BMI 28.17 kg/m²     "

## 2022-10-19 LAB
FINAL PATHOLOGIC DIAGNOSIS: NORMAL
GROSS: NORMAL
Lab: NORMAL

## 2022-10-26 ENCOUNTER — OFFICE VISIT (OUTPATIENT)
Dept: OBSTETRICS AND GYNECOLOGY | Facility: CLINIC | Age: 47
End: 2022-10-26
Payer: COMMERCIAL

## 2022-10-26 DIAGNOSIS — N39.3 SUI (STRESS URINARY INCONTINENCE, FEMALE): ICD-10-CM

## 2022-10-26 DIAGNOSIS — N93.8 DUB (DYSFUNCTIONAL UTERINE BLEEDING): Primary | ICD-10-CM

## 2022-10-26 PROBLEM — Z12.11 ENCOUNTER FOR SCREENING COLONOSCOPY: Status: RESOLVED | Noted: 2022-10-14 | Resolved: 2022-10-26

## 2022-10-26 PROCEDURE — 4010F ACE/ARB THERAPY RXD/TAKEN: CPT | Mod: CPTII,95,, | Performed by: OBSTETRICS & GYNECOLOGY

## 2022-10-26 PROCEDURE — 3044F HG A1C LEVEL LT 7.0%: CPT | Mod: CPTII,95,, | Performed by: OBSTETRICS & GYNECOLOGY

## 2022-10-26 PROCEDURE — 1160F RVW MEDS BY RX/DR IN RCRD: CPT | Mod: CPTII,95,, | Performed by: OBSTETRICS & GYNECOLOGY

## 2022-10-26 PROCEDURE — 1159F MED LIST DOCD IN RCRD: CPT | Mod: CPTII,95,, | Performed by: OBSTETRICS & GYNECOLOGY

## 2022-10-26 PROCEDURE — 1159F PR MEDICATION LIST DOCUMENTED IN MEDICAL RECORD: ICD-10-PCS | Mod: CPTII,95,, | Performed by: OBSTETRICS & GYNECOLOGY

## 2022-10-26 PROCEDURE — 3044F PR MOST RECENT HEMOGLOBIN A1C LEVEL <7.0%: ICD-10-PCS | Mod: CPTII,95,, | Performed by: OBSTETRICS & GYNECOLOGY

## 2022-10-26 PROCEDURE — 99213 OFFICE O/P EST LOW 20 MIN: CPT | Mod: 95,,, | Performed by: OBSTETRICS & GYNECOLOGY

## 2022-10-26 PROCEDURE — 4010F PR ACE/ARB THEARPY RXD/TAKEN: ICD-10-PCS | Mod: CPTII,95,, | Performed by: OBSTETRICS & GYNECOLOGY

## 2022-10-26 PROCEDURE — 99213 PR OFFICE/OUTPT VISIT, EST, LEVL III, 20-29 MIN: ICD-10-PCS | Mod: 95,,, | Performed by: OBSTETRICS & GYNECOLOGY

## 2022-10-26 PROCEDURE — 1160F PR REVIEW ALL MEDS BY PRESCRIBER/CLIN PHARMACIST DOCUMENTED: ICD-10-PCS | Mod: CPTII,95,, | Performed by: OBSTETRICS & GYNECOLOGY

## 2022-10-26 NOTE — PROGRESS NOTES
Subjective:       Patient ID: Nisha Mancuso is a 47 y.o. female.    Chief Complaint:  No chief complaint on file.      History of Present Illness  HPI  The patient location is: home   The chief complaint leading to consultation is: irregular uterine bleeding and loss of urine     Visit type: audiovisual    Face to Face time with patient:   20 minutes of total time spent on the encounter, which includes face to face time and non-face to face time preparing to see the patient (eg, review of tests), Obtaining and/or reviewing separately obtained history, Documenting clinical information in the electronic or other health record, Independently interpreting results (not separately reported) and communicating results to the patient/family/caregiver, or Care coordination (not separately reported).         Each patient to whom he or she provides medical services by telemedicine is:  (1) informed of the relationship between the physician and patient and the respective role of any other health care provider with respect to management of the patient; and (2) notified that he or she may decline to receive medical services by telemedicine and may withdraw from such care at any time.    Notes:     Discussed surgical options to manage DYSFUNCTIONAL UTERINE BLEEDING as patient is no longer comfortable with continuation of progestin therapy   Work up has been benign   Also with loss of urine with cough and some urge to void   With history  of MS, would like urodynamic evaluation to confirm anatomical issue and not neurogenic   Reviewed options    Robotic hysterectomy with conservation of the ovaries   Hysteroscopy with ablation   Advised that this would require laparoscopy with salpingectomy      Health Maintenance   Topic Date Due    TETANUS VACCINE  Never done    Mammogram  Never done    Lipid Panel  05/24/2027    Hepatitis C Screening  Completed     GYN & OB History  No LMP recorded.   Date of Last Pap: 5/24/2022    OB History     Para Term  AB Living   4       1 3   SAB IAB Ectopic Multiple Live Births   1              # Outcome Date GA Lbr Parminder/2nd Weight Sex Delivery Anes PTL Lv   4             3             2             1 SAB                Review of Systems  Review of Systems        Objective:   There were no vitals taken for this visit.   Physical Exam     Assessment:        1. DUB (dysfunctional uterine bleeding)    2. DRE (stress urinary incontinence, female)                Plan:      Would like to proceed with robotic hysterectomy salpingectomy in near future   Possible Lynne procedure pending  consultation and advice       Diagnoses and all orders for this visit:    DUB (dysfunctional uterine bleeding)    DRE (stress urinary incontinence, female)  -     Ambulatory referral/consult to Urology; Future    Patient will message us in the future with choice

## 2022-11-22 ENCOUNTER — PATIENT MESSAGE (OUTPATIENT)
Dept: UROLOGY | Facility: CLINIC | Age: 47
End: 2022-11-22
Payer: COMMERCIAL

## 2022-11-28 ENCOUNTER — PATIENT MESSAGE (OUTPATIENT)
Dept: OBSTETRICS AND GYNECOLOGY | Facility: CLINIC | Age: 47
End: 2022-11-28
Payer: COMMERCIAL

## 2022-11-28 DIAGNOSIS — N39.3 SUI (STRESS URINARY INCONTINENCE, FEMALE): ICD-10-CM

## 2022-11-28 DIAGNOSIS — N93.8 DUB (DYSFUNCTIONAL UTERINE BLEEDING): Primary | ICD-10-CM

## 2022-12-01 ENCOUNTER — PATIENT MESSAGE (OUTPATIENT)
Dept: PSYCHIATRY | Facility: CLINIC | Age: 47
End: 2022-12-01
Payer: COMMERCIAL

## 2022-12-06 ENCOUNTER — PATIENT MESSAGE (OUTPATIENT)
Dept: UROLOGY | Facility: CLINIC | Age: 47
End: 2022-12-06
Payer: COMMERCIAL

## 2022-12-06 ENCOUNTER — PATIENT MESSAGE (OUTPATIENT)
Dept: OBSTETRICS AND GYNECOLOGY | Facility: CLINIC | Age: 47
End: 2022-12-06
Payer: COMMERCIAL

## 2022-12-06 DIAGNOSIS — N93.8 DUB (DYSFUNCTIONAL UTERINE BLEEDING): ICD-10-CM

## 2022-12-06 RX ORDER — MEDROXYPROGESTERONE ACETATE 10 MG/1
10 TABLET ORAL DAILY
Qty: 10 TABLET | Refills: 0 | Status: SHIPPED | OUTPATIENT
Start: 2022-12-06 | End: 2023-01-18

## 2022-12-13 ENCOUNTER — PATIENT MESSAGE (OUTPATIENT)
Dept: OBSTETRICS AND GYNECOLOGY | Facility: CLINIC | Age: 47
End: 2022-12-13
Payer: COMMERCIAL

## 2022-12-20 ENCOUNTER — OFFICE VISIT (OUTPATIENT)
Dept: UROLOGY | Facility: CLINIC | Age: 47
End: 2022-12-20
Payer: COMMERCIAL

## 2022-12-20 ENCOUNTER — PATIENT MESSAGE (OUTPATIENT)
Dept: UROLOGY | Facility: CLINIC | Age: 47
End: 2022-12-20

## 2022-12-20 VITALS
RESPIRATION RATE: 18 BRPM | BODY MASS INDEX: 29.1 KG/M2 | WEIGHT: 169.56 LBS | DIASTOLIC BLOOD PRESSURE: 79 MMHG | SYSTOLIC BLOOD PRESSURE: 136 MMHG | HEART RATE: 88 BPM

## 2022-12-20 DIAGNOSIS — G35 MS (MULTIPLE SCLEROSIS): Chronic | ICD-10-CM

## 2022-12-20 DIAGNOSIS — N39.3 SUI (STRESS URINARY INCONTINENCE, FEMALE): Primary | ICD-10-CM

## 2022-12-20 PROCEDURE — 3075F SYST BP GE 130 - 139MM HG: CPT | Mod: CPTII,S$GLB,, | Performed by: NURSE PRACTITIONER

## 2022-12-20 PROCEDURE — 3078F PR MOST RECENT DIASTOLIC BLOOD PRESSURE < 80 MM HG: ICD-10-PCS | Mod: CPTII,S$GLB,, | Performed by: NURSE PRACTITIONER

## 2022-12-20 PROCEDURE — 4010F PR ACE/ARB THEARPY RXD/TAKEN: ICD-10-PCS | Mod: CPTII,S$GLB,, | Performed by: NURSE PRACTITIONER

## 2022-12-20 PROCEDURE — 3075F PR MOST RECENT SYSTOLIC BLOOD PRESS GE 130-139MM HG: ICD-10-PCS | Mod: CPTII,S$GLB,, | Performed by: NURSE PRACTITIONER

## 2022-12-20 PROCEDURE — 99999 PR PBB SHADOW E&M-EST. PATIENT-LVL III: CPT | Mod: PBBFAC,,, | Performed by: NURSE PRACTITIONER

## 2022-12-20 PROCEDURE — 51798 POCT BLADDER SCAN: ICD-10-PCS | Mod: S$GLB,,, | Performed by: NURSE PRACTITIONER

## 2022-12-20 PROCEDURE — 4010F ACE/ARB THERAPY RXD/TAKEN: CPT | Mod: CPTII,S$GLB,, | Performed by: NURSE PRACTITIONER

## 2022-12-20 PROCEDURE — 99204 PR OFFICE/OUTPT VISIT, NEW, LEVL IV, 45-59 MIN: ICD-10-PCS | Mod: S$GLB,,, | Performed by: NURSE PRACTITIONER

## 2022-12-20 PROCEDURE — 99204 OFFICE O/P NEW MOD 45 MIN: CPT | Mod: S$GLB,,, | Performed by: NURSE PRACTITIONER

## 2022-12-20 PROCEDURE — 3008F PR BODY MASS INDEX (BMI) DOCUMENTED: ICD-10-PCS | Mod: CPTII,S$GLB,, | Performed by: NURSE PRACTITIONER

## 2022-12-20 PROCEDURE — 3008F BODY MASS INDEX DOCD: CPT | Mod: CPTII,S$GLB,, | Performed by: NURSE PRACTITIONER

## 2022-12-20 PROCEDURE — 1159F PR MEDICATION LIST DOCUMENTED IN MEDICAL RECORD: ICD-10-PCS | Mod: CPTII,S$GLB,, | Performed by: NURSE PRACTITIONER

## 2022-12-20 PROCEDURE — 3044F HG A1C LEVEL LT 7.0%: CPT | Mod: CPTII,S$GLB,, | Performed by: NURSE PRACTITIONER

## 2022-12-20 PROCEDURE — 51798 US URINE CAPACITY MEASURE: CPT | Mod: S$GLB,,, | Performed by: NURSE PRACTITIONER

## 2022-12-20 PROCEDURE — 99999 PR PBB SHADOW E&M-EST. PATIENT-LVL III: ICD-10-PCS | Mod: PBBFAC,,, | Performed by: NURSE PRACTITIONER

## 2022-12-20 PROCEDURE — 3078F DIAST BP <80 MM HG: CPT | Mod: CPTII,S$GLB,, | Performed by: NURSE PRACTITIONER

## 2022-12-20 PROCEDURE — 1159F MED LIST DOCD IN RCRD: CPT | Mod: CPTII,S$GLB,, | Performed by: NURSE PRACTITIONER

## 2022-12-20 PROCEDURE — 3044F PR MOST RECENT HEMOGLOBIN A1C LEVEL <7.0%: ICD-10-PCS | Mod: CPTII,S$GLB,, | Performed by: NURSE PRACTITIONER

## 2022-12-20 NOTE — PROGRESS NOTES
Chief Complaint:   Mixed incontinence, stress greater than urge    HPI:   Patient is a delightful 47-year-old female that is presenting with complaints of mixed incontinence, stress greater than urge.  Patient states that nocturia is only once nightly and does not have urinary frequency.  Is not wearing a pad.  Patient does state that she has mixed incontinence, stress greater than urge that has increased over the last 6 months.  Urine in clinic is negative in PVR was 39 mL.No abd/pelvic pain and no exac/rel factors.  No hematuria.  No urolithiasis.  Patient is scheduled for hysterectomy, February of next year, gyn MD is requesting urodynamic studies secondary to increase in stress incontinence and patient has history of muscular sclerosis ( MS).     Allergies:  Patient has no known allergies.    Medications:  has a current medication list which includes the following prescription(s): aubagio, baclofen, carbamazepine, ferrous sulfate, gabapentin, ibuprofen, levothyroxine, losartan, meclizine, medroxyprogesterone, multivitamin, and naproxen.    Review of Systems:  General: No fever, chills, fatigability, or weight loss.  Skin: No rashes, itching, or changes in color or texture of skin.  Chest: Denies ARECHIGA, cyanosis, wheezing, cough, and sputum production.  Abdomen: Appetite fine. No weight loss. Denies diarrhea, abdominal pain, hematemesis, or blood in stool.  Musculoskeletal: No joint stiffness or swelling. Denies back pain.  : As above.  All other review of systems negative.    PMH:   has a past medical history of Multiple sclerosis, Thyroid cancer, and Thyroid cancer (2011).    PSH:   has a past surgical history that includes Thyroidectomy and Colonoscopy (N/A, 10/14/2022).    FamHx: family history includes Arthritis in her mother; Hypertension in her father.    SocHx:  reports that she has never smoked. She has never used smokeless tobacco. She reports that she does not currently use alcohol. She reports that  she does not use drugs.      Physical Exam:  Vitals:    12/20/22 1355   BP: 136/79   Pulse: 88   Resp: 18     General: A&Ox3, no apparent distress, no deformities  Neck: No masses, normal thyroid  Lungs: normal inspiration, no use of accessory muscles  Heart: normal pulse, no arrhythmias  Abdomen: Soft, NT, ND, no masses, no hernias, no hepatosplenomegaly  Lymphatic: Neck and groin nodes negative    Labs/Studies:   See HPI    Impression/Plan:   Mixed incontinence  Will proceed with urodynamic studies and schedule an appointment with MD to review results.  Patient does have mixed incontinence, however, also has multiple sclerosis.  Urodynamic studies will assist us with further evaluation to assess overactive bladder symptoms that could be exacerbated by MS.

## 2022-12-21 ENCOUNTER — PATIENT MESSAGE (OUTPATIENT)
Dept: OBSTETRICS AND GYNECOLOGY | Facility: CLINIC | Age: 47
End: 2022-12-21
Payer: COMMERCIAL

## 2022-12-21 LAB — POC RESIDUAL URINE VOLUME: 39 ML (ref 0–100)

## 2023-01-09 ENCOUNTER — TELEPHONE (OUTPATIENT)
Dept: UROLOGY | Facility: CLINIC | Age: 48
End: 2023-01-09

## 2023-01-09 NOTE — TELEPHONE ENCOUNTER
----- Message from Pauly Agosto NP sent at 12/21/2022  9:49 AM CST -----  Wanted to make sure that you knew this patient needs urodynamic studies, as soon as possible.  Gyn physician requesting urodynamic studies prior to hysterectomy.

## 2023-01-17 ENCOUNTER — PATIENT MESSAGE (OUTPATIENT)
Dept: UROLOGY | Facility: CLINIC | Age: 48
End: 2023-01-17
Payer: COMMERCIAL

## 2023-01-18 ENCOUNTER — OFFICE VISIT (OUTPATIENT)
Dept: OBSTETRICS AND GYNECOLOGY | Facility: CLINIC | Age: 48
End: 2023-01-18
Payer: COMMERCIAL

## 2023-01-18 VITALS
BODY MASS INDEX: 29.28 KG/M2 | HEIGHT: 64 IN | WEIGHT: 171.5 LBS | DIASTOLIC BLOOD PRESSURE: 72 MMHG | SYSTOLIC BLOOD PRESSURE: 124 MMHG

## 2023-01-18 DIAGNOSIS — N93.8 DUB (DYSFUNCTIONAL UTERINE BLEEDING): Primary | ICD-10-CM

## 2023-01-18 DIAGNOSIS — N83.202 LEFT OVARIAN CYST: ICD-10-CM

## 2023-01-18 DIAGNOSIS — N39.3 SUI (STRESS URINARY INCONTINENCE, FEMALE): ICD-10-CM

## 2023-01-18 PROCEDURE — 3074F SYST BP LT 130 MM HG: CPT | Mod: CPTII,S$GLB,, | Performed by: OBSTETRICS & GYNECOLOGY

## 2023-01-18 PROCEDURE — 1159F PR MEDICATION LIST DOCUMENTED IN MEDICAL RECORD: ICD-10-PCS | Mod: CPTII,S$GLB,, | Performed by: OBSTETRICS & GYNECOLOGY

## 2023-01-18 PROCEDURE — 1160F RVW MEDS BY RX/DR IN RCRD: CPT | Mod: CPTII,S$GLB,, | Performed by: OBSTETRICS & GYNECOLOGY

## 2023-01-18 PROCEDURE — 3074F PR MOST RECENT SYSTOLIC BLOOD PRESSURE < 130 MM HG: ICD-10-PCS | Mod: CPTII,S$GLB,, | Performed by: OBSTETRICS & GYNECOLOGY

## 2023-01-18 PROCEDURE — 3078F PR MOST RECENT DIASTOLIC BLOOD PRESSURE < 80 MM HG: ICD-10-PCS | Mod: CPTII,S$GLB,, | Performed by: OBSTETRICS & GYNECOLOGY

## 2023-01-18 PROCEDURE — 99999 PR PBB SHADOW E&M-EST. PATIENT-LVL III: CPT | Mod: PBBFAC,,, | Performed by: OBSTETRICS & GYNECOLOGY

## 2023-01-18 PROCEDURE — 99999 PR PBB SHADOW E&M-EST. PATIENT-LVL III: ICD-10-PCS | Mod: PBBFAC,,, | Performed by: OBSTETRICS & GYNECOLOGY

## 2023-01-18 PROCEDURE — 3008F BODY MASS INDEX DOCD: CPT | Mod: CPTII,S$GLB,, | Performed by: OBSTETRICS & GYNECOLOGY

## 2023-01-18 PROCEDURE — 1159F MED LIST DOCD IN RCRD: CPT | Mod: CPTII,S$GLB,, | Performed by: OBSTETRICS & GYNECOLOGY

## 2023-01-18 PROCEDURE — 3078F DIAST BP <80 MM HG: CPT | Mod: CPTII,S$GLB,, | Performed by: OBSTETRICS & GYNECOLOGY

## 2023-01-18 PROCEDURE — 3008F PR BODY MASS INDEX (BMI) DOCUMENTED: ICD-10-PCS | Mod: CPTII,S$GLB,, | Performed by: OBSTETRICS & GYNECOLOGY

## 2023-01-18 PROCEDURE — 1160F PR REVIEW ALL MEDS BY PRESCRIBER/CLIN PHARMACIST DOCUMENTED: ICD-10-PCS | Mod: CPTII,S$GLB,, | Performed by: OBSTETRICS & GYNECOLOGY

## 2023-01-18 PROCEDURE — 99499 UNLISTED E&M SERVICE: CPT | Mod: S$GLB,,, | Performed by: OBSTETRICS & GYNECOLOGY

## 2023-01-18 PROCEDURE — 99499 NO LOS: ICD-10-PCS | Mod: S$GLB,,, | Performed by: OBSTETRICS & GYNECOLOGY

## 2023-01-18 RX ORDER — HYDROCODONE BITARTRATE AND ACETAMINOPHEN 10; 325 MG/1; MG/1
1 TABLET ORAL EVERY 4 HOURS PRN
Status: CANCELLED | OUTPATIENT
Start: 2023-01-18

## 2023-01-18 RX ORDER — ONDANSETRON 2 MG/ML
4 INJECTION INTRAMUSCULAR; INTRAVENOUS DAILY PRN
Status: CANCELLED | OUTPATIENT
Start: 2023-01-18

## 2023-01-18 RX ORDER — ONDANSETRON 4 MG/1
8 TABLET, ORALLY DISINTEGRATING ORAL EVERY 8 HOURS PRN
Status: CANCELLED | OUTPATIENT
Start: 2023-01-18

## 2023-01-18 RX ORDER — PROCHLORPERAZINE EDISYLATE 5 MG/ML
5 INJECTION INTRAMUSCULAR; INTRAVENOUS EVERY 10 MIN PRN
Status: CANCELLED | OUTPATIENT
Start: 2023-01-18

## 2023-01-18 RX ORDER — SODIUM CHLORIDE, SODIUM LACTATE, POTASSIUM CHLORIDE, CALCIUM CHLORIDE 600; 310; 30; 20 MG/100ML; MG/100ML; MG/100ML; MG/100ML
INJECTION, SOLUTION INTRAVENOUS CONTINUOUS
Status: CANCELLED | OUTPATIENT
Start: 2023-01-18

## 2023-01-18 RX ORDER — MUPIROCIN 20 MG/G
OINTMENT TOPICAL
Status: CANCELLED | OUTPATIENT
Start: 2023-01-18

## 2023-01-18 RX ORDER — HYDROMORPHONE HYDROCHLORIDE 2 MG/ML
0.2 INJECTION, SOLUTION INTRAMUSCULAR; INTRAVENOUS; SUBCUTANEOUS EVERY 5 MIN PRN
Status: CANCELLED | OUTPATIENT
Start: 2023-01-18

## 2023-01-18 RX ORDER — FAMOTIDINE 20 MG/1
20 TABLET, FILM COATED ORAL
Status: CANCELLED | OUTPATIENT
Start: 2023-01-18

## 2023-01-18 RX ORDER — MEDROXYPROGESTERONE ACETATE 10 MG/1
10 TABLET ORAL DAILY
Qty: 10 TABLET | Refills: 21 | Status: SHIPPED | OUTPATIENT
Start: 2023-01-18 | End: 2024-02-22 | Stop reason: ALTCHOICE

## 2023-01-18 RX ORDER — ACETAMINOPHEN 500 MG
1000 TABLET ORAL
Status: CANCELLED | OUTPATIENT
Start: 2023-01-18

## 2023-01-18 RX ORDER — ACETAMINOPHEN 325 MG/1
650 TABLET ORAL EVERY 4 HOURS PRN
Status: CANCELLED | OUTPATIENT
Start: 2023-01-18

## 2023-01-18 RX ORDER — DIPHENHYDRAMINE HYDROCHLORIDE 50 MG/ML
25 INJECTION INTRAMUSCULAR; INTRAVENOUS EVERY 4 HOURS PRN
Status: CANCELLED | OUTPATIENT
Start: 2023-01-18

## 2023-01-18 RX ORDER — POLYETHYLENE GLYCOL 3350 17 G/17G
17 POWDER, FOR SOLUTION ORAL DAILY
Status: CANCELLED | OUTPATIENT
Start: 2023-01-19

## 2023-01-18 RX ORDER — SODIUM CHLORIDE 0.9 % (FLUSH) 0.9 %
3 SYRINGE (ML) INJECTION
Status: CANCELLED | OUTPATIENT
Start: 2023-01-18

## 2023-01-18 RX ORDER — HYDROCODONE BITARTRATE AND ACETAMINOPHEN 5; 325 MG/1; MG/1
1 TABLET ORAL EVERY 4 HOURS PRN
Status: CANCELLED | OUTPATIENT
Start: 2023-01-18

## 2023-01-18 RX ORDER — DIPHENHYDRAMINE HCL 25 MG
25 CAPSULE ORAL EVERY 4 HOURS PRN
Status: CANCELLED | OUTPATIENT
Start: 2023-01-18

## 2023-01-18 RX ORDER — IBUPROFEN 200 MG
800 TABLET ORAL
Status: CANCELLED | OUTPATIENT
Start: 2023-01-19

## 2023-01-18 RX ORDER — PROCHLORPERAZINE EDISYLATE 5 MG/ML
5 INJECTION INTRAMUSCULAR; INTRAVENOUS EVERY 6 HOURS PRN
Status: CANCELLED | OUTPATIENT
Start: 2023-01-18

## 2023-01-18 RX ORDER — LIDOCAINE HYDROCHLORIDE 10 MG/ML
0.5 INJECTION, SOLUTION EPIDURAL; INFILTRATION; INTRACAUDAL; PERINEURAL
Status: CANCELLED | OUTPATIENT
Start: 2023-01-18

## 2023-01-18 RX ORDER — KETOROLAC TROMETHAMINE 30 MG/ML
30 INJECTION, SOLUTION INTRAMUSCULAR; INTRAVENOUS
Status: CANCELLED | OUTPATIENT
Start: 2023-01-18 | End: 2023-01-19

## 2023-01-18 RX ORDER — AMOXICILLIN 250 MG
1 CAPSULE ORAL 2 TIMES DAILY
Status: CANCELLED | OUTPATIENT
Start: 2023-01-18

## 2023-01-18 RX ORDER — HYDROMORPHONE HYDROCHLORIDE 2 MG/ML
1 INJECTION, SOLUTION INTRAMUSCULAR; INTRAVENOUS; SUBCUTANEOUS EVERY 4 HOURS PRN
Status: CANCELLED | OUTPATIENT
Start: 2023-01-18

## 2023-01-18 RX ORDER — MEPERIDINE HYDROCHLORIDE 100 MG/ML
12.5 INJECTION INTRAMUSCULAR; INTRAVENOUS; SUBCUTANEOUS ONCE AS NEEDED
Status: CANCELLED | OUTPATIENT
Start: 2023-01-18 | End: 2023-01-19

## 2023-01-18 NOTE — PROGRESS NOTES
I have explained the risks, benefits , and alternatives of laparoscopic hysterectomy with salpingectomy possible ovarian removal and possible Lynne pending Urodynamic studies in detail.  The patient voices understanding and all questions have been answered.  The patient agrees to proceed as planned.  Consent forms for the procedure have been reviewed and signed by the patient.

## 2023-01-18 NOTE — H&P
Subjective:       Patient ID: Nisha Mancuso is a 47 y.o. female.    Chief Complaint:  Pre-op Exam      History of Present Illness  HPI  DYSFUNCTIONAL UTERINE BLEEDING with loss of control with progestin therapy, benign EmBx and normal uterine ultrasound   Having loss of urine with cough and some urge.   Pending urodynamic study pre op to determine need for Lynne     GYN & OB History  No LMP recorded. Patient is perimenopausal.   Date of Last Pap: 2022    OB History    Para Term  AB Living   4       1 3   SAB IAB Ectopic Multiple Live Births   1              # Outcome Date GA Lbr Parminder/2nd Weight Sex Delivery Anes PTL Lv   4             3             2             1 SAB              Past Medical History:   Diagnosis Date    Multiple sclerosis     Thyroid cancer     Thyroid cancer      Past Surgical History:   Procedure Laterality Date    COLONOSCOPY N/A 10/14/2022    Procedure: COLONOSCOPY;  Surgeon: Geetha Alonso MD;  Location: [facility];  Service: Endoscopy;  Laterality: N/A;    THYROIDECTOMY       Family History   Problem Relation Age of Onset    Arthritis Mother     Hypertension Father     Breast cancer Neg Hx     Cancer Neg Hx     Colon cancer Neg Hx     Ovarian cancer Neg Hx      Social History     Tobacco Use    Smoking status: Never    Smokeless tobacco: Never   Substance Use Topics    Alcohol use: Not Currently    Drug use: Never     (Not in a hospital admission)    Review of patient's allergies indicates:  No Known Allergies  Current Outpatient Medications   Medication Sig    AUBAGIO 14 mg Tab Take 1 tablet by mouth once daily.    baclofen (LIORESAL) 20 MG tablet TAKE ONE TABLET BY MOUTH EVERY DAY    carBAMazepine (TEGRETOL) 200 mg tablet Take 200 mg by mouth daily as needed.    ferrous sulfate (FEOSOL) Tab tablet Take 1 tablet by mouth daily with breakfast.    gabapentin (NEURONTIN) 300 MG capsule Take 1 capsule (300 mg total) by mouth 3 (three) times daily.     ibuprofen (ADVIL,MOTRIN) 800 MG tablet Take 800 mg by mouth 3 (three) times daily as needed.    levothyroxine (SYNTHROID) 137 MCG Tab tablet Take 137 mcg by mouth.    losartan (COZAAR) 50 MG tablet Take 1 tablet (50 mg total) by mouth once daily.    meclizine (ANTIVERT) 25 mg tablet Take 25 mg by mouth 3 (three) times daily as needed.    multivitamin (THERAGRAN) per tablet Take 1 tablet by mouth once daily.    naproxen (NAPROSYN) 500 MG tablet Take 1 tablet (500 mg total) by mouth 2 (two) times daily with meals. For pain and inflammation    medroxyPROGESTERone (PROVERA) 10 MG tablet Take 1 tablet (10 mg total) by mouth once daily. for 10 days     No current facility-administered medications for this visit.        Review of Systems  Review of Systems   Constitutional:  Negative for appetite change, chills, fatigue, fever and unexpected weight change.   HENT: Negative.     Eyes:  Negative for visual disturbance.   Respiratory:  Negative for shortness of breath and wheezing.    Cardiovascular:  Negative for chest pain, palpitations and leg swelling.   Gastrointestinal:  Negative for abdominal pain, bloating, blood in stool, constipation, diarrhea, nausea and vomiting.   Endocrine: Negative for hair loss, hot flashes and hypothyroidism.   Genitourinary:  Positive for menstrual problem, urgency and urinary incontinence. Negative for dysmenorrhea, dyspareunia, dysuria, flank pain, frequency, genital sores, hematuria, menorrhagia, pelvic pain, vaginal bleeding, vaginal discharge and vaginal odor.   Musculoskeletal:  Negative for back pain, joint swelling and myalgias.   Integumentary:  Negative for rash, hair changes, breast mass, nipple discharge and breast skin changes.   Neurological:  Negative for syncope and headaches.   Hematological:  Negative for adenopathy. Does not bruise/bleed easily.   Psychiatric/Behavioral:  Negative for depression and sleep disturbance. The patient is not nervous/anxious.    Breast:  Negative for mass, mastodynia, nipple discharge and skin changes        Objective:    Physical Exam:   Constitutional: She is oriented to person, place, and time. Vital signs are normal. She appears well-developed and well-nourished. No distress.    HENT:   Head: Normocephalic and atraumatic.   Right Ear: External ear normal.   Left Ear: External ear normal.   Nose: Nose normal.    Eyes: Pupils are equal, round, and reactive to light. Conjunctivae are normal.    Neck: Trachea normal. No JVD present. No thyroid mass and no thyromegaly present.    Cardiovascular:  Normal rate and regular rhythm.             Pulmonary/Chest: Effort normal and breath sounds normal. No respiratory distress.        Abdominal: Soft. Bowel sounds are normal. She exhibits no distension and no mass. There is no abdominal tenderness. No hernia. Hernia confirmed negative in the ventral area, confirmed negative in the right inguinal area and confirmed negative in the left inguinal area.     Genitourinary:    Vagina, uterus and rectum normal.   Rectum:      No external hemorrhoid or abnormal anal tone.   Labial bartholins normal.There is no rash, tenderness or lesion on the right labia. There is no rash, tenderness or lesion on the left labia. Cervix is normal. Right adnexum displays no mass, no tenderness and no fullness. Left adnexum displays no mass, no tenderness and no fullness. No  no vaginal discharge, tenderness, bleeding, rectocele, cystocele or unspecified prolapse of vaginal walls in the vagina.    No foreign body in the vagina.   Cervix exhibits no motion tenderness, no discharge and no friability. Uterus is not deviated, not enlarged and not tender.           Musculoskeletal: Normal range of motion.       Neurological: She is alert and oriented to person, place, and time. She has normal reflexes.    Skin: Skin is warm and dry. She is not diaphoretic.    Psychiatric: She has a normal mood and affect. Her speech is normal and behavior  is normal. Thought content normal.        Assessment:        1. DUB (dysfunctional uterine bleeding)    2. DRE (stress urinary incontinence, female)    3. Left ovarian cyst                Plan:      Nisha was seen today for pre-op exam.    Diagnoses and all orders for this visit:    DUB (dysfunctional uterine bleeding)  Comments:  Robotic Hysterectomy salpingectomy possible oophorectomy   Orders:  -     medroxyPROGESTERone (PROVERA) 10 MG tablet; Take 1 tablet (10 mg total) by mouth once daily. for 10 days    DRE (stress urinary incontinence, female)  Comments:  Lynne procedure pending Urodynamic study     Left ovarian cyst

## 2023-01-19 ENCOUNTER — PATIENT MESSAGE (OUTPATIENT)
Dept: SURGERY | Facility: HOSPITAL | Age: 48
End: 2023-01-19
Payer: COMMERCIAL

## 2023-01-23 ENCOUNTER — TELEPHONE (OUTPATIENT)
Dept: UROLOGY | Facility: CLINIC | Age: 48
End: 2023-01-23
Payer: COMMERCIAL

## 2023-01-23 NOTE — TELEPHONE ENCOUNTER
Sarah spoke to pt. The appt was rescheduled       ----- Message from Shabana Hernandez sent at 1/23/2023  8:38 AM CST -----  Contact: jan  Patient is calling to speak with the nurse regarding procedure. Reports not knowing anything about the procedure and wants to reschedule for another day. Please give the patient a call back at .[phone]  Thanks aRosalinoaRosalino

## 2023-01-25 ENCOUNTER — TELEPHONE (OUTPATIENT)
Dept: UROLOGY | Facility: CLINIC | Age: 48
End: 2023-01-25
Payer: COMMERCIAL

## 2023-01-25 ENCOUNTER — PATIENT MESSAGE (OUTPATIENT)
Dept: ADMINISTRATIVE | Facility: HOSPITAL | Age: 48
End: 2023-01-25
Payer: COMMERCIAL

## 2023-01-25 NOTE — TELEPHONE ENCOUNTER
Returned pt's call there was no answer       ----- Message from Atul Orozco sent at 1/24/2023  3:39 PM CST -----  Contact: [phone]  Type:  Patient Returning Call    Who Called:Nisha   Who Left Message for Patient:nurse   Does the patient know what this is regarding?:call back   Would the patient rather a call back or a response via [employer]? Call back   Best Call Back Number:[phone]  Additional Information:

## 2023-02-03 ENCOUNTER — HOSPITAL ENCOUNTER (OUTPATIENT)
Dept: PREADMISSION TESTING | Facility: HOSPITAL | Age: 48
Discharge: HOME | End: 2023-02-03
Attending: OBSTETRICS & GYNECOLOGY
Payer: COMMERCIAL

## 2023-02-03 VITALS
TEMPERATURE: 98 F | DIASTOLIC BLOOD PRESSURE: 74 MMHG | RESPIRATION RATE: 16 BRPM | HEART RATE: 90 BPM | SYSTOLIC BLOOD PRESSURE: 157 MMHG | OXYGEN SATURATION: 98 %

## 2023-02-03 DIAGNOSIS — N93.8 DUB (DYSFUNCTIONAL UTERINE BLEEDING): ICD-10-CM

## 2023-02-03 DIAGNOSIS — Z01.818 PREOP EXAMINATION: Primary | ICD-10-CM

## 2023-02-03 DIAGNOSIS — G35 MS (MULTIPLE SCLEROSIS): Chronic | ICD-10-CM

## 2023-02-03 DIAGNOSIS — I10 HYPERTENSION, UNSPECIFIED TYPE: ICD-10-CM

## 2023-02-03 DIAGNOSIS — E89.0 POSTOPERATIVE HYPOTHYROIDISM: ICD-10-CM

## 2023-02-03 DIAGNOSIS — N39.3 SUI (STRESS URINARY INCONTINENCE, FEMALE): ICD-10-CM

## 2023-02-03 LAB
ALBUMIN SERPL BCP-MCNC: 4 G/DL (ref 3.5–5.2)
ALP SERPL-CCNC: 63 U/L (ref 55–135)
ALT SERPL W/O P-5'-P-CCNC: 19 U/L (ref 10–44)
ANION GAP SERPL CALC-SCNC: 11 MMOL/L (ref 8–16)
AST SERPL-CCNC: 18 U/L (ref 10–40)
BASOPHILS # BLD AUTO: 0.04 K/UL (ref 0–0.2)
BASOPHILS NFR BLD: 0.6 % (ref 0–1.9)
BILIRUB SERPL-MCNC: 0.3 MG/DL (ref 0.1–1)
BUN SERPL-MCNC: 16 MG/DL (ref 6–20)
CALCIUM SERPL-MCNC: 9.3 MG/DL (ref 8.7–10.5)
CHLORIDE SERPL-SCNC: 108 MMOL/L (ref 95–110)
CO2 SERPL-SCNC: 22 MMOL/L (ref 23–29)
CREAT SERPL-MCNC: 0.8 MG/DL (ref 0.5–1.4)
DIFFERENTIAL METHOD: ABNORMAL
EOSINOPHIL # BLD AUTO: 0.3 K/UL (ref 0–0.5)
EOSINOPHIL NFR BLD: 5 % (ref 0–8)
ERYTHROCYTE [DISTWIDTH] IN BLOOD BY AUTOMATED COUNT: 14 % (ref 11.5–14.5)
EST. GFR  (NO RACE VARIABLE): >60 ML/MIN/1.73 M^2
GLUCOSE SERPL-MCNC: 100 MG/DL (ref 70–110)
HCT VFR BLD AUTO: 32.2 % (ref 37–48.5)
HGB BLD-MCNC: 10.2 G/DL (ref 12–16)
IMM GRANULOCYTES # BLD AUTO: 0.01 K/UL (ref 0–0.04)
IMM GRANULOCYTES NFR BLD AUTO: 0.2 % (ref 0–0.5)
LYMPHOCYTES # BLD AUTO: 1.7 K/UL (ref 1–4.8)
LYMPHOCYTES NFR BLD: 25.7 % (ref 18–48)
MCH RBC QN AUTO: 27.4 PG (ref 27–31)
MCHC RBC AUTO-ENTMCNC: 31.7 G/DL (ref 32–36)
MCV RBC AUTO: 87 FL (ref 82–98)
MONOCYTES # BLD AUTO: 0.5 K/UL (ref 0.3–1)
MONOCYTES NFR BLD: 8.1 % (ref 4–15)
NEUTROPHILS # BLD AUTO: 4 K/UL (ref 1.8–7.7)
NEUTROPHILS NFR BLD: 60.4 % (ref 38–73)
NRBC BLD-RTO: 0 /100 WBC
PLATELET # BLD AUTO: 478 K/UL (ref 150–450)
PMV BLD AUTO: 9.2 FL (ref 9.2–12.9)
POTASSIUM SERPL-SCNC: 4.2 MMOL/L (ref 3.5–5.1)
PROT SERPL-MCNC: 8.2 G/DL (ref 6–8.4)
RBC # BLD AUTO: 3.72 M/UL (ref 4–5.4)
SODIUM SERPL-SCNC: 141 MMOL/L (ref 136–145)
WBC # BLD AUTO: 6.66 K/UL (ref 3.9–12.7)

## 2023-02-03 PROCEDURE — 85025 COMPLETE CBC W/AUTO DIFF WBC: CPT | Performed by: NURSE PRACTITIONER

## 2023-02-03 PROCEDURE — 80053 COMPREHEN METABOLIC PANEL: CPT | Performed by: NURSE PRACTITIONER

## 2023-02-03 NOTE — H&P (VIEW-ONLY)
Preoperative History and Physical  [facility]                                                                   Chief Complaint: Preoperative evaluation     History of Present Illness:      Nisha Mancuso is a 47 y.o. female with a PMHx of HTN, MS, Thyroid cancer s/p thyroidectomy, and Abnormal uterine bleeding who presents to the office today for a preoperative consultation at the request of Dr. Rodarte who plans on performing a Robotic Hysterectomy on February 10.     Functional Status:      The patient is able to climb a flight of stairs. The patient is able to ambulate without difficulty. The patient's functional status is not affected by the surgical problem. The patient's functional status is not affected by shortness of breath, chest pain, dyspnea on exertion and fatigue.      MET score greater than 4    Past Medical History:      Past Medical History:   Diagnosis Date    Hypertension     Multiple sclerosis     Thyroid cancer     Thyroid cancer 2011        Past Surgical History:      Past Surgical History:   Procedure Laterality Date    COLONOSCOPY N/A 10/14/2022    Procedure: COLONOSCOPY;  Surgeon: Geetha Alonso MD;  Location: [facility];  Service: Endoscopy;  Laterality: N/A;    THYROIDECTOMY          Social History:      Social History     Socioeconomic History    Marital status:    Tobacco Use    Smoking status: Never    Smokeless tobacco: Never   Substance and Sexual Activity    Alcohol use: Not Currently    Drug use: Never    Sexual activity: Yes        Family History:      Family History   Problem Relation Age of Onset    Arthritis Mother     Hypertension Mother     Hypertension Father     No Known Problems Sister     No Known Problems Sister     No Known Problems Brother     Heart defect Maternal Grandfather     No Known Problems Maternal Grandmother     No Known Problems Paternal Grandfather     No Known Problems Paternal Grandmother      Breast cancer Neg Hx     Cancer Neg Hx     Colon cancer Neg Hx     Ovarian cancer Neg Hx        Allergies:      Review of patient's allergies indicates:  No Known Allergies    Medications:      Current Outpatient Medications   Medication Sig    AUBAGIO 14 mg Tab Take 1 tablet by mouth once daily.    baclofen (LIORESAL) 20 MG tablet TAKE ONE TABLET BY MOUTH EVERY DAY    carBAMazepine (TEGRETOL) 200 mg tablet Take 200 mg by mouth daily as needed.    gabapentin (NEURONTIN) 300 MG capsule Take 1 capsule (300 mg total) by mouth 3 (three) times daily.    levothyroxine (SYNTHROID) 137 MCG Tab tablet Take 137 mcg by mouth.    losartan (COZAAR) 50 MG tablet Take 1 tablet (50 mg total) by mouth once daily.    medroxyPROGESTERone (PROVERA) 10 MG tablet Take 1 tablet (10 mg total) by mouth once daily. for 10 days    multivitamin (THERAGRAN) per tablet Take 1 tablet by mouth once daily.    ferrous sulfate (FEOSOL) Tab tablet Take 1 tablet by mouth daily with breakfast.    ibuprofen (ADVIL,MOTRIN) 800 MG tablet Take 800 mg by mouth 3 (three) times daily as needed.    meclizine (ANTIVERT) 25 mg tablet Take 25 mg by mouth 3 (three) times daily as needed.    naproxen (NAPROSYN) 500 MG tablet Take 1 tablet (500 mg total) by mouth 2 (two) times daily with meals. For pain and inflammation (Patient not taking: Reported on 2/3/2023)     No current facility-administered medications for this encounter.       Vitals:      Vitals:    02/03/23 1315   BP: (!) 157/74   Pulse: 90   Resp: 16   Temp: 98.1 °F (36.7 °C)       Review of Systems:        Constitutional: Negative for fever, chills, weight loss, malaise/fatigue and diaphoresis. Positive for chronic, intermittent dizziness (r/t MS).  HENT: Negative for hearing loss, ear pain, nosebleeds, congestion, sore throat, neck pain, tinnitus and ear discharge.    Eyes: Negative for blurred vision, double vision, photophobia, pain, discharge and redness.   Respiratory: Negative for cough,  hemoptysis, sputum production, shortness of breath, wheezing and stridor.    Cardiovascular: Negative for chest pain, palpitations, orthopnea, claudication, leg swelling and PND.   Gastrointestinal: Negative for heartburn, nausea, vomiting, abdominal pain, diarrhea, constipation, blood in stool and melena.   Genitourinary: Negative for dysuria, urgency, frequency, hematuria and flank pain. Positive for menorrhagia.  Musculoskeletal: Negative for myalgias, back pain, joint pain and falls.   Skin: Negative for itching and rash.   Neurological: Negative for dizziness, tingling, tremors, sensory change, speech change, focal weakness, seizures, loss of consciousness, weakness and headaches.   Endo/Heme/Allergies: Negative for environmental allergies and polydipsia. Does not bruise/bleed easily.   Psychiatric/Behavioral: Negative for depression, suicidal ideas, hallucinations, memory loss and substance abuse. The patient is not nervous/anxious and does not have insomnia.    All 14 systems reviewed and negative except as noted above.    Physical Exam:      Constitutional: Appears well-developed, well-nourished and in no acute distress.  Patient is oriented to person, place, and time.   Head: Normocephalic and atraumatic. Mucous membranes moist.  Neck: Neck supple no mass.   Cardiovascular: Normal rate and regular rhythm.  S1 S2 appreciated by ascultation.  Pulmonary/Chest: Effort normal and clear to auscultation bilaterally. No respiratory distress.   Abdomen: Soft. Non-tender and non-distended. Bowel sounds are normal.   Neurological: Patient is alert and oriented to person, place and time. Moves all extremities.  Skin: Warm and dry. No lesions.  Extremities: No clubbing, cyanosis or edema.    Laboratory data:      Reviewed and noted in plan where applicable. Please see chart for full laboratory data.    No results for input(s): CPK, CPKMB, TROPONINI, MB in the last 24 hours. No results for input(s): POCTGLUCOSE in the  last 24 hours.     No results found for: INR, PROTIME    Lab Results   Component Value Date    WBC 5.44 06/18/2022    HGB 11.3 (L) 06/18/2022    HCT 36.9 (L) 06/18/2022    MCV 97 06/18/2022     06/18/2022       No results for input(s): GLU, NA, K, CL, CO2, BUN, CREATININE, CALCIUM, MG in the last 24 hours.    Predictors of intubation difficulty:       Morbid obesity? no   Anatomically abnormal facies? no   Prominent incisors? no   Receding mandible? no   Short, thick neck? no   Neck range of motion: normal   Dentition: No chipped, loose, or missing teeth.  Based on the Modified Mallampati, patient is a mallampati score: I (soft palate, uvula, fauces, and tonsillar pillars visible)    Cardiographics:      ECG:  Not indicated.    Echocardiogram: not indicated    Imaging:      Chest x-ray: not indicated    Assessment and Plan:      DUB (dysfunctional uterine bleeding)  - Patient presents today at the request of Dr. Rodarte who plans on performing a Robotic hysterectomy and salpingectomy with possible oophorectomy on 2/10.    Known risk factors for perioperative complications: None    Difficulty with intubation is not anticipated.    Cardiac Risk Estimation: Based on the Revised Cardiac Risk index, patient is a Class 1 risk with a 3.9% risk of a major cardiac event in a low risk procedure.    1.) Preoperative workup as follows: ECG, hemoglobin, hematocrit, electrolytes, creatinine, glucose, liver function studies.  2.) Change in medication regimen before surgery: discontinue ASA 6 days before surgery, discontinue NSAIDs 5 days before surgery.  3.) Prophylaxis for cardiac events with perioperative beta-blockers: not indicated.  4.) Invasive hemodynamic monitoring perioperatively: not indicated.  5.) Deep vein thrombosis prophylaxis postoperatively: intermittent pneumatic compression boots and regimen to be chosen by surgical team.  6.) Surveillance for postoperative MI with ECG immediately postoperatively and on  postoperati ve days 1 and 2 AND troponin levels 24 hours postoperatively and on day 4 or hospital discharge (whichever comes first): not indicated.  7.) Current medications which may produce withdrawal symptoms if withheld perioperatively: None.  8.) Other measures: None.    DRE (stress urinary incontinence, female)  - May undergo Lynne procedure pending Urodynamic study.       Hypertension  - BP under fair control.  - Continue Losartan for now with instructions to hold the morning of surgery to avoid hypotension associated with anesthesia, and resume postoperatively as directed.    MS (multiple sclerosis)  - Continue Aubagio.    Postoperative hypothyroidism  - Continue Synthroid.        Electronically signed by Michelle Camargo DNP, ACNP on 2/3/2023 at 1:25 PM.

## 2023-02-03 NOTE — ASSESSMENT & PLAN NOTE
- Patient presents today at the request of Dr. Rodarte who plans on performing a Robotic hysterectomy and salpingectomy with possible oophorectomy on 2/10.    Known risk factors for perioperative complications: None    Difficulty with intubation is not anticipated.    Cardiac Risk Estimation: Based on the Revised Cardiac Risk index, patient is a Class 1 risk with a 3.9% risk of a major cardiac event in a low risk procedure.    1.) Preoperative workup as follows: ECG, hemoglobin, hematocrit, electrolytes, creatinine, glucose, liver function studies.  2.) Change in medication regimen before surgery: discontinue ASA 6 days before surgery, discontinue NSAIDs 5 days before surgery.  3.) Prophylaxis for cardiac events with perioperative beta-blockers: not indicated.  4.) Invasive hemodynamic monitoring perioperatively: not indicated.  5.) Deep vein thrombosis prophylaxis postoperatively: intermittent pneumatic compression boots and regimen to be chosen by surgical team.  6.) Surveillance for postoperative MI with ECG immediately postoperatively and on postoperati ve days 1 and 2 AND troponin levels 24 hours postoperatively and on day 4 or hospital discharge (whichever comes first): not indicated.  7.) Current medications which may produce withdrawal symptoms if withheld perioperatively: None.  8.) Other measures: None.

## 2023-02-03 NOTE — PROGRESS NOTES
To confirm, your doctor has instructed you that surgery is scheduled for 2/10/2023.       Pre admit office will call the afternoon prior to surgery between 1PM and 3PM with arrival time.    Surgery will be at [facility],  The address is [address]. [place]  [address].      IMPORTANT INSTRUCTIONS!    Do not eat or drink after 12 midnight, including water.   Do not smoke or use chewing tobacco after 12 midnight  OK to brush teeth, but no gum, candy, or mints!      Take only these medicines with a small swallow of water-morning of surgery.     Synthroid, Aubagio, & Provera         ____ Stop Aspirin, Ibuprofen, Motrin and Aleve at least 5-7 days before surgery, unless otherwise instructed by your doctor, or the nurse.   You MAY use Tylenol/acetaminophen until day of surgery.      ____  If you take diabetic medication, do NOT take morning of surgery unless instructed by Doctor. Metformin must be stopped 24 hrs prior to surgery time.       ____ Stop taking any Fish Oil supplements or Vitamins at least 5 days prior to surgery, unless instructed otherwise by your Doctor.       Please notify MD office if you have an active infection, currently taking antibiotics or received a vaccination within the past 7 days.      Bathing Instructions: The night before surgery and the morning prior to coming to the hospital:    - Shower & rinse your body as usual with anti-bacterial Soap (Dial or Lever 2000)   -Hibiclens (if indicated) use AFTER anti-bacterial soap; 1 packet PM/1 packet in AM on surgical site only   -Do not use hibiclens on your head, face, or genitals.    -Do not wash with anti-bacterial soap after you use the hibiclens.    -Do not shave surgical site 5-7 days prior to surgery.    -Pubic hair 7 days prior to surgery (gyn pt's).      Pediatric patients do not need to use anti-bacterial soap or Hibiclens.             After Bathing:   __ No powder, lotions, creams, or body spray to skin     __No  deodorant for any breast procedure, PORT, or upper arm surgery     __ No makeup, mascara, nail polish or artificial nails        **SURGERY WILL BE CANCELLED IF ARTIFICIAL/NAIL POLISH IS PRESENT!!!**    __ Please remove all piercings and leave all jewelry at home.    **SURGERY WILL BE CANCELLED IF PIERCINGS ARE PRESENT!!!**      __ Dentures, Hearing Aids and Contact Lens need to be removed prior to the start of surgery.      __ Wear clean, loose-fitting clothing. Allow for dressings/bandages/surgical equipment     __ You must have transportation, and they MUST stay the entire time.       [facility] Visitor/Ride Policy:   Only 1 adult allowed (over the age of 18) to accompany you into Pre-op/Recovery Surgery Dept and must stay through the entire length of admission.     Must have a ride home from a responsible adult that you know and trust.      Pediatric Patients are allowed 2 adult visitors.     Medical Transport, Uber or Lyft can only be used if patient has a responsible adult to accompany them during ride home.         Post-Op Instructions: You will receive surgery post-op instructions by your Discharge Nurse prior to going home.     Surgical Site Infection:   Prevention of surgical site infections:   -Keep incisions clean and dry.   -Do not soak/submerge incisions in water until completely healed.   -Do not apply lotions, powders, creams, or deodorants to site.   -Always make sure hands are cleaned with antibacterial soap/ alcohol-based   prior to touching the surgical site.       Signs and symptoms:               -Redness and pain around the area where you had surgery               -Drainage of cloudy fluid from your surgical wound               -Fever over 100.4 or chills     >>>Call Surgeon office/on-call Surgeon if you experience any of these signs & symptoms post-surgery.        *Please Call [facility] Pre-Admissions Department with surgery instruction questions at [phone] or [phone].      *Insurance Questions, please call [phone] or [phone]

## 2023-02-03 NOTE — H&P
Preoperative History and Physical  [facility]                                                                   Chief Complaint: Preoperative evaluation     History of Present Illness:      Nisha Mancuso is a 47 y.o. female with a PMHx of HTN, MS, Thyroid cancer s/p thyroidectomy, and Abnormal uterine bleeding who presents to the office today for a preoperative consultation at the request of Dr. Rodarte who plans on performing a Robotic Hysterectomy on February 10.     Functional Status:      The patient is able to climb a flight of stairs. The patient is able to ambulate without difficulty. The patient's functional status is not affected by the surgical problem. The patient's functional status is not affected by shortness of breath, chest pain, dyspnea on exertion and fatigue.      MET score greater than 4    Past Medical History:      Past Medical History:   Diagnosis Date    Hypertension     Multiple sclerosis     Thyroid cancer     Thyroid cancer 2011        Past Surgical History:      Past Surgical History:   Procedure Laterality Date    COLONOSCOPY N/A 10/14/2022    Procedure: COLONOSCOPY;  Surgeon: Geetha Alonso MD;  Location: [facility];  Service: Endoscopy;  Laterality: N/A;    THYROIDECTOMY          Social History:      Social History     Socioeconomic History    Marital status:    Tobacco Use    Smoking status: Never    Smokeless tobacco: Never   Substance and Sexual Activity    Alcohol use: Not Currently    Drug use: Never    Sexual activity: Yes        Family History:      Family History   Problem Relation Age of Onset    Arthritis Mother     Hypertension Mother     Hypertension Father     No Known Problems Sister     No Known Problems Sister     No Known Problems Brother     Heart defect Maternal Grandfather     No Known Problems Maternal Grandmother     No Known Problems Paternal Grandfather     No Known Problems Paternal Grandmother      Breast cancer Neg Hx     Cancer Neg Hx     Colon cancer Neg Hx     Ovarian cancer Neg Hx        Allergies:      Review of patient's allergies indicates:  No Known Allergies    Medications:      Current Outpatient Medications   Medication Sig    AUBAGIO 14 mg Tab Take 1 tablet by mouth once daily.    baclofen (LIORESAL) 20 MG tablet TAKE ONE TABLET BY MOUTH EVERY DAY    carBAMazepine (TEGRETOL) 200 mg tablet Take 200 mg by mouth daily as needed.    gabapentin (NEURONTIN) 300 MG capsule Take 1 capsule (300 mg total) by mouth 3 (three) times daily.    levothyroxine (SYNTHROID) 137 MCG Tab tablet Take 137 mcg by mouth.    losartan (COZAAR) 50 MG tablet Take 1 tablet (50 mg total) by mouth once daily.    medroxyPROGESTERone (PROVERA) 10 MG tablet Take 1 tablet (10 mg total) by mouth once daily. for 10 days    multivitamin (THERAGRAN) per tablet Take 1 tablet by mouth once daily.    ferrous sulfate (FEOSOL) Tab tablet Take 1 tablet by mouth daily with breakfast.    ibuprofen (ADVIL,MOTRIN) 800 MG tablet Take 800 mg by mouth 3 (three) times daily as needed.    meclizine (ANTIVERT) 25 mg tablet Take 25 mg by mouth 3 (three) times daily as needed.    naproxen (NAPROSYN) 500 MG tablet Take 1 tablet (500 mg total) by mouth 2 (two) times daily with meals. For pain and inflammation (Patient not taking: Reported on 2/3/2023)     No current facility-administered medications for this encounter.       Vitals:      Vitals:    02/03/23 1315   BP: (!) 157/74   Pulse: 90   Resp: 16   Temp: 98.1 °F (36.7 °C)       Review of Systems:        Constitutional: Negative for fever, chills, weight loss, malaise/fatigue and diaphoresis. Positive for chronic, intermittent dizziness (r/t MS).  HENT: Negative for hearing loss, ear pain, nosebleeds, congestion, sore throat, neck pain, tinnitus and ear discharge.    Eyes: Negative for blurred vision, double vision, photophobia, pain, discharge and redness.   Respiratory: Negative for cough,  hemoptysis, sputum production, shortness of breath, wheezing and stridor.    Cardiovascular: Negative for chest pain, palpitations, orthopnea, claudication, leg swelling and PND.   Gastrointestinal: Negative for heartburn, nausea, vomiting, abdominal pain, diarrhea, constipation, blood in stool and melena.   Genitourinary: Negative for dysuria, urgency, frequency, hematuria and flank pain. Positive for menorrhagia.  Musculoskeletal: Negative for myalgias, back pain, joint pain and falls.   Skin: Negative for itching and rash.   Neurological: Negative for dizziness, tingling, tremors, sensory change, speech change, focal weakness, seizures, loss of consciousness, weakness and headaches.   Endo/Heme/Allergies: Negative for environmental allergies and polydipsia. Does not bruise/bleed easily.   Psychiatric/Behavioral: Negative for depression, suicidal ideas, hallucinations, memory loss and substance abuse. The patient is not nervous/anxious and does not have insomnia.    All 14 systems reviewed and negative except as noted above.    Physical Exam:      Constitutional: Appears well-developed, well-nourished and in no acute distress.  Patient is oriented to person, place, and time.   Head: Normocephalic and atraumatic. Mucous membranes moist.  Neck: Neck supple no mass.   Cardiovascular: Normal rate and regular rhythm.  S1 S2 appreciated by ascultation.  Pulmonary/Chest: Effort normal and clear to auscultation bilaterally. No respiratory distress.   Abdomen: Soft. Non-tender and non-distended. Bowel sounds are normal.   Neurological: Patient is alert and oriented to person, place and time. Moves all extremities.  Skin: Warm and dry. No lesions.  Extremities: No clubbing, cyanosis or edema.    Laboratory data:      Reviewed and noted in plan where applicable. Please see chart for full laboratory data.    No results for input(s): CPK, CPKMB, TROPONINI, MB in the last 24 hours. No results for input(s): POCTGLUCOSE in the  last 24 hours.     No results found for: INR, PROTIME    Lab Results   Component Value Date    WBC 5.44 06/18/2022    HGB 11.3 (L) 06/18/2022    HCT 36.9 (L) 06/18/2022    MCV 97 06/18/2022     06/18/2022       No results for input(s): GLU, NA, K, CL, CO2, BUN, CREATININE, CALCIUM, MG in the last 24 hours.    Predictors of intubation difficulty:       Morbid obesity? no   Anatomically abnormal facies? no   Prominent incisors? no   Receding mandible? no   Short, thick neck? no   Neck range of motion: normal   Dentition: No chipped, loose, or missing teeth.  Based on the Modified Mallampati, patient is a mallampati score: I (soft palate, uvula, fauces, and tonsillar pillars visible)    Cardiographics:      ECG:  Not indicated.    Echocardiogram: not indicated    Imaging:      Chest x-ray: not indicated    Assessment and Plan:      DUB (dysfunctional uterine bleeding)  - Patient presents today at the request of Dr. Rodarte who plans on performing a Robotic hysterectomy and salpingectomy with possible oophorectomy on 2/10.    Known risk factors for perioperative complications: None    Difficulty with intubation is not anticipated.    Cardiac Risk Estimation: Based on the Revised Cardiac Risk index, patient is a Class 1 risk with a 3.9% risk of a major cardiac event in a low risk procedure.    1.) Preoperative workup as follows: ECG, hemoglobin, hematocrit, electrolytes, creatinine, glucose, liver function studies.  2.) Change in medication regimen before surgery: discontinue ASA 6 days before surgery, discontinue NSAIDs 5 days before surgery.  3.) Prophylaxis for cardiac events with perioperative beta-blockers: not indicated.  4.) Invasive hemodynamic monitoring perioperatively: not indicated.  5.) Deep vein thrombosis prophylaxis postoperatively: intermittent pneumatic compression boots and regimen to be chosen by surgical team.  6.) Surveillance for postoperative MI with ECG immediately postoperatively and on  postoperati ve days 1 and 2 AND troponin levels 24 hours postoperatively and on day 4 or hospital discharge (whichever comes first): not indicated.  7.) Current medications which may produce withdrawal symptoms if withheld perioperatively: None.  8.) Other measures: None.    DRE (stress urinary incontinence, female)  - May undergo Lynne procedure pending Urodynamic study.       Hypertension  - BP under fair control.  - Continue Losartan for now with instructions to hold the morning of surgery to avoid hypotension associated with anesthesia, and resume postoperatively as directed.    MS (multiple sclerosis)  - Continue Aubagio.    Postoperative hypothyroidism  - Continue Synthroid.        Electronically signed by Michelle Camargo DNP, ACNP on 2/3/2023 at 1:25 PM.

## 2023-02-03 NOTE — ASSESSMENT & PLAN NOTE
- BP under fair control.  - Continue Losartan for now with instructions to hold the morning of surgery to avoid hypotension associated with anesthesia, and resume postoperatively as directed.

## 2023-02-03 NOTE — DISCHARGE INSTRUCTIONS
To confirm, your doctor has instructed you that surgery is scheduled for 2/10/2023.       Pre admit office will call the afternoon prior to surgery between 1PM and 3PM with arrival time.    Surgery will be at [facility],  The address is [address]. [place]  [address].      IMPORTANT INSTRUCTIONS!    Do not eat or drink after 12 midnight, including water.   Do not smoke or use chewing tobacco after 12 midnight  OK to brush teeth, but no gum, candy, or mints!      Take only these medicines with a small swallow of water-morning of surgery.     Synthroid, Aubagio, & Provera         ____ Stop Aspirin, Ibuprofen, Motrin and Aleve at least 5-7 days before surgery, unless otherwise instructed by your doctor, or the nurse.   You MAY use Tylenol/acetaminophen until day of surgery.      ____  If you take diabetic medication, do NOT take morning of surgery unless instructed by Doctor. Metformin must be stopped 24 hrs prior to surgery time.       ____ Stop taking any Fish Oil supplements or Vitamins at least 5 days prior to surgery, unless instructed otherwise by your Doctor.       Please notify MD office if you have an active infection, currently taking antibiotics or received a vaccination within the past 7 days.      Bathing Instructions: The night before surgery and the morning prior to coming to the hospital:    - Shower & rinse your body as usual with anti-bacterial Soap (Dial or Lever 2000)   -Hibiclens (if indicated) use AFTER anti-bacterial soap; 1 packet PM/1 packet in AM on surgical site only   -Do not use hibiclens on your head, face, or genitals.    -Do not wash with anti-bacterial soap after you use the hibiclens.    -Do not shave surgical site 5-7 days prior to surgery.    -Pubic hair 7 days prior to surgery (gyn pt's).      Pediatric patients do not need to use anti-bacterial soap or Hibiclens.             After Bathing:   __ No powder, lotions, creams, or body spray to skin     __No  deodorant for any breast procedure, PORT, or upper arm surgery     __ No makeup, mascara, nail polish or artificial nails        **SURGERY WILL BE CANCELLED IF ARTIFICIAL/NAIL POLISH IS PRESENT!!!**    __ Please remove all piercings and leave all jewelry at home.    **SURGERY WILL BE CANCELLED IF PIERCINGS ARE PRESENT!!!**      __ Dentures, Hearing Aids and Contact Lens need to be removed prior to the start of surgery.      __ Wear clean, loose-fitting clothing. Allow for dressings/bandages/surgical equipment     __ You must have transportation, and they MUST stay the entire time.       [facility] Visitor/Ride Policy:   Only 1 adult allowed (over the age of 18) to accompany you into Pre-op/Recovery Surgery Dept and must stay through the entire length of admission.     Must have a ride home from a responsible adult that you know and trust.      Pediatric Patients are allowed 2 adult visitors.     Medical Transport, Uber or Lyft can only be used if patient has a responsible adult to accompany them during ride home.         Post-Op Instructions: You will receive surgery post-op instructions by your Discharge Nurse prior to going home.     Surgical Site Infection:   Prevention of surgical site infections:   -Keep incisions clean and dry.   -Do not soak/submerge incisions in water until completely healed.   -Do not apply lotions, powders, creams, or deodorants to site.   -Always make sure hands are cleaned with antibacterial soap/ alcohol-based   prior to touching the surgical site.       Signs and symptoms:               -Redness and pain around the area where you had surgery               -Drainage of cloudy fluid from your surgical wound               -Fever over 100.4 or chills     >>>Call Surgeon office/on-call Surgeon if you experience any of these signs & symptoms post-surgery.        *Please Call [facility] Pre-Admissions Department with surgery instruction questions at [phone] or [phone].      *Insurance Questions, please call [phone] or [phone]

## 2023-02-07 ENCOUNTER — PATIENT MESSAGE (OUTPATIENT)
Dept: SURGERY | Facility: HOSPITAL | Age: 48
End: 2023-02-07
Payer: COMMERCIAL

## 2023-02-08 ENCOUNTER — PATIENT MESSAGE (OUTPATIENT)
Dept: SURGERY | Facility: HOSPITAL | Age: 48
End: 2023-02-08
Payer: COMMERCIAL

## 2023-02-08 ENCOUNTER — ANESTHESIA EVENT (OUTPATIENT)
Dept: SURGERY | Facility: HOSPITAL | Age: 48
End: 2023-02-08
Payer: COMMERCIAL

## 2023-02-08 RX ORDER — SODIUM CHLORIDE, SODIUM LACTATE, POTASSIUM CHLORIDE, CALCIUM CHLORIDE 600; 310; 30; 20 MG/100ML; MG/100ML; MG/100ML; MG/100ML
INJECTION, SOLUTION INTRAVENOUS CONTINUOUS
Status: CANCELLED | OUTPATIENT
Start: 2023-02-08

## 2023-02-08 NOTE — ANESTHESIA PREPROCEDURE EVALUATION
02/08/2023  Nisha Mancuso is a 47 y.o., female.  Patient Active Problem List   Diagnosis    MS (multiple sclerosis)    History of thyroid cancer    Postoperative hypothyroidism    Hypertension    DUB (dysfunctional uterine bleeding)    DRE (stress urinary incontinence, female)           Pre-op Assessment    I have reviewed the Patient Summary Reports.     I have reviewed the Nursing Notes. I have reviewed the NPO Status.   I have reviewed the Medications.     Review of Systems  Anesthesia Hx:  No problems with previous Anesthesia  Denies Family Hx of Anesthesia complications.   Denies Personal Hx of Anesthesia complications.   Social:  Non-Smoker, No Alcohol Use    Hematology/Oncology:  Hematology Normal       -- Cancer in past history:  Oncology Comments: thyroid CA      EENT/Dental:EENT/Dental Normal   Cardiovascular:   Hypertension    Pulmonary:  Pulmonary Normal    Renal/:  Renal/ Normal     Hepatic/GI:   Bowel Prep.    Musculoskeletal:  Musculoskeletal Normal    Neurological:   MS   Endocrine:   Hypothyroidism  Obesity / BMI > 30  Dermatological:  Skin Normal    Psych:  Psychiatric Normal           Physical Exam  General: Cooperative, Alert, Oriented and Well nourished    Airway:  Mouth Opening: Normal  TM Distance: Normal  Tongue: Normal  Neck ROM: Normal ROM    Dental:  Intact    Chest/Lungs:  Clear to auscultation, Normal Respiratory Rate    Heart:  Rate: Normal  Rhythm: Regular Rhythm        Anesthesia Plan  Type of Anesthesia, risks & benefits discussed:    Anesthesia Type: Gen ETT  Intra-op Monitoring Plan: Standard ASA Monitors  Post Op Pain Control Plan: multimodal analgesia  Induction:  IV  Informed Consent: Informed consent signed with the Patient and all parties understand the risks and agree with anesthesia plan.  All questions answered. Patient consented to blood products? No  ASA  Score: 2  Day of Surgery Review of History & Physical: H&P Update referred to the surgeon/provider.    Ready For Surgery From Anesthesia Perspective.     .

## 2023-02-09 ENCOUNTER — TELEPHONE (OUTPATIENT)
Dept: PREADMISSION TESTING | Facility: HOSPITAL | Age: 48
End: 2023-02-09
Payer: COMMERCIAL

## 2023-02-09 NOTE — TELEPHONE ENCOUNTER
Called and spoke with the patient about the following:     Your Surgery arrival time is at 5:30AM on 2/10/23 at [facility] location.   The address is [address] [place]  [address].      Only one adult (over 18) is to accompany you to surgery, unless it is a Pediatric patient, then 2 adults are encouraged to accompany them to the surgery center.     Your ride MUST STAY the entire time until you are discharged.      Please come to the main lobby and be prepared to show your photo ID and insurance card.      Nothing to eat or drink after midnight, unless you were instructed to take specific medications discussed with the Pre-admit Nurse.      Please call [phone] or [phone] with any questions or concerns.      Thanks.

## 2023-02-10 ENCOUNTER — HOSPITAL ENCOUNTER (OUTPATIENT)
Facility: HOSPITAL | Age: 48
Discharge: HOME | End: 2023-02-10
Attending: OBSTETRICS & GYNECOLOGY | Admitting: OBSTETRICS & GYNECOLOGY
Payer: COMMERCIAL

## 2023-02-10 ENCOUNTER — ANESTHESIA (OUTPATIENT)
Dept: SURGERY | Facility: HOSPITAL | Age: 48
End: 2023-02-10
Payer: COMMERCIAL

## 2023-02-10 VITALS
DIASTOLIC BLOOD PRESSURE: 71 MMHG | TEMPERATURE: 98 F | WEIGHT: 168.13 LBS | OXYGEN SATURATION: 100 % | HEIGHT: 65 IN | SYSTOLIC BLOOD PRESSURE: 126 MMHG | HEART RATE: 68 BPM | BODY MASS INDEX: 28.01 KG/M2 | RESPIRATION RATE: 16 BRPM

## 2023-02-10 DIAGNOSIS — N83.202 LEFT OVARIAN CYST: ICD-10-CM

## 2023-02-10 DIAGNOSIS — N39.3 SUI (STRESS URINARY INCONTINENCE, FEMALE): ICD-10-CM

## 2023-02-10 DIAGNOSIS — N93.8 DUB (DYSFUNCTIONAL UTERINE BLEEDING): ICD-10-CM

## 2023-02-10 PROBLEM — Z90.710 STATUS POST LAPAROSCOPIC HYSTERECTOMY: Status: ACTIVE | Noted: 2023-02-10

## 2023-02-10 PROBLEM — N88.8 CERVICAL MASS: Status: ACTIVE | Noted: 2023-02-10

## 2023-02-10 LAB
B-HCG UR QL: NEGATIVE
CTP QC/QA: YES
POCT GLUCOSE: 85 MG/DL (ref 70–110)

## 2023-02-10 PROCEDURE — 36000712 HC OR TIME LEV V 1ST 15 MIN: Performed by: OBSTETRICS & GYNECOLOGY

## 2023-02-10 PROCEDURE — 88307 TISSUE EXAM BY PATHOLOGIST: CPT | Performed by: PATHOLOGY

## 2023-02-10 PROCEDURE — D9220A PRA ANESTHESIA: Mod: ANES,,, | Performed by: ANESTHESIOLOGY

## 2023-02-10 PROCEDURE — 81025 URINE PREGNANCY TEST: CPT | Performed by: OBSTETRICS & GYNECOLOGY

## 2023-02-10 PROCEDURE — 71000033 HC RECOVERY, INTIAL HOUR: Performed by: OBSTETRICS & GYNECOLOGY

## 2023-02-10 PROCEDURE — D9220A PRA ANESTHESIA: Mod: CRNA,,, | Performed by: NURSE ANESTHETIST, CERTIFIED REGISTERED

## 2023-02-10 PROCEDURE — 37000009 HC ANESTHESIA EA ADD 15 MINS: Performed by: OBSTETRICS & GYNECOLOGY

## 2023-02-10 PROCEDURE — D9220A PRA ANESTHESIA: ICD-10-PCS | Mod: CRNA,,, | Performed by: NURSE ANESTHETIST, CERTIFIED REGISTERED

## 2023-02-10 PROCEDURE — 88305 TISSUE EXAM BY PATHOLOGIST: ICD-10-PCS | Mod: 26,,, | Performed by: PATHOLOGY

## 2023-02-10 PROCEDURE — 71000016 HC POSTOP RECOV ADDL HR: Performed by: OBSTETRICS & GYNECOLOGY

## 2023-02-10 PROCEDURE — C1889 IMPLANT/INSERT DEVICE, NOC: HCPCS | Performed by: OBSTETRICS & GYNECOLOGY

## 2023-02-10 PROCEDURE — 25000003 PHARM REV CODE 250: Performed by: NURSE ANESTHETIST, CERTIFIED REGISTERED

## 2023-02-10 PROCEDURE — 88307 PR  SURG PATH,LEVEL V: ICD-10-PCS | Mod: 26,,, | Performed by: PATHOLOGY

## 2023-02-10 PROCEDURE — 58571 PR LAPAROSCOPY W TOT HYSTERECTUTERUS <=250 GRAM  W TUBE/OVARY: ICD-10-PCS | Mod: AS,,, | Performed by: PHYSICIAN ASSISTANT

## 2023-02-10 PROCEDURE — 88305 TISSUE EXAM BY PATHOLOGIST: CPT | Mod: 26,,, | Performed by: PATHOLOGY

## 2023-02-10 PROCEDURE — 63600175 PHARM REV CODE 636 W HCPCS: Performed by: OBSTETRICS & GYNECOLOGY

## 2023-02-10 PROCEDURE — 58571 PR LAPAROSCOPY W TOT HYSTERECTUTERUS <=250 GRAM  W TUBE/OVARY: ICD-10-PCS | Mod: ,,, | Performed by: OBSTETRICS & GYNECOLOGY

## 2023-02-10 PROCEDURE — C2628 CATHETER, OCCLUSION: HCPCS | Performed by: OBSTETRICS & GYNECOLOGY

## 2023-02-10 PROCEDURE — 88305 TISSUE EXAM BY PATHOLOGIST: CPT | Performed by: PATHOLOGY

## 2023-02-10 PROCEDURE — 25000003 PHARM REV CODE 250: Performed by: OBSTETRICS & GYNECOLOGY

## 2023-02-10 PROCEDURE — 58571 TLH W/T/O 250 G OR LESS: CPT | Mod: ,,, | Performed by: OBSTETRICS & GYNECOLOGY

## 2023-02-10 PROCEDURE — 36000713 HC OR TIME LEV V EA ADD 15 MIN: Performed by: OBSTETRICS & GYNECOLOGY

## 2023-02-10 PROCEDURE — 27201423 OPTIME MED/SURG SUP & DEVICES STERILE SUPPLY: Performed by: OBSTETRICS & GYNECOLOGY

## 2023-02-10 PROCEDURE — 88307 TISSUE EXAM BY PATHOLOGIST: CPT | Mod: 26,,, | Performed by: PATHOLOGY

## 2023-02-10 PROCEDURE — 82962 GLUCOSE BLOOD TEST: CPT | Performed by: OBSTETRICS & GYNECOLOGY

## 2023-02-10 PROCEDURE — 71000015 HC POSTOP RECOV 1ST HR: Performed by: OBSTETRICS & GYNECOLOGY

## 2023-02-10 PROCEDURE — 63600175 PHARM REV CODE 636 W HCPCS: Performed by: NURSE ANESTHETIST, CERTIFIED REGISTERED

## 2023-02-10 PROCEDURE — 37000008 HC ANESTHESIA 1ST 15 MINUTES: Performed by: OBSTETRICS & GYNECOLOGY

## 2023-02-10 PROCEDURE — D9220A PRA ANESTHESIA: ICD-10-PCS | Mod: ANES,,, | Performed by: ANESTHESIOLOGY

## 2023-02-10 PROCEDURE — 58571 TLH W/T/O 250 G OR LESS: CPT | Mod: AS,,, | Performed by: PHYSICIAN ASSISTANT

## 2023-02-10 RX ORDER — MUPIROCIN 20 MG/G
OINTMENT TOPICAL
Status: DISCONTINUED | OUTPATIENT
Start: 2023-02-10 | End: 2023-02-10 | Stop reason: HOSPADM

## 2023-02-10 RX ORDER — DEXMEDETOMIDINE HYDROCHLORIDE 100 UG/ML
INJECTION, SOLUTION INTRAVENOUS
Status: DISCONTINUED | OUTPATIENT
Start: 2023-02-10 | End: 2023-02-10

## 2023-02-10 RX ORDER — HYDROCODONE BITARTRATE AND ACETAMINOPHEN 5; 325 MG/1; MG/1
1 TABLET ORAL EVERY 4 HOURS PRN
Status: DISCONTINUED | OUTPATIENT
Start: 2023-02-10 | End: 2023-02-10 | Stop reason: HOSPADM

## 2023-02-10 RX ORDER — ACETAMINOPHEN 325 MG/1
650 TABLET ORAL EVERY 4 HOURS PRN
Status: DISCONTINUED | OUTPATIENT
Start: 2023-02-10 | End: 2023-02-10 | Stop reason: HOSPADM

## 2023-02-10 RX ORDER — PROCHLORPERAZINE EDISYLATE 5 MG/ML
5 INJECTION INTRAMUSCULAR; INTRAVENOUS EVERY 6 HOURS PRN
Status: DISCONTINUED | OUTPATIENT
Start: 2023-02-10 | End: 2023-02-10 | Stop reason: HOSPADM

## 2023-02-10 RX ORDER — DIPHENHYDRAMINE HCL 25 MG
25 CAPSULE ORAL EVERY 4 HOURS PRN
Status: DISCONTINUED | OUTPATIENT
Start: 2023-02-10 | End: 2023-02-10 | Stop reason: HOSPADM

## 2023-02-10 RX ORDER — FENTANYL CITRATE 50 UG/ML
INJECTION, SOLUTION INTRAMUSCULAR; INTRAVENOUS
Status: DISCONTINUED | OUTPATIENT
Start: 2023-02-10 | End: 2023-02-10

## 2023-02-10 RX ORDER — IBUPROFEN 200 MG
800 TABLET ORAL
Status: DISCONTINUED | OUTPATIENT
Start: 2023-02-11 | End: 2023-02-10 | Stop reason: HOSPADM

## 2023-02-10 RX ORDER — SODIUM CHLORIDE, SODIUM LACTATE, POTASSIUM CHLORIDE, CALCIUM CHLORIDE 600; 310; 30; 20 MG/100ML; MG/100ML; MG/100ML; MG/100ML
INJECTION, SOLUTION INTRAVENOUS CONTINUOUS
Status: DISCONTINUED | OUTPATIENT
Start: 2023-02-10 | End: 2023-02-10 | Stop reason: HOSPADM

## 2023-02-10 RX ORDER — DEXAMETHASONE SODIUM PHOSPHATE 4 MG/ML
INJECTION, SOLUTION INTRA-ARTICULAR; INTRALESIONAL; INTRAMUSCULAR; INTRAVENOUS; SOFT TISSUE
Status: DISCONTINUED | OUTPATIENT
Start: 2023-02-10 | End: 2023-02-10

## 2023-02-10 RX ORDER — ONDANSETRON 2 MG/ML
4 INJECTION INTRAMUSCULAR; INTRAVENOUS DAILY PRN
Status: DISCONTINUED | OUTPATIENT
Start: 2023-02-10 | End: 2023-02-10 | Stop reason: HOSPADM

## 2023-02-10 RX ORDER — FAMOTIDINE 20 MG/1
20 TABLET, FILM COATED ORAL
Status: COMPLETED | OUTPATIENT
Start: 2023-02-10 | End: 2023-02-10

## 2023-02-10 RX ORDER — MEPERIDINE HYDROCHLORIDE 25 MG/ML
12.5 INJECTION INTRAMUSCULAR; INTRAVENOUS; SUBCUTANEOUS ONCE AS NEEDED
Status: COMPLETED | OUTPATIENT
Start: 2023-02-10 | End: 2023-02-10

## 2023-02-10 RX ORDER — ONDANSETRON 2 MG/ML
INJECTION INTRAMUSCULAR; INTRAVENOUS
Status: DISCONTINUED | OUTPATIENT
Start: 2023-02-10 | End: 2023-02-10

## 2023-02-10 RX ORDER — SODIUM CHLORIDE 0.9 % (FLUSH) 0.9 %
3 SYRINGE (ML) INJECTION
Status: DISCONTINUED | OUTPATIENT
Start: 2023-02-10 | End: 2023-02-10 | Stop reason: HOSPADM

## 2023-02-10 RX ORDER — HYDROMORPHONE HYDROCHLORIDE 2 MG/ML
1 INJECTION, SOLUTION INTRAMUSCULAR; INTRAVENOUS; SUBCUTANEOUS EVERY 4 HOURS PRN
Status: DISCONTINUED | OUTPATIENT
Start: 2023-02-10 | End: 2023-02-10 | Stop reason: HOSPADM

## 2023-02-10 RX ORDER — AMOXICILLIN 250 MG
1 CAPSULE ORAL 2 TIMES DAILY
Status: DISCONTINUED | OUTPATIENT
Start: 2023-02-10 | End: 2023-02-10 | Stop reason: HOSPADM

## 2023-02-10 RX ORDER — ONDANSETRON 8 MG/1
8 TABLET, ORALLY DISINTEGRATING ORAL EVERY 8 HOURS PRN
Status: DISCONTINUED | OUTPATIENT
Start: 2023-02-10 | End: 2023-02-10 | Stop reason: HOSPADM

## 2023-02-10 RX ORDER — CEFAZOLIN SODIUM 2 G/50ML
2 SOLUTION INTRAVENOUS
Status: DISCONTINUED | OUTPATIENT
Start: 2023-02-10 | End: 2023-02-10 | Stop reason: HOSPADM

## 2023-02-10 RX ORDER — MIDAZOLAM HYDROCHLORIDE 1 MG/ML
INJECTION, SOLUTION INTRAMUSCULAR; INTRAVENOUS
Status: DISCONTINUED | OUTPATIENT
Start: 2023-02-10 | End: 2023-02-10

## 2023-02-10 RX ORDER — KETOROLAC TROMETHAMINE 30 MG/ML
INJECTION, SOLUTION INTRAMUSCULAR; INTRAVENOUS
Status: DISCONTINUED | OUTPATIENT
Start: 2023-02-10 | End: 2023-02-10

## 2023-02-10 RX ORDER — HYDROCODONE BITARTRATE AND ACETAMINOPHEN 10; 325 MG/1; MG/1
1 TABLET ORAL EVERY 4 HOURS PRN
Status: DISCONTINUED | OUTPATIENT
Start: 2023-02-10 | End: 2023-02-10 | Stop reason: HOSPADM

## 2023-02-10 RX ORDER — ROCURONIUM BROMIDE 10 MG/ML
INJECTION, SOLUTION INTRAVENOUS
Status: DISCONTINUED | OUTPATIENT
Start: 2023-02-10 | End: 2023-02-10

## 2023-02-10 RX ORDER — LIDOCAINE HYDROCHLORIDE 10 MG/ML
0.5 INJECTION, SOLUTION EPIDURAL; INFILTRATION; INTRACAUDAL; PERINEURAL
Status: DISCONTINUED | OUTPATIENT
Start: 2023-02-10 | End: 2023-02-10 | Stop reason: HOSPADM

## 2023-02-10 RX ORDER — SUCCINYLCHOLINE CHLORIDE 20 MG/ML
INJECTION INTRAMUSCULAR; INTRAVENOUS
Status: DISCONTINUED | OUTPATIENT
Start: 2023-02-10 | End: 2023-02-10

## 2023-02-10 RX ORDER — KETOROLAC TROMETHAMINE 30 MG/ML
30 INJECTION, SOLUTION INTRAMUSCULAR; INTRAVENOUS
Status: DISCONTINUED | OUTPATIENT
Start: 2023-02-10 | End: 2023-02-10 | Stop reason: HOSPADM

## 2023-02-10 RX ORDER — LIDOCAINE HYDROCHLORIDE 20 MG/ML
INJECTION, SOLUTION EPIDURAL; INFILTRATION; INTRACAUDAL; PERINEURAL
Status: DISCONTINUED | OUTPATIENT
Start: 2023-02-10 | End: 2023-02-10

## 2023-02-10 RX ORDER — ACETAMINOPHEN 500 MG
1000 TABLET ORAL
Status: COMPLETED | OUTPATIENT
Start: 2023-02-10 | End: 2023-02-10

## 2023-02-10 RX ORDER — HYDROMORPHONE HYDROCHLORIDE 2 MG/ML
0.2 INJECTION, SOLUTION INTRAMUSCULAR; INTRAVENOUS; SUBCUTANEOUS EVERY 5 MIN PRN
Status: DISCONTINUED | OUTPATIENT
Start: 2023-02-10 | End: 2023-02-10 | Stop reason: HOSPADM

## 2023-02-10 RX ORDER — POLYETHYLENE GLYCOL 3350 17 G/17G
17 POWDER, FOR SOLUTION ORAL DAILY
Status: DISCONTINUED | OUTPATIENT
Start: 2023-02-10 | End: 2023-02-10 | Stop reason: HOSPADM

## 2023-02-10 RX ORDER — DIPHENHYDRAMINE HYDROCHLORIDE 50 MG/ML
25 INJECTION INTRAMUSCULAR; INTRAVENOUS EVERY 4 HOURS PRN
Status: DISCONTINUED | OUTPATIENT
Start: 2023-02-10 | End: 2023-02-10 | Stop reason: HOSPADM

## 2023-02-10 RX ORDER — HYDROCODONE BITARTRATE AND ACETAMINOPHEN 5; 325 MG/1; MG/1
1 TABLET ORAL EVERY 6 HOURS PRN
Qty: 15 TABLET | Refills: 0 | Status: SHIPPED | OUTPATIENT
Start: 2023-02-10 | End: 2023-02-17

## 2023-02-10 RX ORDER — PROCHLORPERAZINE EDISYLATE 5 MG/ML
5 INJECTION INTRAMUSCULAR; INTRAVENOUS EVERY 10 MIN PRN
Status: DISCONTINUED | OUTPATIENT
Start: 2023-02-10 | End: 2023-02-10 | Stop reason: HOSPADM

## 2023-02-10 RX ADMIN — ROCURONIUM BROMIDE 5 MG: 10 INJECTION, SOLUTION INTRAVENOUS at 07:02

## 2023-02-10 RX ADMIN — MIDAZOLAM 2 MG: 1 INJECTION INTRAMUSCULAR; INTRAVENOUS at 07:02

## 2023-02-10 RX ADMIN — ROCURONIUM BROMIDE 35 MG: 10 INJECTION, SOLUTION INTRAVENOUS at 07:02

## 2023-02-10 RX ADMIN — KETOROLAC TROMETHAMINE 30 MG: 30 INJECTION, SOLUTION INTRAMUSCULAR at 08:02

## 2023-02-10 RX ADMIN — ONDANSETRON 4 MG: 2 INJECTION, SOLUTION INTRAMUSCULAR; INTRAVENOUS at 08:02

## 2023-02-10 RX ADMIN — SUCCINYLCHOLINE CHLORIDE 140 MG: 20 INJECTION, SOLUTION INTRAMUSCULAR; INTRAVENOUS at 07:02

## 2023-02-10 RX ADMIN — ACETAMINOPHEN 1000 MG: 500 TABLET ORAL at 06:02

## 2023-02-10 RX ADMIN — DEXTROSE 2 G: 50 INJECTION, SOLUTION INTRAVENOUS at 07:02

## 2023-02-10 RX ADMIN — KETOROLAC TROMETHAMINE 30 MG: 30 INJECTION, SOLUTION INTRAMUSCULAR at 11:02

## 2023-02-10 RX ADMIN — MEPERIDINE HYDROCHLORIDE 12.5 MG: 25 INJECTION INTRAMUSCULAR; INTRAVENOUS; SUBCUTANEOUS at 09:02

## 2023-02-10 RX ADMIN — DEXMEDETOMIDINE HYDROCHLORIDE 4 MCG: 100 INJECTION, SOLUTION INTRAVENOUS at 08:02

## 2023-02-10 RX ADMIN — DEXAMETHASONE SODIUM PHOSPHATE 8 MG: 4 INJECTION, SOLUTION INTRA-ARTICULAR; INTRALESIONAL; INTRAMUSCULAR; INTRAVENOUS; SOFT TISSUE at 07:02

## 2023-02-10 RX ADMIN — FAMOTIDINE 20 MG: 20 TABLET ORAL at 06:02

## 2023-02-10 RX ADMIN — SODIUM CHLORIDE, POTASSIUM CHLORIDE, SODIUM LACTATE AND CALCIUM CHLORIDE: 600; 310; 30; 20 INJECTION, SOLUTION INTRAVENOUS at 06:02

## 2023-02-10 RX ADMIN — FENTANYL CITRATE 50 MCG: 50 INJECTION, SOLUTION INTRAMUSCULAR; INTRAVENOUS at 08:02

## 2023-02-10 RX ADMIN — HYDROCODONE BITARTRATE AND ACETAMINOPHEN 1 TABLET: 5; 325 TABLET ORAL at 09:02

## 2023-02-10 RX ADMIN — FENTANYL CITRATE 100 MCG: 50 INJECTION, SOLUTION INTRAMUSCULAR; INTRAVENOUS at 07:02

## 2023-02-10 RX ADMIN — LIDOCAINE HYDROCHLORIDE 70 MG: 20 INJECTION, SOLUTION EPIDURAL; INFILTRATION; INTRACAUDAL; PERINEURAL at 07:02

## 2023-02-10 RX ADMIN — SUGAMMADEX 150 MG: 100 INJECTION, SOLUTION INTRAVENOUS at 08:02

## 2023-02-10 NOTE — TRANSFER OF CARE
"Anesthesia Transfer of Care Note    Patient: Nisha Mancuso    Procedure(s) Performed: Procedure(s) (LRB):  XI ROBOTIC HYSTERECTOMY,WITH SALPINGECTOMY (N/A)  EXCISION, MASS (N/A)    Patient location: PACU    Anesthesia Type: general    Transport from OR: Transported from OR on room air with adequate spontaneous ventilation    Post pain: adequate analgesia    Post assessment: no apparent anesthetic complications and tolerated procedure well    Post vital signs: stable    Level of consciousness: awake    Nausea/Vomiting: no nausea/vomiting    Complications: none    Transfer of care protocol was followed      Last vitals:   Visit Vitals  /73 (BP Location: Right arm, Patient Position: Sitting)   Pulse 82   Temp 36.5 °C (97.7 °F) (Temporal)   Resp 16   Ht 5' 5" (1.651 m)   Wt 76.2 kg (168 lb 1.6 oz)   LMP  (LMP Unknown)   SpO2 98%   Breastfeeding No   BMI 27.97 kg/m²     "

## 2023-02-10 NOTE — PLAN OF CARE
Anesthesia and surgeon notified that patient drank apple juice at 0515 hrs this am. No new orders.

## 2023-02-10 NOTE — H&P
[facility]  Obstetrics & Gynecology  Pre op note     Patient Name: Nisha Mancuso  MRN: [id number]  Admission Date: 2/10/2023  Primary Care Provider: Kit Apple MD    Subjective:     Chief Complaint/Reason for Admission: Abnormal uterine bleeding     History of Present Illness:  DYSFUNCTIONAL UTERINE BLEEDING with loss of control with progestin therapy, benign EmBx and normal uterine ultrasound       No new subjective & objective note has been filed under this hospital service since the last note was generated.    Assessment/Plan:     * DUB (dysfunctional uterine bleeding)  Robotic hysterectomy bilateral salpingectomy         F Moises Rodarte MD  Obstetrics & Gynecology  [facility]

## 2023-02-10 NOTE — ANESTHESIA PROCEDURE NOTES
Intubation    Date/Time: 2/10/2023 7:23 AM  Performed by: Jack Segundo CRNA  Authorized by: Capri Barton MD     Intubation:     Induction:  Rapid sequence induction    Intubated:  Postinduction    Mask Ventilation:  Not attempted    Attempts:  1    Attempted By:  CRNA    Method of Intubation:  Direct    Blade:  Gutierrez 2    Laryngeal View Grade: Grade I - full view of cords      Difficult Airway Encountered?: No      Complications:  None    Airway Device:  Oral endotracheal tube    Airway Device Size:  7.5    Style/Cuff Inflation:  Cuffed (inflated to minimal occlusive pressure)    Tube secured:  21    Secured at:  The lips    Placement Verified By:  Capnometry    Complicating Factors:  None    Findings Post-Intubation:  BS equal bilateral and atraumatic/condition of teeth unchanged

## 2023-02-10 NOTE — ANESTHESIA POSTPROCEDURE EVALUATION
Anesthesia Post Evaluation    Patient: Nisha Mancuso    Procedure(s) Performed: Procedure(s) (LRB):  XI ROBOTIC HYSTERECTOMY,WITH SALPINGECTOMY (N/A)  EXCISION, MASS (N/A)    Final Anesthesia Type: general      Patient location during evaluation: PACU  Patient participation: Yes- Able to Participate  Level of consciousness: awake and alert and oriented  Post-procedure vital signs: reviewed and stable  Pain management: adequate  Airway patency: patent    PONV status at discharge: No PONV  Anesthetic complications: no      Cardiovascular status: blood pressure returned to baseline, stable and hemodynamically stable  Respiratory status: unassisted  Hydration status: euvolemic  Follow-up not needed.          Vitals Value Taken Time   /74 02/10/23 0956   Temp 36.7 °C (98 °F) 02/10/23 0900   Pulse 68 02/10/23 0955   Resp 25 02/10/23 0955   SpO2 100 % 02/10/23 0955   Vitals shown include unvalidated device data.      Event Time   Out of Recovery 10:00:00         Pain/Yessenia Score: Pain Rating Prior to Med Admin: 7 (2/10/2023  9:36 AM)  Yessenia Score: 10 (2/10/2023 10:00 AM)

## 2023-02-10 NOTE — PLAN OF CARE
Reviewed and completed all PACU orders. I encouraged questions, answered them thoroughly, and evaluated my instructions via teach-back method. I have disconnected patient from monitoring equipment and prepared them for the next phase of care. Patient has met all PACU discharge criteria at this point. Patient and family agree with the plan of care. Report given to ROBETRO Hannah. Patient moved to [facility] room 4 for extended recovery.

## 2023-02-10 NOTE — HPI
DYSFUNCTIONAL UTERINE BLEEDING with loss of control with progestin therapy, benign EmBx and normal uterine ultrasound

## 2023-02-10 NOTE — DISCHARGE SUMMARY
[facility]  Obstetrics & Gynecology  Discharge Summary    Patient Name: Nisha Mancuso  MRN: [id number]  Admission Date: 2/10/2023  Hospital Length of Stay: 0 days  Discharge Date and Time:  02/10/2023 8:45 AM  Attending Physician: ERIK Rodarte MD   Discharging Provider: BASIM Rodarte MD  Primary Care Provider: Kit Apple MD    HPI:  DYSFUNCTIONAL UTERINE BLEEDING with loss of control with progestin therapy, benign EmBx and normal uterine ultrasound       Hospital Course:  No notes on file    Goals of Care Treatment Preferences:  Code Status: Full Code      Procedure(s) (LRB):  XI ROBOTIC HYSTERECTOMY,WITH SALPINGECTOMY (N/A)  EXCISION, MASS (N/A)         Significant Diagnostic Studies: none       Pending Diagnostic Studies:     Procedure Component Value Units Date/Time    Specimen to Pathology, Surgery Gynecology and Obstetrics [[id number]] Collected: 02/10/23 0832    Order Status: Sent Lab Status: In process Updated: 02/10/23 0832    Specimen: Tissue         Final Active Diagnoses:    Diagnosis Date Noted POA    Status post laparoscopic hysterectomy [Z90.710] 02/10/2023 No    Cervical mass [N88.8] 02/10/2023 Yes      Problems Resolved During this Admission:    Diagnosis Date Noted Date Resolved POA    PRINCIPAL PROBLEM:  DUB (dysfunctional uterine bleeding) [N93.8] 02/03/2023 02/10/2023 Yes        Discharged Condition: good    Disposition:     Follow Up:    Patient Instructions:   No discharge procedures on file.  Medications:  Reconciled Home Medications:      Medication List      START taking these medications    HYDROcodone-acetaminophen 5-325 mg per tablet  Commonly known as: NORCO  Take 1 tablet by mouth every 6 (six) hours as needed for Pain.        CONTINUE taking these medications    AUBAGIO 14 mg Tab  Generic drug: teriflunomide  Take 1 tablet by mouth once daily.     baclofen 20 MG tablet  Commonly known as: LIORESAL  TAKE ONE TABLET BY MOUTH EVERY DAY     carBAMazepine  200 mg tablet  Commonly known as: TEGRETOL  Take 200 mg by mouth daily as needed.     ferrous sulfate Tab tablet  Commonly known as: FEOSOL  Take 1 tablet by mouth daily with breakfast.     gabapentin 300 MG capsule  Commonly known as: NEURONTIN  Take 1 capsule (300 mg total) by mouth 3 (three) times daily.     ibuprofen 800 MG tablet  Commonly known as: ADVIL,MOTRIN  Take 800 mg by mouth 3 (three) times daily as needed.     levothyroxine 137 MCG Tab tablet  Commonly known as: SYNTHROID  Take 137 mcg by mouth.     losartan 50 MG tablet  Commonly known as: COZAAR  Take 1 tablet (50 mg total) by mouth once daily.     meclizine 25 mg tablet  Commonly known as: ANTIVERT  Take 25 mg by mouth 3 (three) times daily as needed.     medroxyPROGESTERone 10 MG tablet  Commonly known as: PROVERA  Take 1 tablet (10 mg total) by mouth once daily. for 10 days     multivitamin per tablet  Commonly known as: THERAGRAN  Take 1 tablet by mouth once daily.     naproxen 500 MG tablet  Commonly known as: NAPROSYN  Take 1 tablet (500 mg total) by mouth 2 (two) times daily with meals. For pain and inflammation            BASIM Rodarte MD  Obstetrics & Gynecology  [facility]

## 2023-02-10 NOTE — NURSING TRANSFER
Assumed care of patient from ROBERTO Del Rosario. Patient vitals stable and resting comfortably in bed with no complaints. Plan of care reviewed with patient and family. will continue to monitor for discharge criteria

## 2023-02-10 NOTE — DISCHARGE INSTRUCTIONS
Discharge Instructions for Laparoscopic Hysterectomy  You had a procedure called laparoscopic hysterectomy. A surgeon removed your uterus using instruments inserted through small incisions in your abdomen. These incisions may be tender or sore. You may also have pain in your upper back or shoulders. This is from the gas used to enlarge your abdomen to allow your doctor to see inside your pelvis and perform the procedure. This pain usually goes away in a day or two. It usually takes from 1 to 4 weeks to recover from laparoscopic hysterectomy. Remember, though, that recovery time varies from woman to woman. Here's what you can do to speed your recovery following surgery.  Home care   Continue the coughing and deep breathing exercises that you learned in the hospital.  Take your medications exactly as directed by your doctor.  Avoid constipation.  Eat fruits, vegetables, and whole grains.  Drink 6 to 8 glasses of water a day, unless told to do otherwise.  Use a laxative or a mild stool softener if your doctor says it's OK.  Shower as usual. Wash your incisions with mild soap and water. Pat dry. Your incisions have Dermabond, which is a surgical glue, it will flake off in about 10 days.   Don't use oils, powders, or lotions on your incisions as it will dissolve the glue faster than we want.   Don't put anything in your vagina for 4-6 weeks. Don't use tampons or douches. Don't have sex.  If you had both ovaries removed, report hot flashes, mood swings, and irritability to your doctor. There may be medications that can help you.  Activity  Ask your doctor when you can start driving again. It's usually okay to drive as soon as you are free of pain and able to move comfortably from side to side. Don't drive while you are still taking opioid pain medications.  Ask others to help with chores and errands while you recover.  Dont lift anything heavier than 10 pounds for 6 weeks to avoid straining your incisions.  Dont vacuum  or do other strenuous activities until the doctor says it's OK.  Walk as often as you feel able.  Don't drive for a few days after the surgery. You may drive as soon as you are able to move comfortably from side to side and when you are no longer taking narcotics.  Climb stairs slowly and pause after every few steps.  Follow-up care  Make a follow-up appointment as directed by our staff.     When to call your doctor  Call your doctor right away if you have any of the following:  Fever above 100.4°F (38°C) or chills  Bright red vaginal bleeding or vaginal bleeding that soaks more than one sanitary pad per hour  A foul smelling discharge from the vagina  Trouble urinating or burning when you urinate  Severe pain or bloating in your abdomen  Redness, swelling, or drainage at your incision sites  Shortness of breath or chest pain  Nausea and vomiting

## 2023-02-10 NOTE — OP NOTE
[facility]  Surgery Department  Operative Note    SUMMARY     Date of Procedure: 2/10/2023     Procedure: Procedure(s) (LRB):  XI ROBOTIC HYSTERECTOMY,WITH SALPINGECTOMY (N/A)  EXCISION, MASS (N/A)     Surgeon(s) and Role:     * ERIK Rodarte MD - Primary    Assisting Surgeon:      Destini HDZ assistance deemed necessary by MD     Pre-Operative Diagnosis: DUB (dysfunctional uterine bleeding) [N93.8]  DRE (stress urinary incontinence, female) [N39.3]        Post-Operative Diagnosis: Post-Op Diagnosis Codes:     * DUB (dysfunctional uterine bleeding) [N93.8]     * DRE (stress urinary incontinence, female) [N39.3]   Solid Cervical Mass   Anesthesia: General    Operative Findings (including complications, if any):     Description of Technical Procedures:     Procedure in Detail/Findings:  Operative findings      Cervix with smooth 3 cm solid mass extending off broad base from the lower posterior cervical canal     Upper abdomen normal liver , no adhesions, no bowel abnormalities    Pelvis:  Uterus normal size and shape,  Ovaries and tubes normal ,                  No Adhesions, No endometriosis                 Ureters in there normal retroperitoneal position through the pelvis                 Bladder Flap with no adhesions         The patient was taken to the surgical suite.  General              intubation  anesthesia is induced.                                                                   The patient was placed in supine lithotomy  position  with low Francis stirrups                                                                   The vagina was prepped with Betadine.        Prior to placement of the manipulator, the cervical mass was excised with cautery     A Miranda     catheter, DORYS manipulator with  KOH colpotomizer placed.    Abdomen prepped   with  chlorhexidine solution                                                                Sterile drapes applied     Time-out  taken with all members of the operating team.       Veress  needle placed through the umbilicus, abdomen elevated with towel clamps.     CO2 gas insufflation to 15 mmHg.          Trochar placement as follows:       8 mm bladeless trochars:      4 trochars, transverse line 2 cm above the umbilicus, 8 cm apart with direct visualization after the initial placement                     The eGifter robotic XI   system docked from the right side of the patient.     .    Instrument placement as follows   Camera:  Right midline port   Unipolar Scissors:  Right lateral port   [place] Bipolar Forceps:  Left midline port   Fenestrated Forceps:  Left lateral port    The surgeon moved to the robotic console and the first assistant remained at the bedside for uterine manipulation and utilization of instruments placed through the left lateral trochar                                                                              Prior to starting the hysterectomy, the anatomical landmarks of the pelvis and the pelvic course of the ureters right and left are identified.   The Maryland bipolar is used for cautery and the scissors used for transection of the pedicles .  The  round ligaments are  Transected after cautery on each side and dissection of the broad ligament and development of the bladder flap is accomplished  with the unipolar scissors.   The mesosalpinx of the right and left fallopian tubes are cauterized with the Maryland forceps and incised with scissors to the level of the uterus     The anterior and posterior vagina entered over the colpotomizer with unipolar scissors and the colpotomy is extended over the colpotomizer anterior and posterior to the ascending uterine bilateral uterine arteries.    The Uterine vessels are then cauterized with the Maryland instrument and transected.     With the uterus free of blood supply and vaginal connection, it is removed with the cervix and bilateral tubes through the vagina.     Bleeding of the uterine pedicle or vaginal cuff is cauterized.   Closure of the vaginal cuff done with Zero Vicryl suture interrupted at each angle followed by absorbable 2 zero V lock double layer running        All needles used in the abdomen removed through the vagina or  the 8 mm trocar on the left are  accounted for.       Good hemostasis was  Appreciated in the operative field, any bleeding noted cauterized with bipolar instrument     Any excess fluid was suctioned free.    After the instruments were removed, the da Gasper robotic system was undocked        All trochar  sites closed with 4-0 Monocryl subcuticular and Dermabond.       The specimen removed from the vagina.  Vagina examined for any tears.       The  patient taken to Recovery in stable condition with linder catheter in place               Significant Surgical Tasks Conducted by the Assistant(s), if Applicable: First assist bedside and trochar site closure     Estimated Blood Loss (EBL): 30cc           Implants: * No implants in log *    Specimens:   Specimen (24h ago, onward)       Start     Ordered    02/10/23 0757  Specimen to Pathology, Surgery Gynecology and Obstetrics  Once        Comments: Pre-op Diagnosis: DUB (dysfunctional uterine bleeding) [N93.8]DRE (stress urinary incontinence, female) [N39.3]Procedure(s):XI ROBOTIC HYSTERECTOMY possible Lynne Number of specimens: 2Name of specimens: 1. Cervical mass2. Cervix, uterus, bilateral fallopian tubes     References:    Click here for ordering Quick Tip   Question Answer Comment   Procedure Type: Gynecology and Obstetrics    Specimen Class: Routine/Screening    Which provider would you like to cc? ERIK PERLA JOSE CARLOS    Release to patient Immediate        02/10/23 0756    02/10/23 0740  Specimen to Pathology, Surgery Gynecology and Obstetrics  Once,   Status:  Canceled        Comments: Pre-op Diagnosis: DUB (dysfunctional uterine bleeding) [N93.8]DRE (stress urinary incontinence, female)  [N39.3]Procedure(s):XI ROBOTIC HYSTERECTOMY possible Lynne Number of specimens: 1Name of specimens: 1. Cervical mass     References:    Click here for ordering Quick Tip   Question Answer Comment   Procedure Type: Gynecology and Obstetrics    Specimen Class: Routine/Screening    Which provider would you like to cc? ERIK PERLA    Release to patient Immediate        02/10/23 0740                            Condition: Good    Disposition: PACU - hemodynamically stable.    Attestation: I was present and scrubbed for the entire procedure.

## 2023-02-10 NOTE — PLAN OF CARE
Patient met criteria for discharge. Discharge instructions given, and medications delievered picked up by spouse. Patient dressed and wheeled to car by RN with no complaints.

## 2023-02-10 NOTE — INTERVAL H&P NOTE
The patient has been examined and the H&P has been reviewed:    I concur with the findings and no changes have occurred since H&P was written.    Surgery risks, benefits and alternative options discussed and understood by patient/family.    Note normal uterine size on gyn exam   Patient did not have urodynamic study done as planned   Will not proceed with urethropexy because of concern of neurogenic bladder from MS as cause of urge symptoms             There are no hospital problems to display for this patient.

## 2023-02-15 LAB
FINAL PATHOLOGIC DIAGNOSIS: NORMAL
Lab: NORMAL

## 2023-02-20 ENCOUNTER — PATIENT MESSAGE (OUTPATIENT)
Dept: PSYCHIATRY | Facility: CLINIC | Age: 48
End: 2023-02-20
Payer: COMMERCIAL

## 2023-02-27 ENCOUNTER — TELEPHONE (OUTPATIENT)
Dept: UROLOGY | Facility: CLINIC | Age: 48
End: 2023-02-27
Payer: COMMERCIAL

## 2023-02-27 NOTE — TELEPHONE ENCOUNTER
Appt was rescheduled     ----- Message from Dominique Junior sent at 2/27/2023  7:40 AM CST -----  Contact: Nisha  Patient is calling to speak with nurse regarding questions and concerns. Reports wanting to ask a few questions before procedure today. Please give patient a call back at [phone].

## 2023-02-27 NOTE — TELEPHONE ENCOUNTER
Pt requested to have her appt rescheduled in 16 weeks due to having a recent hysterectomy. I offered the pt Monday, May 22, @ 1:00 pm. Pt accepted the appt

## 2023-03-08 ENCOUNTER — OFFICE VISIT (OUTPATIENT)
Dept: OBSTETRICS AND GYNECOLOGY | Facility: CLINIC | Age: 48
End: 2023-03-08
Payer: COMMERCIAL

## 2023-03-08 VITALS
WEIGHT: 172.63 LBS | SYSTOLIC BLOOD PRESSURE: 126 MMHG | HEIGHT: 65 IN | DIASTOLIC BLOOD PRESSURE: 72 MMHG | BODY MASS INDEX: 28.76 KG/M2

## 2023-03-08 DIAGNOSIS — Z90.710 STATUS POST LAPAROSCOPIC HYSTERECTOMY: Primary | ICD-10-CM

## 2023-03-08 PROBLEM — N88.8 CERVICAL MASS: Status: RESOLVED | Noted: 2023-02-10 | Resolved: 2023-03-08

## 2023-03-08 PROCEDURE — 1159F PR MEDICATION LIST DOCUMENTED IN MEDICAL RECORD: ICD-10-PCS | Mod: CPTII,S$GLB,, | Performed by: OBSTETRICS & GYNECOLOGY

## 2023-03-08 PROCEDURE — 1159F MED LIST DOCD IN RCRD: CPT | Mod: CPTII,S$GLB,, | Performed by: OBSTETRICS & GYNECOLOGY

## 2023-03-08 PROCEDURE — 99999 PR PBB SHADOW E&M-EST. PATIENT-LVL III: CPT | Mod: PBBFAC,,, | Performed by: OBSTETRICS & GYNECOLOGY

## 2023-03-08 PROCEDURE — 3074F SYST BP LT 130 MM HG: CPT | Mod: CPTII,S$GLB,, | Performed by: OBSTETRICS & GYNECOLOGY

## 2023-03-08 PROCEDURE — 3008F BODY MASS INDEX DOCD: CPT | Mod: CPTII,S$GLB,, | Performed by: OBSTETRICS & GYNECOLOGY

## 2023-03-08 PROCEDURE — 99024 PR POST-OP FOLLOW-UP VISIT: ICD-10-PCS | Mod: S$GLB,,, | Performed by: OBSTETRICS & GYNECOLOGY

## 2023-03-08 PROCEDURE — 3078F DIAST BP <80 MM HG: CPT | Mod: CPTII,S$GLB,, | Performed by: OBSTETRICS & GYNECOLOGY

## 2023-03-08 PROCEDURE — 99024 POSTOP FOLLOW-UP VISIT: CPT | Mod: S$GLB,,, | Performed by: OBSTETRICS & GYNECOLOGY

## 2023-03-08 PROCEDURE — 3078F PR MOST RECENT DIASTOLIC BLOOD PRESSURE < 80 MM HG: ICD-10-PCS | Mod: CPTII,S$GLB,, | Performed by: OBSTETRICS & GYNECOLOGY

## 2023-03-08 PROCEDURE — 3008F PR BODY MASS INDEX (BMI) DOCUMENTED: ICD-10-PCS | Mod: CPTII,S$GLB,, | Performed by: OBSTETRICS & GYNECOLOGY

## 2023-03-08 PROCEDURE — 3074F PR MOST RECENT SYSTOLIC BLOOD PRESSURE < 130 MM HG: ICD-10-PCS | Mod: CPTII,S$GLB,, | Performed by: OBSTETRICS & GYNECOLOGY

## 2023-03-08 PROCEDURE — 99999 PR PBB SHADOW E&M-EST. PATIENT-LVL III: ICD-10-PCS | Mod: PBBFAC,,, | Performed by: OBSTETRICS & GYNECOLOGY

## 2023-03-08 NOTE — PROGRESS NOTES
"Subjective:      Nisha Mancuso is a 47 y.o. female who presents to the clinic 4 weeks status post Davinci assisted hysterectomy and bilateral salpingectomy for abnormal uterine bleeding and fibroids. Eating a regular diet without difficulty. Bowel movements are normal. The patient is not having any pain.    The following portions of the patient's history were reviewed and updated as appropriate: allergies, current medications, past family history, past medical history, past social history, past surgical history, and problem list.    Review of Systems  Pertinent items are noted in HPI.      Objective:     /72   Ht 5' 5" (1.651 m)   Wt 78.3 kg (172 lb 9.9 oz)   LMP  (LMP Unknown)   BMI 28.73 kg/m²   General:  alert, appears stated age, and cooperative   Abdomen: soft, bowel sounds active, non-tender   Incision:   healing well, no drainage, no erythema, no hernia, no seroma, no swelling, no dehiscence, incision well approximated     Assessment:      Doing well postoperatively.  Operative findings again reviewed. Pathology report discussed.      Plan:      1. Continue any current medications.  2. Wound care discussed.  3. Activity restrictions:  Pelvic rest for 12 weeks total   4. Anticipated return to work: now.  5. Follow up: 2 year   pelvic   No pap     "

## 2023-04-04 ENCOUNTER — OFFICE VISIT (OUTPATIENT)
Dept: INTERNAL MEDICINE | Facility: CLINIC | Age: 48
End: 2023-04-04
Payer: COMMERCIAL

## 2023-04-04 DIAGNOSIS — G89.29 CHRONIC PAIN OF RIGHT KNEE: Primary | ICD-10-CM

## 2023-04-04 DIAGNOSIS — M25.561 CHRONIC PAIN OF RIGHT KNEE: Primary | ICD-10-CM

## 2023-04-04 PROCEDURE — 99214 PR OFFICE/OUTPT VISIT, EST, LEVL IV, 30-39 MIN: ICD-10-PCS | Mod: 95,,, | Performed by: INTERNAL MEDICINE

## 2023-04-04 PROCEDURE — 99214 OFFICE O/P EST MOD 30 MIN: CPT | Mod: 95,,, | Performed by: INTERNAL MEDICINE

## 2023-04-04 PROCEDURE — 4010F ACE/ARB THERAPY RXD/TAKEN: CPT | Mod: CPTII,95,, | Performed by: INTERNAL MEDICINE

## 2023-04-04 PROCEDURE — 4010F PR ACE/ARB THEARPY RXD/TAKEN: ICD-10-PCS | Mod: CPTII,95,, | Performed by: INTERNAL MEDICINE

## 2023-04-04 RX ORDER — NAPROXEN 500 MG/1
500 TABLET ORAL 2 TIMES DAILY WITH MEALS
Qty: 15 TABLET | Refills: 0 | Status: SHIPPED | OUTPATIENT
Start: 2023-04-04 | End: 2023-05-12 | Stop reason: SDUPTHER

## 2023-04-04 NOTE — PROGRESS NOTES
Subjective:      Patient ID: Nisha Mancuso is a 47 y.o. female.    Chief Complaint: No chief complaint on file.    HPI    The patient location is: [place]  The chief complaint leading to consultation is: knee pain    Visit type:  Audiovisual    Face to Face time with patient: 15   minutes of total time spent on the encounter, which includes face to face time and non-face to face time preparing to see the patient (eg, review of tests), Obtaining and/or reviewing separately obtained history, Documenting clinical information in the electronic or other health record, Independently interpreting results (not separately reported) and communicating results to the patient/family/caregiver, or Care coordination (not separately reported).         Each patient to whom he or she provides medical services by telemedicine is:  (1) informed of the relationship between the physician and patient and the respective role of any other health care provider with respect to management of the patient; and (2) notified that he or she may decline to receive medical services by telemedicine and may withdraw from such care at any time.    Notes:     C/o  Right knee pain 2 to 3 months. Worse with extension. No redness or warmth. Intermittent edema. No recent injury but but does admit to increased walking while on a trip to [place] in January.    Review of Systems   Constitutional:  Negative for activity change and unexpected weight change.   HENT:  Negative for hearing loss, rhinorrhea and trouble swallowing.    Eyes:  Negative for discharge and visual disturbance.   Respiratory:  Negative for chest tightness and wheezing.    Cardiovascular:  Negative for chest pain and palpitations.   Gastrointestinal:  Negative for blood in stool, constipation, diarrhea and vomiting.   Endocrine: Negative for polydipsia and polyuria.   Genitourinary:  Negative for difficulty urinating, dysuria, hematuria and menstrual problem.   Musculoskeletal:  Negative  for arthralgias, joint swelling and neck pain.   Neurological:  Negative for weakness and headaches.   Psychiatric/Behavioral:  Negative for confusion and dysphoric mood.    Objective:   LMP  (LMP Unknown)     Physical Exam  Constitutional:       General: She is awake.      Appearance: Normal appearance.   HENT:      Head: Normocephalic and atraumatic.   Eyes:      Conjunctiva/sclera: Conjunctivae normal.   Pulmonary:      Effort: Pulmonary effort is normal.   Musculoskeletal:      Cervical back: Normal range of motion.   Neurological:      Mental Status: She is alert. Mental status is at baseline.   Psychiatric:         Mood and Affect: Mood normal.         Behavior: Behavior normal. Behavior is cooperative.         Thought Content: Thought content normal.         Judgment: Judgment normal.     No redness noted to right knee on video visit.  Questionable mild edema.    Lab Results   Component Value Date    WBC 6.66 02/03/2023    HGB 10.2 (L) 02/03/2023    HGB 11.3 (L) 06/18/2022    HGB 11.7 (L) 06/04/2022    HCT 32.2 (L) 02/03/2023    MCV 87 02/03/2023    MCV 97 06/18/2022    MCV 94 06/04/2022     (H) 02/03/2023    CHOL 211 (H) 05/24/2022    TRIG 114 05/24/2022    HDL 65 05/24/2022    LDLCALC 123.2 05/24/2022    LDLCALC 133.0 10/21/2019    ALT 19 02/03/2023    AST 18 02/03/2023     02/03/2023    K 4.2 02/03/2023     02/03/2023    CO2 22 (L) 02/03/2023    BUN 16 02/03/2023    CREATININE 0.8 02/03/2023    CREATININE 0.7 05/24/2022    CREATININE 0.7 12/18/2019    EGFRNORACEVR >60 02/03/2023    TSH 0.830 05/24/2022    TSH 1.805 05/18/2022    TSH 9.438 (H) 12/11/2020     02/03/2023    HGBA1C 4.9 05/24/2022    HGBA1C 5.2 10/21/2019          The 10-year ASCVD risk score (Yuniel VELIZ, et al., 2019) is: 1.3%    Values used to calculate the score:      Age: 47 years      Sex: Female      Is Non- : No      Diabetic: No      Tobacco smoker: No      Systolic Blood Pressure: 126  mmHg      Is BP treated: Yes      HDL Cholesterol: 65 mg/dL      Total Cholesterol: 211 mg/dL     Assessment:     1. Chronic pain of right knee      Plan:   1. Chronic pain of right knee  -     X-Ray Knee Complete 4 Or More Views Right; Future; Expected date: 04/04/2023  -     Ambulatory referral/consult to Orthopedics; Future; Expected date: 04/11/2023  -     naproxen (NAPROSYN) 500 MG tablet; Take 1 tablet (500 mg total) by mouth 2 (two) times daily with meals. For pain and inflammation  Dispense: 15 tablet; Refill: 0        There are no Patient Instructions on file for this visit.    Future Appointments   Date Time Provider Department Center   5/22/2023  1:00 PM URODYNAMICS, [facility]   6/26/2023 11:15 AM Rachelle Godoy MD [facility]       Lab Frequency Next Occurrence   Mammo Digital Screening Bilat w/ Tony Once 05/24/2022   US Pelvis Complete Non OB Once 05/24/2022   Lipid Panel Once 02/20/2023   Hemoglobin A1C Once 02/20/2023   Comprehensive Metabolic Panel Once 02/20/2023   CBC Auto Differential Once 02/20/2023   Basic Metabolic Panel Once 11/28/2022   CBC Auto Differential Once 11/28/2022       Follow up in about 2 months (around 6/4/2023).  Patient needs to schedule labs previously ordered by me in the end of May of 2024.  Needs to see me in clinic end of May or early June.

## 2023-04-06 ENCOUNTER — PATIENT MESSAGE (OUTPATIENT)
Dept: INTERNAL MEDICINE | Facility: CLINIC | Age: 48
End: 2023-04-06
Payer: COMMERCIAL

## 2023-04-10 ENCOUNTER — PATIENT MESSAGE (OUTPATIENT)
Dept: ORTHOPEDICS | Facility: CLINIC | Age: 48
End: 2023-04-10
Payer: COMMERCIAL

## 2023-04-11 ENCOUNTER — OFFICE VISIT (OUTPATIENT)
Dept: ORTHOPEDICS | Facility: CLINIC | Age: 48
End: 2023-04-11
Payer: COMMERCIAL

## 2023-04-11 ENCOUNTER — HOSPITAL ENCOUNTER (OUTPATIENT)
Dept: RADIOLOGY | Facility: HOSPITAL | Age: 48
Discharge: HOME | End: 2023-04-11
Attending: INTERNAL MEDICINE
Payer: COMMERCIAL

## 2023-04-11 VITALS — BODY MASS INDEX: 28.76 KG/M2 | WEIGHT: 172.63 LBS | HEIGHT: 65 IN

## 2023-04-11 DIAGNOSIS — G89.29 CHRONIC PAIN OF RIGHT KNEE: ICD-10-CM

## 2023-04-11 DIAGNOSIS — G89.29 CHRONIC PAIN OF RIGHT KNEE: Primary | ICD-10-CM

## 2023-04-11 DIAGNOSIS — M25.461 EFFUSION OF RIGHT KNEE: ICD-10-CM

## 2023-04-11 DIAGNOSIS — M25.561 CHRONIC PAIN OF RIGHT KNEE: ICD-10-CM

## 2023-04-11 DIAGNOSIS — M25.561 CHRONIC PAIN OF RIGHT KNEE: Primary | ICD-10-CM

## 2023-04-11 DIAGNOSIS — G35 MS (MULTIPLE SCLEROSIS): ICD-10-CM

## 2023-04-11 PROCEDURE — 99204 PR OFFICE/OUTPT VISIT, NEW, LEVL IV, 45-59 MIN: ICD-10-PCS | Mod: S$GLB,,, | Performed by: ORTHOPAEDIC SURGERY

## 2023-04-11 PROCEDURE — 1159F MED LIST DOCD IN RCRD: CPT | Mod: CPTII,S$GLB,, | Performed by: ORTHOPAEDIC SURGERY

## 2023-04-11 PROCEDURE — 3008F BODY MASS INDEX DOCD: CPT | Mod: CPTII,S$GLB,, | Performed by: ORTHOPAEDIC SURGERY

## 2023-04-11 PROCEDURE — 73562 X-RAY EXAM OF KNEE 3: CPT | Mod: 26,LT,, | Performed by: RADIOLOGY

## 2023-04-11 PROCEDURE — 73562 XR KNEE ORTHO RIGHT WITH FLEXION: ICD-10-PCS | Mod: 26,LT,, | Performed by: RADIOLOGY

## 2023-04-11 PROCEDURE — 99999 PR PBB SHADOW E&M-EST. PATIENT-LVL IV: ICD-10-PCS | Mod: PBBFAC,,, | Performed by: ORTHOPAEDIC SURGERY

## 2023-04-11 PROCEDURE — 73564 X-RAY EXAM KNEE 4 OR MORE: CPT | Mod: TC,RT

## 2023-04-11 PROCEDURE — 4010F PR ACE/ARB THEARPY RXD/TAKEN: ICD-10-PCS | Mod: CPTII,S$GLB,, | Performed by: ORTHOPAEDIC SURGERY

## 2023-04-11 PROCEDURE — 99999 PR PBB SHADOW E&M-EST. PATIENT-LVL IV: CPT | Mod: PBBFAC,,, | Performed by: ORTHOPAEDIC SURGERY

## 2023-04-11 PROCEDURE — 73564 X-RAY EXAM KNEE 4 OR MORE: CPT | Mod: 26,RT,, | Performed by: RADIOLOGY

## 2023-04-11 PROCEDURE — 3008F PR BODY MASS INDEX (BMI) DOCUMENTED: ICD-10-PCS | Mod: CPTII,S$GLB,, | Performed by: ORTHOPAEDIC SURGERY

## 2023-04-11 PROCEDURE — 73564 XR KNEE ORTHO RIGHT WITH FLEXION: ICD-10-PCS | Mod: 26,RT,, | Performed by: RADIOLOGY

## 2023-04-11 PROCEDURE — 4010F ACE/ARB THERAPY RXD/TAKEN: CPT | Mod: CPTII,S$GLB,, | Performed by: ORTHOPAEDIC SURGERY

## 2023-04-11 PROCEDURE — 1159F PR MEDICATION LIST DOCUMENTED IN MEDICAL RECORD: ICD-10-PCS | Mod: CPTII,S$GLB,, | Performed by: ORTHOPAEDIC SURGERY

## 2023-04-11 PROCEDURE — 99204 OFFICE O/P NEW MOD 45 MIN: CPT | Mod: S$GLB,,, | Performed by: ORTHOPAEDIC SURGERY

## 2023-04-11 RX ORDER — ZOSTER VACCINE RECOMBINANT, ADJUVANTED 50 MCG/0.5
KIT INTRAMUSCULAR
COMMUNITY
Start: 2022-12-09 | End: 2023-05-31

## 2023-04-11 NOTE — PATIENT INSTRUCTIONS
Assessment:  Nisha Mancuso is a  47 y.o. female    with a chief complaint of Pain of the Right Knee    Injury in July 2022  Chronic pain of right knee  Effusion   Hx of MS    Encounter Diagnoses   Name Primary?    Chronic pain of right knee Yes    MS (multiple sclerosis)     Effusion of right knee       Plan:  MRI of right knee to evaluate mensicus and cartilage      Follow-up: AFTER MRI or sooner if there are any problems between now and then.    Leave Review:   Google: Leave Google Review  Healthgrades: Leave Healthgrades Review    After Hours Number: [phone]

## 2023-04-11 NOTE — PROGRESS NOTES
Patient ID: Nisha Mancuso  YOB: 1975  MRN: [id number]    Chief Complaint: Pain of the Right Knee    Referred By: Dr. Apple     History of Present Illness: Nisha Mancuso is a  47 y.o. female    with a chief complaint of Pain of the Right Knee    The patient presents with right knee pain. She reports an initial injury in January of 2022. She was evaluated by Dr. Apple, but the pain has now gotten worst. She takes Naproxen 500 as needed for the pain She has not had any physical therapy or injections. She reports the pain has gotten worst and she has trouble straightening her knee. She reports she had trouble with long walking that she had to do in [place] in January.     HPI    Past Medical History:   Past Medical History:   Diagnosis Date    Hypertension     Multiple sclerosis     Thyroid cancer     Thyroid cancer 2011     Past Surgical History:   Procedure Laterality Date    COLONOSCOPY N/A 10/14/2022    Procedure: COLONOSCOPY;  Surgeon: Geetha Alonso MD;  Location: [facility];  Service: Endoscopy;  Laterality: N/A;    EXCISION, MASS N/A 2/10/2023    Procedure: EXCISION, MASS;  Surgeon: ERIK Rodarte MD;  Location: [facility] OR;  Service: OB/GYN;  Laterality: N/A;    THYROIDECTOMY      XI ROBOTIC HYSTERECTOMY, WITH SALPINGECTOMY N/A 2/10/2023    Procedure: XI ROBOTIC HYSTERECTOMY,WITH SALPINGECTOMY;  Surgeon: ERIK Rodarte MD;  Location: [facility] OR;  Service: OB/GYN;  Laterality: N/A;     Family History   Problem Relation Age of Onset    Arthritis Mother     Hypertension Mother     Hypertension Father     No Known Problems Sister     No Known Problems Sister     No Known Problems Brother     Heart defect Maternal Grandfather     No Known Problems Maternal Grandmother     No Known Problems Paternal Grandfather     No Known Problems Paternal Grandmother     Breast cancer Neg Hx     Cancer Neg Hx     Colon cancer Neg Hx     Ovarian cancer Neg Hx      Social History      Socioeconomic History    Marital status:    Tobacco Use    Smoking status: Never    Smokeless tobacco: Never   Substance and Sexual Activity    Alcohol use: Yes     Comment: socially    Drug use: Never    Sexual activity: Yes     Medication List with Changes/Refills   Current Medications    AUBAGIO 14 MG TAB    Take 1 tablet by mouth once daily.    BACLOFEN (LIORESAL) 20 MG TABLET    TAKE ONE TABLET BY MOUTH EVERY DAY    CARBAMAZEPINE (TEGRETOL) 200 MG TABLET    Take 200 mg by mouth daily as needed.    FERROUS SULFATE (FEOSOL) TAB TABLET    Take 1 tablet by mouth daily with breakfast.    GABAPENTIN (NEURONTIN) 300 MG CAPSULE    Take 1 capsule (300 mg total) by mouth 3 (three) times daily.    IBUPROFEN (ADVIL,MOTRIN) 800 MG TABLET    Take 800 mg by mouth 3 (three) times daily as needed.    LEVOTHYROXINE (SYNTHROID) 137 MCG TAB TABLET    Take 137 mcg by mouth.    LOSARTAN (COZAAR) 50 MG TABLET    Take 1 tablet by mouth once daily.    MECLIZINE (ANTIVERT) 25 MG TABLET    Take 25 mg by mouth 3 (three) times daily as needed.    MEDROXYPROGESTERONE (PROVERA) 10 MG TABLET    Take 1 tablet (10 mg total) by mouth once daily. for 10 days    MULTIVITAMIN (THERAGRAN) PER TABLET    Take 1 tablet by mouth once daily.    NAPROXEN (NAPROSYN) 500 MG TABLET    Take 1 tablet (500 mg total) by mouth 2 (two) times daily with meals. For pain and inflammation    SHINGRIX, PF, 50 MCG/0.5 ML INJECTION         Review of patient's allergies indicates:  No Known Allergies  ROS    Physical Exam:   Body mass index is 28.73 kg/m².  There were no vitals filed for this visit.   GENERAL: Well appearing, appropriate for stated age, no acute distress.  CARDIOVASCULAR: Pulses regular by peripheral palpation.  PULMONARY: Respirations are even and non-labored.  NEURO: Awake, alert, and oriented x 3.  PSYCH: Mood & affect are appropriate.  HEENT: Head is normocephalic and atraumatic.            Right Knee Exam     Inspection   Effusion:  present    Tenderness   The patient is tender to palpation of the medial joint line and lateral joint line.    Range of Motion   Extension:  0 (pain with terminal extension)   Flexion:  110     Tests   Ligament Examination   Lachman: normal (-1 to 2mm)   MCL - Valgus: normal (0 to 2mm)  LCL - Varus: normal    Other   Sensation: normal    Comments:  Pain with Edel     Vascular Exam     Right Pulses  Dorsalis Pedis:      2+  Posterior Tibial:      2+        Imaging:    X-ray Knee Ortho Right with Flexion  Narrative: EXAMINATION:  XR KNEE ORTHO RIGHT WITH FLEXION    CLINICAL HISTORY:  Pain in right knee    TECHNIQUE:  AP standing as well as PA flexion standing and Merchant views of both knees were performed.  A lateral view of the right knee is also performed.    COMPARISON:  06/17/2022    FINDINGS:  No acute osseous abnormality or significant arthritic change.  Tiny right suprapatellar joint effusion..  Impression: Above    Electronically signed by: Iraj Hall MD  Date:    04/11/2023  Time:    13:50      Relevant imaging results reviewed and interpreted by me, discussed with the patient and / or family today.     Other Tests:         Patient Instructions   Assessment:  Nisha Mancuso is a  47 y.o. female    with a chief complaint of Pain of the Right Knee    Injury in July 2022  Chronic pain of right knee  Effusion   Hx of MS    Encounter Diagnoses   Name Primary?    Chronic pain of right knee Yes    MS (multiple sclerosis)     Effusion of right knee       Plan:  MRI of right knee to evaluate mensicus and cartilage      Follow-up: AFTER MRI or sooner if there are any problems between now and then.    Leave Review:   Google: Leave Google Review  Healthgrades: Leave Healthgrades Review    After Hours Number: [phone]      Provider Note/Medical Decision Making:       I discussed worrisome and red flag signs and symptoms with the patient. The patient expressed understanding and agreed to  alert me immediately or to go to the emergency room if they experience any of these.   Treatment plan was developed with input from the patient/family, and they expressed understanding and agreement with the plan. All questions were answered today.          Nickolas Wolf MD  Orthopaedic Surgery & Sports Medicine       Disclaimer: This note was prepared using a voice recognition system and is likely to have sound alike errors within the text.     I, Margarita Rice, acted as a scribe for Nickolas Wolf MD for the duration of this office visit.

## 2023-04-18 ENCOUNTER — TELEPHONE (OUTPATIENT)
Dept: ORTHOPEDICS | Facility: CLINIC | Age: 48
End: 2023-04-18
Payer: COMMERCIAL

## 2023-04-18 ENCOUNTER — PATIENT MESSAGE (OUTPATIENT)
Dept: ORTHOPEDICS | Facility: CLINIC | Age: 48
End: 2023-04-18
Payer: COMMERCIAL

## 2023-04-18 DIAGNOSIS — G89.29 CHRONIC PAIN OF RIGHT KNEE: Primary | ICD-10-CM

## 2023-04-18 DIAGNOSIS — M25.461 EFFUSION OF RIGHT KNEE: ICD-10-CM

## 2023-04-18 DIAGNOSIS — M25.561 CHRONIC PAIN OF RIGHT KNEE: Primary | ICD-10-CM

## 2023-04-18 NOTE — TELEPHONE ENCOUNTER
Spoke with Patients daughter. Tried calling cell phone, no answer and unable to leave voicemail. Patients daughter thinks its about moving to external MRI. But will have her call us back.    ----- Message from Cindy Up sent at 4/18/2023 11:15 AM CDT -----  Pt would like a call back regarding the MRI, she have some questions. Call back number is .[phone]. Thx .EL

## 2023-04-19 ENCOUNTER — HOSPITAL ENCOUNTER (OUTPATIENT)
Dept: RADIOLOGY | Facility: HOSPITAL | Age: 48
Discharge: HOME | End: 2023-04-19
Attending: ORTHOPAEDIC SURGERY
Payer: COMMERCIAL

## 2023-04-19 ENCOUNTER — PATIENT MESSAGE (OUTPATIENT)
Dept: ORTHOPEDICS | Facility: CLINIC | Age: 48
End: 2023-04-19
Payer: COMMERCIAL

## 2023-04-19 DIAGNOSIS — M25.561 CHRONIC PAIN OF RIGHT KNEE: ICD-10-CM

## 2023-04-19 DIAGNOSIS — G89.29 CHRONIC PAIN OF RIGHT KNEE: ICD-10-CM

## 2023-04-19 PROCEDURE — 73721 MRI JNT OF LWR EXTRE W/O DYE: CPT | Mod: 26,RT,, | Performed by: STUDENT IN AN ORGANIZED HEALTH CARE EDUCATION/TRAINING PROGRAM

## 2023-04-19 PROCEDURE — 73721 MRI JNT OF LWR EXTRE W/O DYE: CPT | Mod: TC,RT

## 2023-04-19 PROCEDURE — 73721 MRI KNEE WITHOUT CONTRAST RIGHT: ICD-10-PCS | Mod: 26,RT,, | Performed by: STUDENT IN AN ORGANIZED HEALTH CARE EDUCATION/TRAINING PROGRAM

## 2023-04-24 ENCOUNTER — TELEPHONE (OUTPATIENT)
Dept: SPORTS MEDICINE | Facility: CLINIC | Age: 48
End: 2023-04-24
Payer: COMMERCIAL

## 2023-04-24 NOTE — TELEPHONE ENCOUNTER
Called to get ordered removed from [facility].       ----- Message from Samira Alvarez sent at 4/24/2023  9:47 AM CDT -----  Contact: [facility](Shawna)-[phone]  Shawna from [facility] is in need of the authorization from the patients insurance faxed over to them at [phone]. Please call Shawna back at [phone] or [phone]

## 2023-04-25 ENCOUNTER — PATIENT MESSAGE (OUTPATIENT)
Dept: ORTHOPEDICS | Facility: CLINIC | Age: 48
End: 2023-04-25
Payer: COMMERCIAL

## 2023-05-12 ENCOUNTER — OFFICE VISIT (OUTPATIENT)
Dept: SPORTS MEDICINE | Facility: CLINIC | Age: 48
End: 2023-05-12
Payer: COMMERCIAL

## 2023-05-12 VITALS — BODY MASS INDEX: 28.82 KG/M2 | WEIGHT: 173 LBS | HEIGHT: 65 IN

## 2023-05-12 DIAGNOSIS — M23.203 OLD TEAR OF MEDIAL MENISCUS OF RIGHT KNEE, UNSPECIFIED TEAR TYPE: ICD-10-CM

## 2023-05-12 DIAGNOSIS — M25.561 RIGHT MEDIAL KNEE PAIN: Primary | ICD-10-CM

## 2023-05-12 PROCEDURE — 99214 PR OFFICE/OUTPT VISIT, EST, LEVL IV, 30-39 MIN: ICD-10-PCS | Mod: 25,S$GLB,, | Performed by: ORTHOPAEDIC SURGERY

## 2023-05-12 PROCEDURE — 3008F BODY MASS INDEX DOCD: CPT | Mod: CPTII,S$GLB,, | Performed by: ORTHOPAEDIC SURGERY

## 2023-05-12 PROCEDURE — 97760 ORTHOTIC MGMT&TRAING 1ST ENC: CPT | Mod: GP,S$GLB,, | Performed by: ORTHOPAEDIC SURGERY

## 2023-05-12 PROCEDURE — 3008F PR BODY MASS INDEX (BMI) DOCUMENTED: ICD-10-PCS | Mod: CPTII,S$GLB,, | Performed by: ORTHOPAEDIC SURGERY

## 2023-05-12 PROCEDURE — 97760 PR ORTHOTIC MGMT&TRAINJ INITIAL ENC EA 15 MINS: ICD-10-PCS | Mod: GP,S$GLB,, | Performed by: ORTHOPAEDIC SURGERY

## 2023-05-12 PROCEDURE — 4010F PR ACE/ARB THEARPY RXD/TAKEN: ICD-10-PCS | Mod: CPTII,S$GLB,, | Performed by: ORTHOPAEDIC SURGERY

## 2023-05-12 PROCEDURE — 99999 PR PBB SHADOW E&M-EST. PATIENT-LVL IV: ICD-10-PCS | Mod: PBBFAC,,, | Performed by: ORTHOPAEDIC SURGERY

## 2023-05-12 PROCEDURE — 20610 DRAIN/INJ JOINT/BURSA W/O US: CPT | Mod: RT,S$GLB,, | Performed by: ORTHOPAEDIC SURGERY

## 2023-05-12 PROCEDURE — 4010F ACE/ARB THERAPY RXD/TAKEN: CPT | Mod: CPTII,S$GLB,, | Performed by: ORTHOPAEDIC SURGERY

## 2023-05-12 PROCEDURE — 99999 PR PBB SHADOW E&M-EST. PATIENT-LVL IV: CPT | Mod: PBBFAC,,, | Performed by: ORTHOPAEDIC SURGERY

## 2023-05-12 PROCEDURE — 99214 OFFICE O/P EST MOD 30 MIN: CPT | Mod: 25,S$GLB,, | Performed by: ORTHOPAEDIC SURGERY

## 2023-05-12 PROCEDURE — 20610 LARGE JOINT ASPIRATION/INJECTION: R KNEE: ICD-10-PCS | Mod: RT,S$GLB,, | Performed by: ORTHOPAEDIC SURGERY

## 2023-05-12 PROCEDURE — 1159F PR MEDICATION LIST DOCUMENTED IN MEDICAL RECORD: ICD-10-PCS | Mod: CPTII,S$GLB,, | Performed by: ORTHOPAEDIC SURGERY

## 2023-05-12 PROCEDURE — 1159F MED LIST DOCD IN RCRD: CPT | Mod: CPTII,S$GLB,, | Performed by: ORTHOPAEDIC SURGERY

## 2023-05-12 RX ADMIN — METHYLPREDNISOLONE ACETATE 80 MG: 80 INJECTION, SUSPENSION INTRA-ARTICULAR; INTRALESIONAL; INTRAMUSCULAR; SOFT TISSUE at 09:05

## 2023-05-12 NOTE — PROGRESS NOTES
Patient ID: Nisha Mancuso  YOB: 1975  MRN: [id number]    Chief Complaint: Pain of the Right Knee    Referred By: Kit Apple MD    History of Present Illness: Nisha Mancuso is a  47 y.o. female    with a chief complaint of Pain of the Right Knee    Patient ishere today for Right knee MRI review. She reports that the pain has persisted with intermittent aching, throbbing pain. Her pain is aggravated by full knee extension, prolonged walking/standing, and use of stairs. She reports catching in her knee when she is walking and her knee fully extends. She reports use of naproxen 500mg as prescribed by her PCP, but she has ran out of her medication.     HPI    Past Medical History:   Past Medical History:   Diagnosis Date    Hypertension     Multiple sclerosis     Thyroid cancer     Thyroid cancer 2011     Past Surgical History:   Procedure Laterality Date    COLONOSCOPY N/A 10/14/2022    Procedure: COLONOSCOPY;  Surgeon: Geetha Alonso MD;  Location: [facility];  Service: Endoscopy;  Laterality: N/A;    EXCISION, MASS N/A 2/10/2023    Procedure: EXCISION, MASS;  Surgeon: ERIK Rodarte MD;  Location: [facility] OR;  Service: OB/GYN;  Laterality: N/A;    THYROIDECTOMY      XI ROBOTIC HYSTERECTOMY, WITH SALPINGECTOMY N/A 2/10/2023    Procedure: XI ROBOTIC HYSTERECTOMY,WITH SALPINGECTOMY;  Surgeon: ERIK Rodarte MD;  Location: [facility] OR;  Service: OB/GYN;  Laterality: N/A;     Family History   Problem Relation Age of Onset    Arthritis Mother     Hypertension Mother     Hypertension Father     No Known Problems Sister     No Known Problems Sister     No Known Problems Brother     Heart defect Maternal Grandfather     No Known Problems Maternal Grandmother     No Known Problems Paternal Grandfather     No Known Problems Paternal Grandmother     Breast cancer Neg Hx     Cancer Neg Hx     Colon cancer Neg Hx     Ovarian cancer Neg Hx      Social History     Socioeconomic History     Marital status:    Tobacco Use    Smoking status: Never    Smokeless tobacco: Never   Substance and Sexual Activity    Alcohol use: Yes     Comment: socially    Drug use: Never    Sexual activity: Yes     Medication List with Changes/Refills   New Medications    NAPROXEN (NAPROSYN) 500 MG TABLET    Take 1 tablet (500 mg total) by mouth 2 (two) times daily.   Current Medications    AUBAGIO 14 MG TAB    Take 1 tablet by mouth once daily.    BACLOFEN (LIORESAL) 20 MG TABLET    TAKE ONE TABLET BY MOUTH EVERY DAY    CARBAMAZEPINE (TEGRETOL) 200 MG TABLET    Take 200 mg by mouth daily as needed.    FERROUS SULFATE (FEOSOL) TAB TABLET    Take 1 tablet by mouth daily with breakfast.    GABAPENTIN (NEURONTIN) 300 MG CAPSULE    Take 1 capsule (300 mg total) by mouth 3 (three) times daily.    IBUPROFEN (ADVIL,MOTRIN) 800 MG TABLET    Take 800 mg by mouth 3 (three) times daily as needed.    LEVOTHYROXINE (SYNTHROID) 137 MCG TAB TABLET    Take 137 mcg by mouth.    LOSARTAN (COZAAR) 50 MG TABLET    Take 1 tablet by mouth once daily.    MECLIZINE (ANTIVERT) 25 MG TABLET    Take 25 mg by mouth 3 (three) times daily as needed.    MEDROXYPROGESTERONE (PROVERA) 10 MG TABLET    Take 1 tablet (10 mg total) by mouth once daily. for 10 days    MULTIVITAMIN (THERAGRAN) PER TABLET    Take 1 tablet by mouth once daily.    SHINGRIX, PF, 50 MCG/0.5 ML INJECTION       Discontinued Medications    NAPROXEN (NAPROSYN) 500 MG TABLET    Take 1 tablet (500 mg total) by mouth 2 (two) times daily with meals. For pain and inflammation     Review of patient's allergies indicates:  No Known Allergies  ROS    Physical Exam:   Body mass index is 28.79 kg/m².  There were no vitals filed for this visit.   GENERAL: Well appearing, appropriate for stated age, no acute distress.  CARDIOVASCULAR: Pulses regular by peripheral palpation.  PULMONARY: Respirations are even and non-labored.  NEURO: Awake, alert, and oriented x 3.  PSYCH: Mood & affect are  appropriate.  HEENT: Head is normocephalic and atraumatic.  Ortho/SPM Exam  Right Knee:    Inspection: mild effusion    Palpation tenderness: Medial joint line    Range of motion: 10 deg extension - 110 deg flexion; pain at end ranges    Strength:  4/5 Extension    4/5 Flexion    4+/5 Hip Abduction    Valgus (-)  Varus (-)  Lachman (-)  Edel (+); medial compartment  Intact EHL, FHL, gastrocsoleus, and tibialis anterior. Sensation intact to light touch in superficial peroneal, deep peroneal, tibial, sural, and saphenous nerve distributions. Foot warm and well perfused with capillary refill of less than 2 seconds and palpable pedal pulses.          Imaging:    MRI Knee Without Contrast Right  Narrative: EXAM:  MRI KNEE WITHOUT CONTRAST RIGHT    CLINICAL HISTORY:  Right knee pain.  Meniscus tear.    TECHNIQUE: Standard multiplanar, multisequence MR imaging protocol of the right knee was performed.    FINDINGS:  MENISCI:  Medial: Mild edema surrounding the inferior meniscal ligament/attachment at the posterior aspect of the body segment.  No discernible tear.  Lateral: No discernible tear.    LIGAMENTS: Cruciate ligaments are intact.  Collateral ligaments are intact.    MUSCULOTENDINOUS: Extensor mechanism intact.    CARTILAGE:  Patellofemoral compartment: Mild thinning and irregularity at the mid aspect of the lateral patellar facet with partial-thickness cartilage fissuring.  No definite full-thickness defect.  Medial compartment: Relatively well-preserved.  Lateral compartment: Relatively well-preserved.    BONES: No acute fracture or suspicious osseous lesion.    FLUID: Tiny joint effusion. No intra-articular body.  Impression: 1.  Mild edema at the inferior medial meniscus ligaments /attachment at the posterior aspect of the body segment (coronal series 4, image 18).  Probable sequela of recent injury.  No discernible tear.  2.  Mild lateral patellar chondrosis.  No significant osseous degenerative change.  3.   Tiny joint effusion.    Finalized on: 4/20/2023 9:09 AM By:  Derik March III, MD  R# [id number]      2023-04-20 09:11:28.653    [facility]      Relevant imaging results reviewed and interpreted by me, discussed with the patient and / or family today.     Other Tests:         Patient Instructions   Assessment:  Nisha Mancuso is a  47 y.o. female    with a chief complaint of Pain of the Right Knee    Injury in July 2022  Chronic pain of right knee  Effusion   Hx of MS    Encounter Diagnoses   Name Primary?    Right medial knee pain Yes    Old tear of medial meniscus of right knee, unspecified tear type           Plan:  Right knee 2-2-40 corticosteroid injection performed today  Referral placed to physical therapy at [facility]  Fitted for a hinged knee brace today  10 minutes were spent sizing, fitting, and educating regarding durable medical equipment by Dr. Nickolas Wolf and his assistant under his direction today.  CPT 01971.  Prescribed Naproxen 500mg, twice daily as needed    Follow-up: 2 months  or sooner if there are any problems between now and then.    Leave Review:   Google: Leave Google Review  Healthgrades: Leave Healthgrades Review    After Hours Number: [phone]      Provider Note/Medical Decision Making:       I discussed worrisome and red flag signs and symptoms with the patient. The patient expressed understanding and agreed to alert me immediately or to go to the emergency room if they experience any of these.   Treatment plan was developed with input from the patient/family, and they expressed understanding and agreement with the plan. All questions were answered today.          Nickolas Wolf MD  Orthopaedic Surgery & Sports Medicine       Disclaimer: This note was prepared using a voice recognition system and is likely to have sound alike errors within the text.     I, Bryanna Bynum, acted as a scribe for Nickolas Wolf MD for the duration of this office  visit.

## 2023-05-12 NOTE — PROCEDURES
Large Joint Aspiration/Injection: R knee    Date/Time: 5/12/2023 9:15 AM  Performed by: Nickolas Wolf MD  Authorized by: Nickolas Wolf MD     Consent Done?:  Yes (Verbal)  Indications:  Joint swelling and pain  Site marked: the procedure site was marked    Timeout: prior to procedure the correct patient, procedure, and site was verified    Prep: patient was prepped and draped in usual sterile fashion      Local anesthesia used?: Yes    Anesthesia:  Local infiltration  Local anesthetic:  Bupivacaine 0.5% without epinephrine, lidocaine 1% without epinephrine, topical anesthetic and lidocaine spray    Details:  Needle Size:  22 G  Approach:  Superior  Location:  Knee  Site:  R knee  Medications:  80 mg methylPREDNISolone acetate 80 mg/mL  Patient tolerance:  Patient tolerated the procedure well with no immediate complications     2cc 1% lidocaine plain, 2cc 0.5% marcaine plain, 0.5cc 80mg methylprednisolone    Procedure Note:  We discussed the risk and benefits of injections, including pain, infection, bleeding, damage to adjacent structures, risk of reaction to injection. We discussed the steroid/cortisone injections will not heal the problem but mat help decrease inflammation and help with symptoms. We discussed the risk of repeated injections. The patient expressed understanding and wanted to proceed with the injection. We performed a timeout to verify the proper patient, proper procedure, and the proper site. The injection site was prepared in a sterile fashion. The patient tolerated it well and there were no complication. We did discuss with the patient that steroid injections can cause some increase in blood sugar and blood pressure for up to a week after the injection.

## 2023-05-12 NOTE — PATIENT INSTRUCTIONS
Assessment:  Nisha Mancuso is a  47 y.o. female    with a chief complaint of Pain of the Right Knee    Injury in July 2022  Chronic pain of right knee  Effusion   Hx of MS    Encounter Diagnoses   Name Primary?    Right medial knee pain Yes    Old tear of medial meniscus of right knee, unspecified tear type           Plan:  Right knee 2-2-40 corticosteroid injection performed today  Referral placed to physical therapy at [facility]  Fitted for a hinged knee brace today  10 minutes were spent sizing, fitting, and educating regarding durable medical equipment by Dr. Nickolas Wolf and his assistant under his direction today.  CPT 79519.  Prescribed Naproxen 500mg, twice daily as needed    Follow-up: 2 months  or sooner if there are any problems between now and then.    Leave Review:   Google: Leave Google Review  Healthgrades: Leave Healthgrades Review    After Hours Number: [phone]

## 2023-05-15 ENCOUNTER — TELEPHONE (OUTPATIENT)
Dept: UROLOGY | Facility: CLINIC | Age: 48
End: 2023-05-15
Payer: COMMERCIAL

## 2023-05-15 NOTE — TELEPHONE ENCOUNTER
----- Message from Giovanna Henderson sent at 5/15/2023  1:43 PM CDT -----  Contact: Nisha Cameron would like to cancel her procedure that is on 5/25/23 as she stated she is not having symptoms any longer and doesn't feel it necessary. Please call her at [phone]

## 2023-05-19 RX ORDER — NAPROXEN 500 MG/1
500 TABLET ORAL 2 TIMES DAILY
Qty: 60 TABLET | Refills: 2 | Status: SHIPPED | OUTPATIENT
Start: 2023-05-19 | End: 2024-02-22 | Stop reason: ALTCHOICE

## 2023-05-19 RX ORDER — METHYLPREDNISOLONE ACETATE 80 MG/ML
80 INJECTION, SUSPENSION INTRA-ARTICULAR; INTRALESIONAL; INTRAMUSCULAR; SOFT TISSUE
Status: DISCONTINUED | OUTPATIENT
Start: 2023-05-12 | End: 2023-05-19 | Stop reason: HOSPADM

## 2023-05-22 ENCOUNTER — PATIENT MESSAGE (OUTPATIENT)
Dept: INTERNAL MEDICINE | Facility: CLINIC | Age: 48
End: 2023-05-22
Payer: COMMERCIAL

## 2023-05-25 ENCOUNTER — PATIENT MESSAGE (OUTPATIENT)
Dept: INTERNAL MEDICINE | Facility: CLINIC | Age: 48
End: 2023-05-25
Payer: COMMERCIAL

## 2023-05-25 ENCOUNTER — LAB VISIT (OUTPATIENT)
Dept: LAB | Facility: HOSPITAL | Age: 48
End: 2023-05-25
Attending: INTERNAL MEDICINE
Payer: COMMERCIAL

## 2023-05-25 DIAGNOSIS — Z00.00 PREVENTATIVE HEALTH CARE: ICD-10-CM

## 2023-05-25 LAB
ALBUMIN SERPL BCP-MCNC: 3.7 G/DL (ref 3.5–5.2)
ALP SERPL-CCNC: 59 U/L (ref 55–135)
ALT SERPL W/O P-5'-P-CCNC: 14 U/L (ref 10–44)
ANION GAP SERPL CALC-SCNC: 10 MMOL/L (ref 8–16)
AST SERPL-CCNC: 18 U/L (ref 10–40)
BASOPHILS # BLD AUTO: 0.05 K/UL (ref 0–0.2)
BASOPHILS NFR BLD: 0.8 % (ref 0–1.9)
BILIRUB SERPL-MCNC: 0.3 MG/DL (ref 0.1–1)
BUN SERPL-MCNC: 12 MG/DL (ref 6–20)
CALCIUM SERPL-MCNC: 9 MG/DL (ref 8.7–10.5)
CHLORIDE SERPL-SCNC: 107 MMOL/L (ref 95–110)
CHOLEST SERPL-MCNC: 247 MG/DL (ref 120–199)
CHOLEST/HDLC SERPL: 3.8 {RATIO} (ref 2–5)
CO2 SERPL-SCNC: 24 MMOL/L (ref 23–29)
CREAT SERPL-MCNC: 0.8 MG/DL (ref 0.5–1.4)
DIFFERENTIAL METHOD: ABNORMAL
EOSINOPHIL # BLD AUTO: 0.2 K/UL (ref 0–0.5)
EOSINOPHIL NFR BLD: 3.2 % (ref 0–8)
ERYTHROCYTE [DISTWIDTH] IN BLOOD BY AUTOMATED COUNT: 19.9 % (ref 11.5–14.5)
EST. GFR  (NO RACE VARIABLE): >60 ML/MIN/1.73 M^2
ESTIMATED AVG GLUCOSE: 97 MG/DL (ref 68–131)
GLUCOSE SERPL-MCNC: 80 MG/DL (ref 70–110)
HBA1C MFR BLD: 5 % (ref 4–5.6)
HCT VFR BLD AUTO: 36 % (ref 37–48.5)
HDLC SERPL-MCNC: 65 MG/DL (ref 40–75)
HDLC SERPL: 26.3 % (ref 20–50)
HGB BLD-MCNC: 10.9 G/DL (ref 12–16)
IMM GRANULOCYTES # BLD AUTO: 0.02 K/UL (ref 0–0.04)
IMM GRANULOCYTES NFR BLD AUTO: 0.3 % (ref 0–0.5)
LDLC SERPL CALC-MCNC: 161.2 MG/DL (ref 63–159)
LYMPHOCYTES # BLD AUTO: 1.3 K/UL (ref 1–4.8)
LYMPHOCYTES NFR BLD: 21.9 % (ref 18–48)
MCH RBC QN AUTO: 25.6 PG (ref 27–31)
MCHC RBC AUTO-ENTMCNC: 30.3 G/DL (ref 32–36)
MCV RBC AUTO: 85 FL (ref 82–98)
MONOCYTES # BLD AUTO: 0.4 K/UL (ref 0.3–1)
MONOCYTES NFR BLD: 7.3 % (ref 4–15)
NEUTROPHILS # BLD AUTO: 4 K/UL (ref 1.8–7.7)
NEUTROPHILS NFR BLD: 66.5 % (ref 38–73)
NONHDLC SERPL-MCNC: 182 MG/DL
NRBC BLD-RTO: 0 /100 WBC
PLATELET # BLD AUTO: 381 K/UL (ref 150–450)
PMV BLD AUTO: 10.4 FL (ref 9.2–12.9)
POTASSIUM SERPL-SCNC: 4.7 MMOL/L (ref 3.5–5.1)
PROT SERPL-MCNC: 6.9 G/DL (ref 6–8.4)
RBC # BLD AUTO: 4.25 M/UL (ref 4–5.4)
SODIUM SERPL-SCNC: 141 MMOL/L (ref 136–145)
TRIGL SERPL-MCNC: 104 MG/DL (ref 30–150)
WBC # BLD AUTO: 5.99 K/UL (ref 3.9–12.7)

## 2023-05-25 PROCEDURE — 80053 COMPREHEN METABOLIC PANEL: CPT | Performed by: INTERNAL MEDICINE

## 2023-05-25 PROCEDURE — 36415 COLL VENOUS BLD VENIPUNCTURE: CPT | Performed by: INTERNAL MEDICINE

## 2023-05-25 PROCEDURE — 83036 HEMOGLOBIN GLYCOSYLATED A1C: CPT | Performed by: INTERNAL MEDICINE

## 2023-05-25 PROCEDURE — 85025 COMPLETE CBC W/AUTO DIFF WBC: CPT | Performed by: INTERNAL MEDICINE

## 2023-05-25 PROCEDURE — 80061 LIPID PANEL: CPT | Performed by: INTERNAL MEDICINE

## 2023-05-25 RX ORDER — LEVOTHYROXINE SODIUM 137 UG/1
137 TABLET ORAL
Qty: 90 TABLET | Refills: 0 | Status: SHIPPED | OUTPATIENT
Start: 2023-05-25 | End: 2023-05-31 | Stop reason: SDUPTHER

## 2023-05-25 NOTE — TELEPHONE ENCOUNTER
No care due was identified.  [facility] Embedded Care Due Messages. Reference number: [id number].   5/25/2023 2:11:18 PM CDT

## 2023-05-26 ENCOUNTER — PATIENT MESSAGE (OUTPATIENT)
Dept: INTERNAL MEDICINE | Facility: CLINIC | Age: 48
End: 2023-05-26
Payer: COMMERCIAL

## 2023-05-30 ENCOUNTER — CLINICAL SUPPORT (OUTPATIENT)
Dept: REHABILITATION | Facility: HOSPITAL | Age: 48
End: 2023-05-30
Attending: ORTHOPAEDIC SURGERY
Payer: COMMERCIAL

## 2023-05-30 ENCOUNTER — PATIENT MESSAGE (OUTPATIENT)
Dept: INTERNAL MEDICINE | Facility: CLINIC | Age: 48
End: 2023-05-30
Payer: COMMERCIAL

## 2023-05-30 DIAGNOSIS — M25.561 RIGHT MEDIAL KNEE PAIN: ICD-10-CM

## 2023-05-30 DIAGNOSIS — Z74.09 DECREASED FUNCTIONAL MOBILITY AND ENDURANCE: ICD-10-CM

## 2023-05-30 DIAGNOSIS — M23.203 OLD TEAR OF MEDIAL MENISCUS OF RIGHT KNEE, UNSPECIFIED TEAR TYPE: ICD-10-CM

## 2023-05-30 DIAGNOSIS — R29.898 DECREASED STRENGTH OF LOWER EXTREMITY: ICD-10-CM

## 2023-05-30 DIAGNOSIS — M25.661 DECREASED RANGE OF MOTION (ROM) OF RIGHT KNEE: ICD-10-CM

## 2023-05-30 PROCEDURE — 97110 THERAPEUTIC EXERCISES: CPT

## 2023-05-30 PROCEDURE — 97162 PT EVAL MOD COMPLEX 30 MIN: CPT

## 2023-05-30 NOTE — PROGRESS NOTES
"[facility]   Physical Therapy Initial Evaluation      Date: 5/30/2023   Name: Nisha Mancuso  Clinic Number: [id number]    Therapy Diagnosis:  Encounter Diagnoses   Name Primary?    Right medial knee pain     Old tear of medial meniscus of right knee, unspecified tear type     Decreased range of motion (ROM) of right knee     Decreased strength of lower extremity     Decreased functional mobility and endurance       Physician: Nickolas Wolf MD     Physician Orders: Physical Therapy Evaluation and Treat  Medical Diagnosis from Referral:   M25.561 (ICD-10-CM) - Right medial knee pain   M23.203 (ICD-10-CM) - Old tear of medial meniscus of right knee, unspecified tear type   Evaluation Date: 5/30/2023  Authorization Period Expiration:   Plan of Care Expiration: 09/22/2023  Progress Note Due: 06/29/2023  Visit # / Visits authorized: 1/1   FOTO: 1/3     Precautions: Multiple Sclerosis     Time In: 7:00 AM  Time Out: 7:40 AM  Total Billable Time (timed & untimed codes): 40 minutes    SUBJECTIVE   Date of onset: June 2022    History of current condition - Nisha reports: initial injury was in June 2022 where she states she slipped and fell in a restaurant. Patient states she initially hurt bilateral knees but states recently her right knee is hurting a lot more. Patient reports she went to [place] in January 2023 where she "walked 37 miles in 3 days." Patient states she really thinks this exacerbated her pain. Patient states she did recently get an injection in her knee that she reports helped as well as a knee brace that she wears to work.    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   Current Level of Function: Independent with all ADL, IADL, community mobility and functional activities with reports of increased pain and need for increased time and frequent breaks.      Dominant Extremity:    [x] Right    [] Left      Medical History:   Past Medical History:   Diagnosis Date    Hypertension     Multiple sclerosis     Thyroid cancer     Thyroid cancer 2011       Surgical History:   Nisha Mancuso  has a past surgical history that includes Thyroidectomy; Colonoscopy (N/A, 10/14/2022); xi robotic hysterectomy, with salpingectomy (N/A, 2/10/2023); and excision, mass (N/A, 2/10/2023).    Medications:   Nisha has a current medication list which includes the following prescription(s): aubagio, baclofen, carbamazepine, ferrous sulfate, gabapentin, ibuprofen, levothyroxine, losartan, meclizine, medroxyprogesterone, multivitamin, naproxen, and shingrix (pf).    Allergies:   Review of patient's allergies indicates:  No Known Allergies       OBJECTIVE     Sensation:  [x] Intact to Light Touch   [] Impaired:    Palpation: Increased tone and tenderness noted with palpation of right medial knee joint line       Gait Analysis: The patient ambulated with the following assistive device: none; the patient presents with the following gait abnormalities: decreased gait speed and decreased full right knee extension (knee brace donned)      Functional Movement  Analysis   Squat Painful  and Dysfunctional   Sit to Stand Painful  and Dysfunctional       RANGE OF MOTION:    Knee AROM/PROM Right Left  Pain/Dysfunction with Movement Goal   Knee Flexion (135) 110 WITHIN FUNCTIONAL LIMITS   120 bilateral   Knee Extension (0) -5 WITHIN FUNCTIONAL LIMITS   0 bilateral     Bilateral Hip and Ankle WITHIN FUNCTIONAL LIMITS     STRENGTH:    L/E MMT Right   Left Pain/Dysfunction with Movement Goal   Hip Flexion  4/5 4/5  4+/5 Bilateral   Hip Extension  3+/5 4/5 Pain in right knee near medial joint line 4+/5 Bilateral   Hip Abduction  3+/5 4-/5 Pain in right knee near medial joint line 4+/5 Bilateral   Knee Extension 3+/5 4/5 Pain in right knee near medial joint line 5/5 Bilateral   Knee Flexion 3+/5 4/5  5/5 Bilateral   Ankle Dorsiflexion 5/5 5/5  5/5 Bilateral   Ankle Plantarflexion 5/5 5/5  5/5 Bilateral       MUSCLE LENGTH:     Muscle Tested  Right Left  Goal   Hamstrings  decreased decreased Normal Bilateral   Gastrocnemius  decreased decreased Normal Bilateral       JOINT MOBILITY:     Joint Motion Tested Right   Left  Goal   Knee Hypomobile Normal Normal Bilateral       SPECIAL TESTS:     Right   Left  Goal   Edel's Positive Negative Negative Bilateral   Thessaly's Positive Negative Negative Bilateral       FUNCTION:     CMS Impairment/Limitation/Restriction for FOTO Knee Muscle/Tendon Survey    Therapist reviewed FOTO scores for Nisha on 5/30/2023.   FOTO documents entered into [employer] - see Media section.    Limitation Score: 42%         TREATMENT       Nisha received the treatments listed below:      THERAPEUTIC EXERCISES to develop strength, endurance, ROM, flexibility, posture, and core stabilization for (25) minutes including:    Intervention Performed Today    HOME EXERCISE PROGRAM established  x 10 minutes. See EMR for details   Education on current condition x 15 minutes                                   Plan for Next Visit: recumbent bike, shuttle squat, quad sets, bridges, clamshells, LONG ARC QUADs, prone hamstring curls, standing calf raises, manual distraction of right knee       PATIENT EDUCATION  AND HOME EXERCISES     Education provided: (15) minutes  PURPOSE: Patient educated on the impairments noted above and the effects of physical therapy intervention to improve overall condition and QOL.   EXERCISE: Patient was educated on all the above exercise prior/during/after for proper posture, positioning, and execution for safe performance with home exercise program.   STRENGTH: Patient educated on the importance of improved core and extremity strength in order to improve alignment of the spine and extremities with static positions and dynamic movement.   ASSISTIVE DEVICE: Patient educated on proper utilization of assistive device for safe and efficient ambulation and to reduce risk of falls    Written Home Exercises Provided: yes.  Exercises were reviewed and Nisha was able to demonstrate them prior to the end of the session.  Nisha demonstrated good  understanding of the education provided. See EMR under Patient Instructions for exercises provided during therapy sessions.    ASSESSMENT     Nisha is a 47 y.o. female referred to outpatient Physical Therapy with a medical diagnosis of right medial knee pain. Patient presents with impairments including: impairments list: ROM, strength, endurance, joint mobility, muscle length, balance, gait mechanics, and functional movement patterns.    Patient prognosis is Good.   Patient will benefit from skilled outpatient Physical Therapy to address the deficits stated above and in the chart below, provide patient/family education, and to maximize patient's level of independence.     Plan of care discussed with patient: Yes  Patient's spiritual, cultural and educational needs considered and patient is agreeable to the plan of care and goals as stated below:     Anticipated Barriers for therapy: co-morbidities, sedentary lifestyle, chronicity of condition, and transportation    Medical Necessity is demonstrated by the following   History  Co-morbidities and personal  factors that may impact the plan of care [] LOW: no personal factors / co-morbidities  [x] MODERATE: 1-2 personal factors / co-morbidities  [] HIGH: 3+ personal factors / co-morbidities    Moderate / High Support Documentation:   Past Medical History:   Diagnosis Date    Hypertension     Multiple sclerosis     Thyroid cancer     Thyroid cancer 2011         Examination  Body Structures and Functions, activity limitations and participation restrictions that may impact the plan of care [] LOW: addressing 1-2 elements  [] MODERATE: 3+ elements  [x] HIGH: 4+ elements (please support below)    Moderate / High Support Documentation: See evaluation / objective measurements above and FOTO.     Clinical Presentation [] LOW: stable  [x] MODERATE: Evolving  [] HIGH: Unstable     Decision Making/ Complexity Score: moderate         Goals:  Reviewed:5/30/2023      Short Term Goals:  6 weeks Status  Date Met   PAIN: Patient will report worst pain of 5/10 in order to progress toward max functional ability and improve quality of life. [x] Progressing  [] Met  [] Not Met    FUNCTION: Patient will demonstrate improved function as indicated by a functional limitation score of less than or equal to 36 out of 100 on FOTO. [x] Progressing  [] Met  [] Not Met    MOBILITY: Patient will improve Range of Motion to 50% of stated goals, listed in objective measures above, in order to progress towards independence with functional activities.  [x] Progressing  [] Met  [] Not Met    STRENGTH: Patient will improve strength to 50% of stated goals, listed in objective measures above, in order to progress towards independence with functional activities.  [x] Progressing  [] Met  [] Not Met    HEP: Patient will demonstrate independence with HEP in order to progress toward functional independence. [x] Progressing  [] Met  [] Not Met      Long Term Goals:  12 weeks Status Date Met   PAIN: Patient will report worst pain of 3/10 in order to progress toward  max functional ability and improve quality of life [x] Progressing  [] Met  [] Not Met    FUNCTION: Patient will demonstrate improved function as indicated by a functional limitation score of less than or equal to 29 out of 100 on FOTO. [x] Progressing  [] Met  [] Not Met    MOBILITY: Patient will improve Range of Motion to stated goals, listed in objective measures above, in order to return to maximal functional potential and improve quality of life. [x] Progressing  [] Met  [] Not Met    STRENGTH: Patient will improve strength to stated goals, listed in objective measures above, in order to improve functional independence and quality of life. [x] Progressing  [] Met  [] Not Met    GAIT: Patient will demonstrate normalized gait mechanics with minimal compensation in order to return to prior level of function. [x] Progressing  [] Met  [] Not Met    Patient will return to normal ADL's, IADL's, community involvement, recreational activities, and work-related activities with less than or equal to 3/10 pain and maximal function.  [x] Progressing  [] Met  [] Not Met      PLAN   Plan of Care Certification: 5/30/2023 to 09/22/2023.    Outpatient Physical Therapy 2 times weekly for 12 weeks to include any combination of the following interventions: virtual visits, dry needling, modalities, electrical stimulation (IFC, Pre-Mod, Attended with Functional Dry Needling), Gait Training, Manual Therapy, Moist Heat/ Ice, Neuromuscular Re-ed, Patient Education, Self Care, Therapeutic Exercise, and Therapeutic Activites     Lena Downing, PT, DPT      I CERTIFY THE NEED FOR THESE SERVICES FURNISHED UNDER THIS PLAN OF TREATMENT AND WHILE UNDER MY CARE   Physician's comments:     Physician's Signature: ___________________________________________________

## 2023-05-31 ENCOUNTER — OFFICE VISIT (OUTPATIENT)
Dept: INTERNAL MEDICINE | Facility: CLINIC | Age: 48
End: 2023-05-31
Payer: COMMERCIAL

## 2023-05-31 VITALS
TEMPERATURE: 98 F | BODY MASS INDEX: 28.47 KG/M2 | DIASTOLIC BLOOD PRESSURE: 72 MMHG | SYSTOLIC BLOOD PRESSURE: 136 MMHG | WEIGHT: 170.88 LBS | HEIGHT: 65 IN | HEART RATE: 80 BPM | OXYGEN SATURATION: 98 %

## 2023-05-31 DIAGNOSIS — E89.0 POSTOPERATIVE HYPOTHYROIDISM: ICD-10-CM

## 2023-05-31 DIAGNOSIS — Z00.00 ROUTINE GENERAL MEDICAL EXAMINATION AT A HEALTH CARE FACILITY: Primary | ICD-10-CM

## 2023-05-31 DIAGNOSIS — Z12.31 ENCOUNTER FOR SCREENING MAMMOGRAM FOR BREAST CANCER: ICD-10-CM

## 2023-05-31 DIAGNOSIS — G35 MS (MULTIPLE SCLEROSIS): Chronic | ICD-10-CM

## 2023-05-31 DIAGNOSIS — I10 HYPERTENSION, UNSPECIFIED TYPE: ICD-10-CM

## 2023-05-31 PROCEDURE — 3044F HG A1C LEVEL LT 7.0%: CPT | Mod: CPTII,S$GLB,, | Performed by: INTERNAL MEDICINE

## 2023-05-31 PROCEDURE — 99396 PREV VISIT EST AGE 40-64: CPT | Mod: S$GLB,,, | Performed by: INTERNAL MEDICINE

## 2023-05-31 PROCEDURE — 4010F PR ACE/ARB THEARPY RXD/TAKEN: ICD-10-PCS | Mod: CPTII,S$GLB,, | Performed by: INTERNAL MEDICINE

## 2023-05-31 PROCEDURE — 3044F PR MOST RECENT HEMOGLOBIN A1C LEVEL <7.0%: ICD-10-PCS | Mod: CPTII,S$GLB,, | Performed by: INTERNAL MEDICINE

## 2023-05-31 PROCEDURE — 3075F SYST BP GE 130 - 139MM HG: CPT | Mod: CPTII,S$GLB,, | Performed by: INTERNAL MEDICINE

## 2023-05-31 PROCEDURE — 99396 PR PREVENTIVE VISIT,EST,40-64: ICD-10-PCS | Mod: S$GLB,,, | Performed by: INTERNAL MEDICINE

## 2023-05-31 PROCEDURE — 1159F MED LIST DOCD IN RCRD: CPT | Mod: CPTII,S$GLB,, | Performed by: INTERNAL MEDICINE

## 2023-05-31 PROCEDURE — 3075F PR MOST RECENT SYSTOLIC BLOOD PRESS GE 130-139MM HG: ICD-10-PCS | Mod: CPTII,S$GLB,, | Performed by: INTERNAL MEDICINE

## 2023-05-31 PROCEDURE — 99999 PR PBB SHADOW E&M-EST. PATIENT-LVL IV: ICD-10-PCS | Mod: PBBFAC,,, | Performed by: INTERNAL MEDICINE

## 2023-05-31 PROCEDURE — 3008F BODY MASS INDEX DOCD: CPT | Mod: CPTII,S$GLB,, | Performed by: INTERNAL MEDICINE

## 2023-05-31 PROCEDURE — 4010F ACE/ARB THERAPY RXD/TAKEN: CPT | Mod: CPTII,S$GLB,, | Performed by: INTERNAL MEDICINE

## 2023-05-31 PROCEDURE — 3008F PR BODY MASS INDEX (BMI) DOCUMENTED: ICD-10-PCS | Mod: CPTII,S$GLB,, | Performed by: INTERNAL MEDICINE

## 2023-05-31 PROCEDURE — 3078F PR MOST RECENT DIASTOLIC BLOOD PRESSURE < 80 MM HG: ICD-10-PCS | Mod: CPTII,S$GLB,, | Performed by: INTERNAL MEDICINE

## 2023-05-31 PROCEDURE — 1159F PR MEDICATION LIST DOCUMENTED IN MEDICAL RECORD: ICD-10-PCS | Mod: CPTII,S$GLB,, | Performed by: INTERNAL MEDICINE

## 2023-05-31 PROCEDURE — 3078F DIAST BP <80 MM HG: CPT | Mod: CPTII,S$GLB,, | Performed by: INTERNAL MEDICINE

## 2023-05-31 PROCEDURE — 99999 PR PBB SHADOW E&M-EST. PATIENT-LVL IV: CPT | Mod: PBBFAC,,, | Performed by: INTERNAL MEDICINE

## 2023-05-31 RX ORDER — LOSARTAN POTASSIUM 50 MG/1
50 TABLET ORAL DAILY
Qty: 90 TABLET | Refills: 1 | Status: SHIPPED | OUTPATIENT
Start: 2023-05-31 | End: 2023-10-09 | Stop reason: SDUPTHER

## 2023-05-31 RX ORDER — LEVOTHYROXINE SODIUM 137 UG/1
137 TABLET ORAL
Qty: 90 TABLET | Refills: 0 | Status: SHIPPED | OUTPATIENT
Start: 2023-05-31 | End: 2024-02-22 | Stop reason: SDUPTHER

## 2023-05-31 NOTE — PROGRESS NOTES
Subjective:      Patient ID: Nisha Mancuso is a 47 y.o. female.    Chief Complaint: Follow-up    Follow-up  Pertinent negatives include no abdominal pain, chest pain, chills, coughing, fever or sore throat.       47 y.o. with  Patient Active Problem List   Diagnosis    MS (multiple sclerosis)    History of thyroid cancer    Postoperative hypothyroidism    Hypertension    DRE (stress urinary incontinence, female)    Status post laparoscopic hysterectomy    Decreased range of motion (ROM) of right knee    Decreased strength of lower extremity    Decreased functional mobility and endurance    Routine general medical examination at a health care facility     Past Medical History:   Diagnosis Date    Hypertension     Multiple sclerosis     Thyroid cancer     Thyroid cancer 2011       Here today for annual prev exam.  Compliant with meds without significant side effects. Energy and appetite are good.         Past Surgical History:   Procedure Laterality Date    COLONOSCOPY N/A 10/14/2022    Procedure: COLONOSCOPY;  Surgeon: Geetha Alonso MD;  Location: [facility];  Service: Endoscopy;  Laterality: N/A;    EXCISION, MASS N/A 02/10/2023    Procedure: EXCISION, MASS;  Surgeon: ERIK Rodarte MD;  Location: [facility] OR;  Service: OB/GYN;  Laterality: N/A;    THYROIDECTOMY      XI ROBOTIC HYSTERECTOMY, WITH SALPINGECTOMY N/A 02/10/2023    Procedure: XI ROBOTIC HYSTERECTOMY,WITH SALPINGECTOMY;  Surgeon: ERIK Rodarte MD;  Location: [facility];  Service: OB/GYN;  Laterality: N/A;     Social History     Socioeconomic History    Marital status:    Tobacco Use    Smoking status: Never    Smokeless tobacco: Never   Substance and Sexual Activity    Alcohol use: Yes     Comment: socially    Drug use: Never    Sexual activity: Yes     family history includes Arthritis in her mother; Heart defect in her maternal grandfather; Hypertension in her father and mother; No Known Problems in her brother, maternal grandmother,  "paternal grandfather, paternal grandmother, sister, and sister.  Review of Systems   Constitutional:  Negative for chills and fever.   HENT:  Negative for ear pain and sore throat.    Respiratory:  Negative for cough.    Cardiovascular:  Negative for chest pain.   Gastrointestinal:  Negative for abdominal pain and blood in stool.   Genitourinary:  Negative for dysuria and hematuria.   Neurological:  Negative for seizures and syncope.   Objective:   /72 (BP Location: Right arm, Patient Position: Sitting, BP Method: Medium (Manual))   Pulse 80   Temp 97.8 °F (36.6 °C) (Tympanic)   Ht 5' 5" (1.651 m)   Wt 77.5 kg (170 lb 13.7 oz)   LMP  (LMP Unknown)   SpO2 98%   BMI 28.43 kg/m²     Physical Exam  Constitutional:       General: She is not in acute distress.     Appearance: She is well-developed.   HENT:      Head: Normocephalic and atraumatic.   Eyes:      Extraocular Movements: Extraocular movements intact.   Neck:      Thyroid: No thyromegaly.   Cardiovascular:      Rate and Rhythm: Normal rate and regular rhythm.   Pulmonary:      Breath sounds: Normal breath sounds. No wheezing or rales.   Abdominal:      General: Bowel sounds are normal.      Palpations: Abdomen is soft.      Tenderness: There is no abdominal tenderness.   Musculoskeletal:         General: No swelling.      Cervical back: Neck supple. No rigidity.   Lymphadenopathy:      Cervical: No cervical adenopathy.   Skin:     General: Skin is warm and dry.   Neurological:      Mental Status: She is alert and oriented to person, place, and time.   Psychiatric:         Behavior: Behavior normal.       Lab Results   Component Value Date    WBC 5.99 05/25/2023    HGB 10.9 (L) 05/25/2023    HGB 10.2 (L) 02/03/2023    HGB 11.3 (L) 06/18/2022    HCT 36.0 (L) 05/25/2023    MCV 85 05/25/2023    MCV 87 02/03/2023    MCV 97 06/18/2022     05/25/2023    CHOL 247 (H) 05/25/2023    TRIG 104 05/25/2023    HDL 65 05/25/2023    LDLCALC 161.2 (H) " 05/25/2023    LDLCALC 123.2 05/24/2022    LDLCALC 133.0 10/21/2019    ALT 14 05/25/2023    AST 18 05/25/2023     05/25/2023    K 4.7 05/25/2023    CALCIUM 9.0 05/25/2023     05/25/2023    CO2 24 05/25/2023    BUN 12 05/25/2023    CREATININE 0.8 05/25/2023    CREATININE 0.8 02/03/2023    CREATININE 0.7 05/24/2022    EGFRNORACEVR >60.0 05/25/2023    EGFRNORACEVR >60 02/03/2023    TSH 0.830 05/24/2022    TSH 1.805 05/18/2022    TSH 9.438 (H) 12/11/2020    GLU 80 05/25/2023    HGBA1C 5.0 05/25/2023    HGBA1C 4.9 05/24/2022    HGBA1C 5.2 10/21/2019          The 10-year ASCVD risk score (Yuniel VELIZ, et al., 2019) is: 1.9%    Values used to calculate the score:      Age: 47 years      Sex: Female      Is Non- : No      Diabetic: No      Tobacco smoker: No      Systolic Blood Pressure: 136 mmHg      Is BP treated: Yes      HDL Cholesterol: 65 mg/dL      Total Cholesterol: 247 mg/dL     Assessment:     1. Routine general medical examination at a health care facility    2. Encounter for screening mammogram for breast cancer    3. Hypertension, unspecified type    4. Postoperative hypothyroidism    5. MS (multiple sclerosis)      Plan:   1. Routine general medical examination at a health care facility  Overview:  Reports tdap in 2014  Heart healthy diet, regular exercise, and regular use of sunscreen.    reviewed      2. Encounter for screening mammogram for breast cancer  -     Mammo Digital Screening Bilat w/ Tony; Future; Expected date: 05/31/2023    3. Hypertension, unspecified type  -     losartan (COZAAR) 50 MG tablet; Take 1 tablet (50 mg total) by mouth once daily.  Dispense: 90 tablet; Refill: 1    4. Postoperative hypothyroidism  -     levothyroxine (SYNTHROID) 137 MCG Tab tablet; Take 1 tablet (137 mcg total) by mouth before breakfast.  Dispense: 90 tablet; Refill: 0    5. MS (multiple sclerosis)  Overview:  Dx 2012          There are no Patient Instructions on file for this  visit.    Future Appointments   Date Time Provider Department Center   6/2/2023  2:00 PM Lena Chang, PT [facility] [place]   6/14/2023  2:30 PM Lena Chang, PT [facility] [place]   6/16/2023  4:15 PM Kenny Lee, PTA [facility] [place]   6/21/2023  4:45 PM Lena Chang, PT [facility] [place]   6/28/2023  4:45 PM Lena Chang, PT [facility] [place]   7/5/2023  7:00 AM Lena Chang, PT [facility] [place]   7/7/2023  7:00 AM Kenny Burtonarlo, PTA [facility] [place]   7/12/2023  7:00 AM Lena Chang, PT [facility] [place]   7/12/2023 10:15 AM Nickolas Wolf MD [facility] [place]   7/14/2023  7:00 AM Lena Chang, PT [facility] [place]   7/19/2023  7:00 AM Lena Chang, PT [facility] [place]   7/21/2023  7:00 AM Lena Chang, PT [facility] [place]   7/26/2023  7:00 AM Lena Chang, PT [facility] [place]   7/28/2023  7:00 AM Lena Chang, PT [facility] [place]       Lab Frequency Next Occurrence   Mammo Digital Screening Bilat w/ Tony Once 05/24/2022   Basic Metabolic Panel Once 11/28/2022   CBC Auto Differential Once 11/28/2022   MRI Knee Without Contrast Right Once 04/18/2023       Follow up in about 6 months (around 11/30/2023), or if symptoms worsen or fail to improve.

## 2023-06-02 ENCOUNTER — CLINICAL SUPPORT (OUTPATIENT)
Dept: REHABILITATION | Facility: HOSPITAL | Age: 48
End: 2023-06-02
Payer: COMMERCIAL

## 2023-06-02 DIAGNOSIS — R29.898 DECREASED STRENGTH OF LOWER EXTREMITY: ICD-10-CM

## 2023-06-02 DIAGNOSIS — M25.661 DECREASED RANGE OF MOTION (ROM) OF RIGHT KNEE: Primary | ICD-10-CM

## 2023-06-02 DIAGNOSIS — Z74.09 DECREASED FUNCTIONAL MOBILITY AND ENDURANCE: ICD-10-CM

## 2023-06-02 PROCEDURE — 97112 NEUROMUSCULAR REEDUCATION: CPT

## 2023-06-02 PROCEDURE — 97530 THERAPEUTIC ACTIVITIES: CPT

## 2023-06-02 PROCEDURE — 97110 THERAPEUTIC EXERCISES: CPT

## 2023-06-02 NOTE — PROGRESS NOTES
[facility]   Physical Therapy Treatment Note     Name: Nisha Mancuso  Clinic Number: [id number]    Therapy Diagnosis:   Encounter Diagnoses   Name Primary?    Decreased range of motion (ROM) of right knee Yes    Decreased strength of lower extremity     Decreased functional mobility and endurance      Physician: Nickolas Wolf MD    Visit Date: 6/2/2023    Physician Orders: Physical Therapy Evaluation and Treat  Medical Diagnosis from Referral:   M25.561 (ICD-10-CM) - Right medial knee pain   M23.203 (ICD-10-CM) - Old tear of medial meniscus of right knee, unspecified tear type   Evaluation Date: 5/30/2023  Authorization Period Expiration: 12/31/2023  Plan of Care Expiration: 09/22/2023  Progress Note Due: 06/29/2023  Visit # / Visits authorized: 1/20 (+1 Evaluation)  FOTO: 1/3      Precautions: Multiple Sclerosis     PTA Visit #: 0/5     Time In: 2:00 PM  Time Out: 2:40 PM  Total Billable Time: 40 minutes    SUBJECTIVE     Patient reports: she is doing well today and has started to try to walk and take stairs at work more. Patient still with some medial right knee pain, but she reports it has gotten a little better.  She was compliant with home exercise program.  Response to previous treatment: good  Functional change: pain with squatting, pain with full right knee extension     Pain: 6/10  Location: right knee    OBJECTIVE     Objective Measures updated at progress report unless specified.       Treatment     Nisha received the treatments listed below:      THERAPEUTIC EXERCISES to develop strength, endurance, ROM, flexibility, posture, and core stabilization for (12) minutes including:     Intervention Performed Today     Upright Bike x 5 minutes, Level 1   Standing Calf Raises x 3x10 bilateral   Tailgaters  x 3 minutes 3lbs                                              Plan for Next Visit: progress as tolerated      NEUROMUSCULAR RE-EDUCATION activities to improve: Coordination,  Kinesthetic, Sense, and Proprioception for (19) minutes. The following activities were included: x = exercises performed     Neuromuscular Re-Education 6/2/2023    Clamshells x 3x10 bilateral   Bridges x 3x10   LONG ARC QUADs x 3x10 bilateral   Prone Hamstring Curls      BOLD= new this visit  Plan for Next Visit: progress as tolerated      THERAPEUTIC ACTIVITIES: to improve functional performance through dynamic activities, for (8)  minutes including:   x = performed today    Therapeutic Activities 6/2/2023    Shuttle Squat x 3 bands, 3x10   Standing TKE's x 3x10 red band              BOLD = new this visit  Plan for Next Visit: progress as tolerated     MANUAL THERAPY TECHNIQUES: Joint mobilizations, Soft tissue Mobilization, and mobilization with movement were applied to the area listed below for (0) minutes, including: x = exercises performed     Manual Intervention 6/2/2023    Soft Tissue Mobilization     Joint Mobilizations     Mobilization with movement     Functional Dry Needling      BOLD = new this visit  Plan for Next Visit: as needed        Patient Education and Home Exercises     Home Exercises Provided and Patient Education Provided     Education provided:   PURPOSE: Patient educated on the impairments noted above and the effects of physical therapy intervention to improve overall condition and QOL.   EXERCISE: Patient was educated on all the above exercise prior/during/after for proper posture, positioning, and execution for safe performance with home exercise program.   STRENGTH: Patient educated on the importance of improved core and extremity strength in order to improve alignment of the spine and extremities with static positions and dynamic movement.   ASSISTIVE DEVICE: Patient educated on proper utilization of assistive device for safe and efficient ambulation and to reduce risk of falls    Written Home Exercises Provided: Patient instructed to cont prior HEP. Exercises were reviewed and Nisha  was able to demonstrate them prior to the end of the session.  Nisha demonstrated good  understanding of the education provided. See EMR under Patient Instructions for exercises provided during therapy sessions    ASSESSMENT   Patient tolerated first treatment session well. Patient with some minor discomfort on medial right knee with extension based activities, but pain was reported to be tolerable. Plan to continue monitoring symptoms and progressing as tolerated. No adverse effects noted.    Nisha Is progressing well towards her goals.   Patient prognosis is Good.     Patient will continue to benefit from skilled outpatient physical therapy to address the deficits listed in the problem list box on initial evaluation, provide patient/family education and to maximize patient's level of independence in the home and community environment.     Patient's spiritual, cultural and educational needs considered and patient agreeable to plan of care and goals.     Anticipated barriers to physical therapy: co-morbidities, sedentary lifestyle, chronicity of condition, and transportation    Goals:   Reviewed:6/2/2023       Short Term Goals:  6 weeks Status  Date Met   PAIN: Patient will report worst pain of 5/10 in order to progress toward max functional ability and improve quality of life. [x] Progressing  [] Met  [] Not Met     FUNCTION: Patient will demonstrate improved function as indicated by a functional limitation score of less than or equal to 36 out of 100 on FOTO. [x] Progressing  [] Met  [] Not Met     MOBILITY: Patient will improve Range of Motion to 50% of stated goals, listed in objective measures above, in order to progress towards independence with functional activities.  [x] Progressing  [] Met  [] Not Met     STRENGTH: Patient will improve strength to 50% of stated goals, listed in objective measures above, in order to progress towards independence with functional activities.  [x] Progressing  [] Met  [] Not  Met     HEP: Patient will demonstrate independence with HEP in order to progress toward functional independence. [x] Progressing  [] Met  [] Not Met        Long Term Goals:  12 weeks Status Date Met   PAIN: Patient will report worst pain of 3/10 in order to progress toward max functional ability and improve quality of life [x] Progressing  [] Met  [] Not Met     FUNCTION: Patient will demonstrate improved function as indicated by a functional limitation score of less than or equal to 29 out of 100 on FOTO. [x] Progressing  [] Met  [] Not Met     MOBILITY: Patient will improve Range of Motion to stated goals, listed in objective measures above, in order to return to maximal functional potential and improve quality of life. [x] Progressing  [] Met  [] Not Met     STRENGTH: Patient will improve strength to stated goals, listed in objective measures above, in order to improve functional independence and quality of life. [x] Progressing  [] Met  [] Not Met     GAIT: Patient will demonstrate normalized gait mechanics with minimal compensation in order to return to prior level of function. [x] Progressing  [] Met  [] Not Met     Patient will return to normal ADL's, IADL's, community involvement, recreational activities, and work-related activities with less than or equal to 3/10 pain and maximal function.  [x] Progressing  [] Met  [] Not Met          PLAN     Continue Plan of Care (POC) and progress per patient tolerance. See treatment section for details on planned progressions next session.  Plan of Care Due: 09/22/2023    Lean Downing, PT

## 2023-06-07 ENCOUNTER — CLINICAL SUPPORT (OUTPATIENT)
Dept: REHABILITATION | Facility: HOSPITAL | Age: 48
End: 2023-06-07
Payer: COMMERCIAL

## 2023-06-07 DIAGNOSIS — M25.661 DECREASED RANGE OF MOTION (ROM) OF RIGHT KNEE: Primary | ICD-10-CM

## 2023-06-07 DIAGNOSIS — Z74.09 DECREASED FUNCTIONAL MOBILITY AND ENDURANCE: ICD-10-CM

## 2023-06-07 DIAGNOSIS — R29.898 DECREASED STRENGTH OF LOWER EXTREMITY: ICD-10-CM

## 2023-06-07 PROCEDURE — 97112 NEUROMUSCULAR REEDUCATION: CPT

## 2023-06-07 PROCEDURE — 97110 THERAPEUTIC EXERCISES: CPT

## 2023-06-07 PROCEDURE — 97140 MANUAL THERAPY 1/> REGIONS: CPT

## 2023-06-07 NOTE — PROGRESS NOTES
[facility]   Physical Therapy Treatment Note     Name: Nisha Mancuso  Clinic Number: [id number]    Therapy Diagnosis:   Encounter Diagnoses   Name Primary?    Decreased range of motion (ROM) of right knee Yes    Decreased strength of lower extremity     Decreased functional mobility and endurance      Physician: Nickolas Wolf MD    Visit Date: 6/7/2023    Physician Orders: Physical Therapy Evaluation and Treat  Medical Diagnosis from Referral:   M25.561 (ICD-10-CM) - Right medial knee pain   M23.203 (ICD-10-CM) - Old tear of medial meniscus of right knee, unspecified tear type   Evaluation Date: 5/30/2023  Authorization Period Expiration: 12/31/2023  Plan of Care Expiration: 09/22/2023  Progress Note Due: 06/29/2023  Visit # / Visits authorized: 2/20 (+1 Evaluation)  FOTO: 1/3      Precautions: Multiple Sclerosis     PTA Visit #: 0/5     Time In: 4:00 PM  Time Out: 4:35 PM  Total Billable Time: 35 minutes    SUBJECTIVE     Patient reports: she did have a lot of soreness after her last visit for about 2 days but states that recently she is feeling better.   She was compliant with home exercise program.  Response to previous treatment: good  Functional change: pain with squatting, pain with full right knee extension     Pain: 1-2/10  Location: right knee    OBJECTIVE     Objective Measures updated at progress report unless specified.       Treatment     Nisha received the treatments listed below:      THERAPEUTIC EXERCISES to develop strength, endurance, ROM, flexibility, posture, and core stabilization for (12) minutes including:     Intervention Performed Today     Upright Bike x 5 minutes, Level 1   Standing Calf Raises x 3x10 bilateral   Tailgaters  x 3 minutes 3lbs                                              Plan for Next Visit: progress as tolerated      NEUROMUSCULAR RE-EDUCATION activities to improve: Coordination, Kinesthetic, Sense, and Proprioception for (9) minutes. The  following activities were included: x = exercises performed     Neuromuscular Re-Education 6/7/2023    Clamshells x 3x10 bilateral   Bridges x 3x10   LONG ARC QUADs x 3x10 bilateral   Prone Hamstring Curls      BOLD= new this visit  Plan for Next Visit: progress as tolerated      THERAPEUTIC ACTIVITIES: to improve functional performance through dynamic activities, for (3)  minutes including:   x = performed today    Therapeutic Activities 6/7/2023    Shuttle Squat x 2 bands, 3x10   Standing TKE's  3x10 red band              BOLD = new this visit  Plan for Next Visit: progress as tolerated     MANUAL THERAPY TECHNIQUES: Joint mobilizations, Soft tissue Mobilization, and mobilization with movement were applied to the area listed below for (11) minutes, including: x = exercises performed     Manual Intervention 6/7/2023    Soft Tissue Mobilization x Right MEDIAL COLLATERAL LIGAMENT and surrounding tissue   Joint Mobilizations     Mobilization with movement     Functional Dry Needling      BOLD = new this visit  Plan for Next Visit: as needed        Patient Education and Home Exercises     Home Exercises Provided and Patient Education Provided     Education provided:   PURPOSE: Patient educated on the impairments noted above and the effects of physical therapy intervention to improve overall condition and QOL.   EXERCISE: Patient was educated on all the above exercise prior/during/after for proper posture, positioning, and execution for safe performance with home exercise program.   STRENGTH: Patient educated on the importance of improved core and extremity strength in order to improve alignment of the spine and extremities with static positions and dynamic movement.   ASSISTIVE DEVICE: Patient educated on proper utilization of assistive device for safe and efficient ambulation and to reduce risk of falls    Written Home Exercises Provided: Patient instructed to cont prior HEP. Exercises were reviewed and Nisha was  able to demonstrate them prior to the end of the session.  Nisha demonstrated good  understanding of the education provided. See EMR under Patient Instructions for exercises provided during therapy sessions    ASSESSMENT   Due to patient's recent flare up in soreness and symptoms (although since dissipated) patient was not progressed and completed same activities as last visit to become accustomed to these. Added manual therapy to increase nutrition to right medial joint and patient with good tolerance to this and swelling still noted in knee joint. Plan to continue monitoring symptoms and progressing as tolerated.     Nisha Is progressing well towards her goals.   Patient prognosis is Good.     Patient will continue to benefit from skilled outpatient physical therapy to address the deficits listed in the problem list box on initial evaluation, provide patient/family education and to maximize patient's level of independence in the home and community environment.     Patient's spiritual, cultural and educational needs considered and patient agreeable to plan of care and goals.     Anticipated barriers to physical therapy: co-morbidities, sedentary lifestyle, chronicity of condition, and transportation    Goals:   Reviewed:6/7/2023       Short Term Goals:  6 weeks Status  Date Met   PAIN: Patient will report worst pain of 5/10 in order to progress toward max functional ability and improve quality of life. [x] Progressing  [] Met  [] Not Met     FUNCTION: Patient will demonstrate improved function as indicated by a functional limitation score of less than or equal to 36 out of 100 on FOTO. [x] Progressing  [] Met  [] Not Met     MOBILITY: Patient will improve Range of Motion to 50% of stated goals, listed in objective measures above, in order to progress towards independence with functional activities.  [x] Progressing  [] Met  [] Not Met     STRENGTH: Patient will improve strength to 50% of stated goals, listed in  objective measures above, in order to progress towards independence with functional activities.  [x] Progressing  [] Met  [] Not Met     HEP: Patient will demonstrate independence with HEP in order to progress toward functional independence. [x] Progressing  [] Met  [] Not Met        Long Term Goals:  12 weeks Status Date Met   PAIN: Patient will report worst pain of 3/10 in order to progress toward max functional ability and improve quality of life [x] Progressing  [] Met  [] Not Met     FUNCTION: Patient will demonstrate improved function as indicated by a functional limitation score of less than or equal to 29 out of 100 on FOTO. [x] Progressing  [] Met  [] Not Met     MOBILITY: Patient will improve Range of Motion to stated goals, listed in objective measures above, in order to return to maximal functional potential and improve quality of life. [x] Progressing  [] Met  [] Not Met     STRENGTH: Patient will improve strength to stated goals, listed in objective measures above, in order to improve functional independence and quality of life. [x] Progressing  [] Met  [] Not Met     GAIT: Patient will demonstrate normalized gait mechanics with minimal compensation in order to return to prior level of function. [x] Progressing  [] Met  [] Not Met     Patient will return to normal ADL's, IADL's, community involvement, recreational activities, and work-related activities with less than or equal to 3/10 pain and maximal function.  [x] Progressing  [] Met  [] Not Met          PLAN     Continue Plan of Care (POC) and progress per patient tolerance. See treatment section for details on planned progressions next session.  Plan of Care Due: 09/22/2023    Lena Downing, PT

## 2023-06-14 ENCOUNTER — CLINICAL SUPPORT (OUTPATIENT)
Dept: REHABILITATION | Facility: HOSPITAL | Age: 48
End: 2023-06-14
Payer: COMMERCIAL

## 2023-06-14 DIAGNOSIS — Z74.09 DECREASED FUNCTIONAL MOBILITY AND ENDURANCE: ICD-10-CM

## 2023-06-14 DIAGNOSIS — M25.661 DECREASED RANGE OF MOTION (ROM) OF RIGHT KNEE: Primary | ICD-10-CM

## 2023-06-14 DIAGNOSIS — R29.898 DECREASED STRENGTH OF LOWER EXTREMITY: ICD-10-CM

## 2023-06-14 PROCEDURE — 97112 NEUROMUSCULAR REEDUCATION: CPT

## 2023-06-14 PROCEDURE — 97110 THERAPEUTIC EXERCISES: CPT

## 2023-06-14 PROCEDURE — 97530 THERAPEUTIC ACTIVITIES: CPT

## 2023-06-14 NOTE — PROGRESS NOTES
[facility]   Physical Therapy Treatment Note     Name: Nisha Mancuso  Clinic Number: [id number]    Therapy Diagnosis:   Encounter Diagnoses   Name Primary?    Decreased range of motion (ROM) of right knee Yes    Decreased strength of lower extremity     Decreased functional mobility and endurance      Physician: Nickolas Wolf MD    Visit Date: 6/14/2023    Physician Orders: Physical Therapy Evaluation and Treat  Medical Diagnosis from Referral:   M25.561 (ICD-10-CM) - Right medial knee pain   M23.203 (ICD-10-CM) - Old tear of medial meniscus of right knee, unspecified tear type   Evaluation Date: 5/30/2023  Authorization Period Expiration: 12/31/2023  Plan of Care Expiration: 09/22/2023  Progress Note Due: 06/29/2023  Visit # / Visits authorized: 3/20 (+1 Evaluation)  FOTO: 1/3      Precautions: Multiple Sclerosis     PTA Visit #: 0/5     Time In: 2:30 PM  Time Out: 3:15 PM  Total Billable Time: 45 minutes    SUBJECTIVE     Patient reports: she still has some minor pain on the inside of her right knee but overall reports it's doing a little better.  She was compliant with home exercise program.  Response to previous treatment: good  Functional change: pain with squatting, pain with full right knee extension     Pain: 1-2/10  Location: right knee    OBJECTIVE     Objective Measures updated at progress report unless specified.       Treatment     Nisha received the treatments listed below:      THERAPEUTIC EXERCISES to develop strength, endurance, ROM, flexibility, posture, and core stabilization for (12) minutes including:     Intervention Performed Today     Upright Bike x 5 minutes, Level 2   Standing Calf Raises x 3x10 bilateral   Tailgaters  3 minutes 3lbs    Prone Quadriceps Stretch x  2 minutes hold bilateral                                      Plan for Next Visit: progress as tolerated      NEUROMUSCULAR RE-EDUCATION activities to improve: Coordination, Kinesthetic, Sense, and  Proprioception for (24) minutes. The following activities were included: x = exercises performed     Neuromuscular Re-Education 6/14/2023    Clamshells x 3x10 bilateral yellow band   Bridges x 3x10 yellow band   LONG ARC QUADs x 3x10 bilateral 2lbs   Prone Hamstring Curls x 3x10 bilateral no resistance   Straight Leg Raises x 3x10 bilateral no resistance    BOLD= new this visit  Plan for Next Visit: progress as tolerated      THERAPEUTIC ACTIVITIES: to improve functional performance through dynamic activities, for (9)  minutes including:   x = performed today    Therapeutic Activities 6/14/2023    Shuttle Squat x 3 bands, 3x10   Standing TKE's x 3x10 red band bilateral   [place] Squats x 3x10 green band   Prone Hip Extensions x 3x10 bilateral no resistance    BOLD = new this visit  Plan for Next Visit: progress as tolerated     MANUAL THERAPY TECHNIQUES: Joint mobilizations, Soft tissue Mobilization, and mobilization with movement were applied to the area listed below for (0) minutes, including: x = exercises performed     Manual Intervention 6/14/2023    Soft Tissue Mobilization  Right MEDIAL COLLATERAL LIGAMENT and surrounding tissue   Joint Mobilizations     Mobilization with movement     Functional Dry Needling      BOLD = new this visit  Plan for Next Visit: as needed        Patient Education and Home Exercises     Home Exercises Provided and Patient Education Provided     Education provided:   PURPOSE: Patient educated on the impairments noted above and the effects of physical therapy intervention to improve overall condition and QOL.   EXERCISE: Patient was educated on all the above exercise prior/during/after for proper posture, positioning, and execution for safe performance with home exercise program.   STRENGTH: Patient educated on the importance of improved core and extremity strength in order to improve alignment of the spine and extremities with static positions and dynamic movement.   ASSISTIVE  DEVICE: Patient educated on proper utilization of assistive device for safe and efficient ambulation and to reduce risk of falls    Written Home Exercises Provided: Patient instructed to cont prior HEP. Exercises were reviewed and Nisha was able to demonstrate them prior to the end of the session.  Nisha demonstrated good  understanding of the education provided. See EMR under Patient Instructions for exercises provided during therapy sessions    ASSESSMENT   Patient tolerated session well. Patient progressed with strengthening today (see bolded exercises). Patient did report some minor soreness and/or stiffness with some of these exercises, but this was tolerable and patient was able to complete with good form. Plan to continue progressing as tolerated; no other adverse effects noted.    Nisha Is progressing well towards her goals.   Patient prognosis is Good.     Patient will continue to benefit from skilled outpatient physical therapy to address the deficits listed in the problem list box on initial evaluation, provide patient/family education and to maximize patient's level of independence in the home and community environment.     Patient's spiritual, cultural and educational needs considered and patient agreeable to plan of care and goals.     Anticipated barriers to physical therapy: co-morbidities, sedentary lifestyle, chronicity of condition, and transportation    Goals:   Reviewed:6/14/2023       Short Term Goals:  6 weeks Status  Date Met   PAIN: Patient will report worst pain of 5/10 in order to progress toward max functional ability and improve quality of life. [x] Progressing  [] Met  [] Not Met     FUNCTION: Patient will demonstrate improved function as indicated by a functional limitation score of less than or equal to 36 out of 100 on FOTO. [x] Progressing  [] Met  [] Not Met     MOBILITY: Patient will improve Range of Motion to 50% of stated goals, listed in objective measures above, in order to  progress towards independence with functional activities.  [x] Progressing  [] Met  [] Not Met     STRENGTH: Patient will improve strength to 50% of stated goals, listed in objective measures above, in order to progress towards independence with functional activities.  [x] Progressing  [] Met  [] Not Met     HEP: Patient will demonstrate independence with HEP in order to progress toward functional independence. [x] Progressing  [] Met  [] Not Met        Long Term Goals:  12 weeks Status Date Met   PAIN: Patient will report worst pain of 3/10 in order to progress toward max functional ability and improve quality of life [x] Progressing  [] Met  [] Not Met     FUNCTION: Patient will demonstrate improved function as indicated by a functional limitation score of less than or equal to 29 out of 100 on FOTO. [x] Progressing  [] Met  [] Not Met     MOBILITY: Patient will improve Range of Motion to stated goals, listed in objective measures above, in order to return to maximal functional potential and improve quality of life. [x] Progressing  [] Met  [] Not Met     STRENGTH: Patient will improve strength to stated goals, listed in objective measures above, in order to improve functional independence and quality of life. [x] Progressing  [] Met  [] Not Met     GAIT: Patient will demonstrate normalized gait mechanics with minimal compensation in order to return to prior level of function. [x] Progressing  [] Met  [] Not Met     Patient will return to normal ADL's, IADL's, community involvement, recreational activities, and work-related activities with less than or equal to 3/10 pain and maximal function.  [x] Progressing  [] Met  [] Not Met          PLAN     Continue Plan of Care (POC) and progress per patient tolerance. See treatment section for details on planned progressions next session.  Plan of Care Due: 09/22/2023    Lena Downing, PT           10

## 2023-06-16 ENCOUNTER — CLINICAL SUPPORT (OUTPATIENT)
Dept: REHABILITATION | Facility: HOSPITAL | Age: 48
End: 2023-06-16
Payer: COMMERCIAL

## 2023-06-16 DIAGNOSIS — R29.898 DECREASED STRENGTH OF LOWER EXTREMITY: ICD-10-CM

## 2023-06-16 DIAGNOSIS — Z74.09 DECREASED FUNCTIONAL MOBILITY AND ENDURANCE: ICD-10-CM

## 2023-06-16 DIAGNOSIS — M25.661 DECREASED RANGE OF MOTION (ROM) OF RIGHT KNEE: Primary | ICD-10-CM

## 2023-06-16 PROCEDURE — 97530 THERAPEUTIC ACTIVITIES: CPT | Mod: CQ

## 2023-06-16 PROCEDURE — 97112 NEUROMUSCULAR REEDUCATION: CPT | Mod: CQ

## 2023-06-16 PROCEDURE — 97110 THERAPEUTIC EXERCISES: CPT | Mod: CQ

## 2023-06-16 NOTE — PROGRESS NOTES
[facility]   Physical Therapy Treatment Note     Name: Nisha Mancuso  Clinic Number: [id number]    Therapy Diagnosis:   Encounter Diagnoses   Name Primary?    Decreased range of motion (ROM) of right knee Yes    Decreased strength of lower extremity     Decreased functional mobility and endurance      Physician: Nickolas Wolf MD    Visit Date: 6/16/2023    Physician Orders: Physical Therapy Evaluation and Treat  Medical Diagnosis from Referral:   M25.561 (ICD-10-CM) - Right medial knee pain   M23.203 (ICD-10-CM) - Old tear of medial meniscus of right knee, unspecified tear type   Evaluation Date: 5/30/2023  Authorization Period Expiration: 12/31/2023  Plan of Care Expiration: 09/22/2023  Progress Note Due: 06/29/2023  Visit # / Visits authorized: 4/20 (+1 Evaluation)  FOTO: 1/3      Precautions: Multiple Sclerosis     PTA Visit #: 1/5     Time In: 4:15 PM  Time Out: 5:00 PM  Total Billable Time: 44 minutes    SUBJECTIVE     Patient reports: she is having slightly more pain today.     She was compliant with home exercise program.    Response to previous treatment: good    Functional change: pain with squatting, pain with full right knee extension, ascending and descending stairs more often     Pain: 4/10  Location: right knee medial joint line    OBJECTIVE     Objective Measures updated at progress report unless specified.       Treatment     Nisha received the treatments listed below:      THERAPEUTIC EXERCISES to develop strength, endurance, ROM, flexibility, posture, and core stabilization for (13) minutes including:     Intervention Performed Today     Upright Bike x 5 minutes, Level 2   Standing Calf Raises x 3x10 bilateral no hands   Tailgaters x 3 minutes 3 pound bilateral     Prone Quadriceps Stretch   2 minutes hold bilateral                                      Plan for Next Visit: progress as tolerated      NEUROMUSCULAR RE-EDUCATION activities to improve: Coordination,  Kinesthetic, Sense, and Proprioception for (19) minutes. The following activities were included: x = exercises performed     Neuromuscular Re-Education 6/16/2023    Clamshells x 3x10 bilateral yellow band 1-2  hold at end range of motion    Bridges x 3x10 yellow band   LONG ARC QUADs x 3x10 bilateral 2lbs   Prone Hamstring Curls x 3x10 bilateral red band bilateral    Straight Leg Raises x 3x10 bilateral no resistance    BOLD= new this visit  Plan for Next Visit: progress as tolerated      THERAPEUTIC ACTIVITIES: to improve functional performance through dynamic activities, for (13)  minutes including:   x = performed today    Therapeutic Activities 6/16/2023    Shuttle Squat x 3 bands, 3x10   Standing TKE's  3x10 red band bilateral   [place]bian Squats x 3x10 green band   Prone Hip Extensions x 3x10 bilateral no resistance    BOLD = new this visit  Plan for Next Visit: hip abduction, hip series     MANUAL THERAPY TECHNIQUES: Joint mobilizations, Soft tissue Mobilization, and mobilization with movement were applied to the area listed below for (0) minutes, including: x = exercises performed     Manual Intervention 6/16/2023    Soft Tissue Mobilization  Right MEDIAL COLLATERAL LIGAMENT and surrounding tissue   Joint Mobilizations     Mobilization with movement     Functional Dry Needling      BOLD = new this visit  Plan for Next Visit: as needed        Patient Education and Home Exercises     Home Exercises Provided and Patient Education Provided     Education provided:   PURPOSE: Patient educated on the impairments noted above and the effects of physical therapy intervention to improve overall condition and QOL.   EXERCISE: Patient was educated on all the above exercise prior/during/after for proper posture, positioning, and execution for safe performance with home exercise program.   STRENGTH: Patient educated on the importance of improved core and extremity strength in order to improve alignment of the spine and  extremities with static positions and dynamic movement.   ASSISTIVE DEV ICE: Patient educated on proper utilization of assistive device for safe and efficient ambulation and to reduce risk of falls  6/16/2023: added home exercise program     Written Home Exercises Provided: Patient instructed to cont prior HEP. Exercises were reviewed and Nisha was able to demonstrate them prior to the end of the session.  Nisha demonstrated good  understanding of the education provided. See EMR under Patient Instructions for exercises provided during therapy sessions    ASSESSMENT   Patient performed exercises with reports of quad fatigue with [place] squats and noted during this exercises unequal weight on bilateral lower extremities with patient favoring her right lower extremity. She is also noted to have bilateral hip and hamstring weakness right worse compared to left. Assistance was required for her to position herself correctly to perform side lying clams enabling her to strengthen the target muscles.        Nisha Is progressing well towards her goals.   Patient prognosis is Good.     Patient will continue to benefit from skilled outpatient physical therapy to address the deficits listed in the problem list box on initial evaluation, provide patient/family education and to maximize patient's level of independence in the home and community environment.     Patient's spiritual, cultural and educational needs considered and patient agreeable to plan of care and goals.     Anticipated barriers to physical therapy: co-morbidities, sedentary lifestyle, chronicity of condition, and transportation    Goals:   Reviewed:6/16/2023       Short Term Goals:  6 weeks Status  Date Met   PAIN: Patient will report worst pain of 5/10 in order to progress toward max functional ability and improve quality of life. [x] Progressing  [] Met  [] Not Met     FUNCTION: Patient will demonstrate improved function as indicated by a functional  limitation score of less than or equal to 36 out of 100 on FOTO. [x] Progressing  [] Met  [] Not Met     MOBILITY: Patient will improve Range of Motion to 50% of stated goals, listed in objective measures above, in order to progress towards independence with functional activities.  [x] Progressing  [] Met  [] Not Met     STRENGTH: Patient will improve strength to 50% of stated goals, listed in objective measures above, in order to progress towards independence with functional activities.  [x] Progressing  [] Met  [] Not Met     HEP: Patient will demonstrate independence with HEP in order to progress toward functional independence. [x] Progressing  [] Met  [] Not Met        Long Term Goals:  12 weeks Status Date Met   PAIN: Patient will report worst pain of 3/10 in order to progress toward max functional ability and improve quality of life [x] Progressing  [] Met  [] Not Met     FUNCTION: Patient will demonstrate improved function as indicated by a functional limitation score of less than or equal to 29 out of 100 on FOTO. [x] Progressing  [] Met  [] Not Met     MOBILITY: Patient will improve Range of Motion to stated goals, listed in objective measures above, in order to return to maximal functional potential and improve quality of life. [x] Progressing  [] Met  [] Not Met     STRENGTH: Patient will improve strength to stated goals, listed in objective measures above, in order to improve functional independence and quality of life. [x] Progressing  [] Met  [] Not Met     GAIT: Patient will demonstrate normalized gait mechanics with minimal compensation in order to return to prior level of function. [x] Progressing  [] Met  [] Not Met     Patient will return to normal ADL's, IADL's, community involvement, recreational activities, and work-related activities with less than or equal to 3/10 pain and maximal function.  [x] Progressing  [] Met  [] Not Met          PLAN     Continue Plan of Care (POC) and progress per  patient tolerance. See treatment section for details on planned progressions next session.  Plan of Care Due: 09/22/2023    Kenny Ayala, CÉSAR

## 2023-06-22 ENCOUNTER — PATIENT MESSAGE (OUTPATIENT)
Dept: SPORTS MEDICINE | Facility: CLINIC | Age: 48
End: 2023-06-22
Payer: COMMERCIAL

## 2023-06-22 ENCOUNTER — TELEPHONE (OUTPATIENT)
Dept: SPORTS MEDICINE | Facility: CLINIC | Age: 48
End: 2023-06-22
Payer: COMMERCIAL

## 2023-06-23 ENCOUNTER — CLINICAL SUPPORT (OUTPATIENT)
Dept: REHABILITATION | Facility: HOSPITAL | Age: 48
End: 2023-06-23
Payer: COMMERCIAL

## 2023-06-23 DIAGNOSIS — M25.661 DECREASED RANGE OF MOTION (ROM) OF RIGHT KNEE: Primary | ICD-10-CM

## 2023-06-23 DIAGNOSIS — Z74.09 DECREASED FUNCTIONAL MOBILITY AND ENDURANCE: ICD-10-CM

## 2023-06-23 DIAGNOSIS — R29.898 DECREASED STRENGTH OF LOWER EXTREMITY: ICD-10-CM

## 2023-06-23 PROCEDURE — 97112 NEUROMUSCULAR REEDUCATION: CPT

## 2023-06-23 PROCEDURE — 97110 THERAPEUTIC EXERCISES: CPT

## 2023-06-23 PROCEDURE — 97530 THERAPEUTIC ACTIVITIES: CPT

## 2023-06-23 NOTE — PROGRESS NOTES
[facility] [facility]   Physical Therapy Treatment Note     Name: Nisha Mancuso  Clinic Number: [id number]    Therapy Diagnosis:   Encounter Diagnoses   Name Primary?    Decreased range of motion (ROM) of right knee Yes    Decreased strength of lower extremity     Decreased functional mobility and endurance      Physician: Nickolas Wolf MD    Visit Date: 6/23/2023    Physician Orders: Physical Therapy Evaluation and Treat  Medical Diagnosis from Referral:   M25.561 (ICD-10-CM) - Right medial knee pain   M23.203 (ICD-10-CM) - Old tear of medial meniscus of right knee, unspecified tear type   Evaluation Date: 5/30/2023  Authorization Period Expiration: 12/31/2023  Plan of Care Expiration: 09/22/2023  Progress Note Due: 06/29/2023  Visit # / Visits authorized: 5/20 (+1 Evaluation)  FOTO: 1/3      Precautions: Multiple Sclerosis     PTA Visit #: 0/5     Time In: 4:15 PM  Time Out: 5:00 PM  Total Billable Time: 43 minutes    SUBJECTIVE     Patient reports: she is feeling a lot better today and is doing her HOME EXERCISE PROGRAM. Patient states she knows her knee is getting better.    She was compliant with home exercise program.    Response to previous treatment: good    Functional change: pain with squatting, pain with full right knee extension, ascending and descending stairs more often     Pain: 4/10  Location: right knee medial joint line    OBJECTIVE     Objective Measures updated at progress report unless specified.       Treatment     Nisha received the treatments listed below:      THERAPEUTIC EXERCISES to develop strength, endurance, ROM, flexibility, posture, and core stabilization for (8) minutes including:     Intervention Performed Today     Upright Bike x 5 minutes, Level 2   Standing Calf Raises x 3x10 bilateral no hands   Tailgaters   3 minutes 3 pound bilateral     Prone Quadriceps Stretch   2 minutes hold bilateral                                      Plan for Next Visit: progress  as tolerated      NEUROMUSCULAR RE-EDUCATION activities to improve: Coordination, Kinesthetic, Sense, and Proprioception for (27) minutes. The following activities were included: x = exercises performed     Neuromuscular Re-Education 6/23/2023    Clamshells x 3x10 bilateral red band 1-2  hold at end range of motion    Bridges x 3x10 red band   LONG ARC QUADs x 3x10 bilateral 4lbs   Prone Hamstring Curls  3x10 bilateral red band bilateral    Straight Leg Raises x 3x10 bilateral no resistance    BOLD= new this visit  Plan for Next Visit: progress as tolerated      THERAPEUTIC ACTIVITIES: to improve functional performance through dynamic activities, for (8)  minutes including:   x = performed today    Therapeutic Activities 6/23/2023    Shuttle Squat x 5 bands, 3x10   Standing TKE's  3x10 red band bilateral   [place]enian Squats  3x10 green band   Prone Hip Extensions x 3x10 bilateral no resistance    BOLD = new this visit  Plan for Next Visit: hip abduction, hip series     MANUAL THERAPY TECHNIQUES: Joint mobilizations, Soft tissue Mobilization, and mobilization with movement were applied to the area listed below for (0) minutes, including: x = exercises performed     Manual Intervention 6/23/2023    Soft Tissue Mobilization  Right MEDIAL COLLATERAL LIGAMENT and surrounding tissue   Joint Mobilizations     Mobilization with movement     Functional Dry Needling      BOLD = new this visit  Plan for Next Visit: as needed        Patient Education and Home Exercises     Home Exercises Provided and Patient Education Provided     Education provided:   PURPOSE: Patient educated on the impairments noted above and the effects of physical therapy intervention to improve overall condition and QOL.   EXERCISE: Patient was educated on all the above exercise prior/during/after for proper posture, positioning, and execution for safe performance with home exercise program.   STRENGTH: Patient educated on the importance of improved core and  extremity strength in order to improve alignment of the spine and extremities with static positions and dynamic movement.   ASSISTIVE DEV ICE: Patient educated on proper utilization of assistive device for safe and efficient ambulation and to reduce risk of falls  6/16/2023: added home exercise program     Written Home Exercises Provided: Patient instructed to cont prior HEP. Exercises were reviewed and Nisha was able to demonstrate them prior to the end of the session.  Nisha demonstrated good  understanding of the education provided. See EMR under Patient Instructions for exercises provided during therapy sessions    ASSESSMENT   Patient tolerated session well and was able to be progressed with clamshells and straight leg raises. Plan to continue monitoring symptoms and progressing as tolerated. No adverse effects noted.    Nisha Is progressing well towards her goals.   Patient prognosis is Good.     Patient will continue to benefit from skilled outpatient physical therapy to address the deficits listed in the problem list box on initial evaluation, provide patient/family education and to maximize patient's level of independence in the home and community environment.     Patient's spiritual, cultural and educational needs considered and patient agreeable to plan of care and goals.     Anticipated barriers to physical therapy: co-morbidities, sedentary lifestyle, chronicity of condition, and transportation    Goals:   Reviewed:6/23/2023       Short Term Goals:  6 weeks Status  Date Met   PAIN: Patient will report worst pain of 5/10 in order to progress toward max functional ability and improve quality of life. [x] Progressing  [] Met  [] Not Met     FUNCTION: Patient will demonstrate improved function as indicated by a functional limitation score of less than or equal to 36 out of 100 on FOTO. [x] Progressing  [] Met  [] Not Met     MOBILITY: Patient will improve Range of Motion to 50% of stated goals, listed in  objective measures above, in order to progress towards independence with functional activities.  [x] Progressing  [] Met  [] Not Met     STRENGTH: Patient will improve strength to 50% of stated goals, listed in objective measures above, in order to progress towards independence with functional activities.  [x] Progressing  [] Met  [] Not Met     HEP: Patient will demonstrate independence with HEP in order to progress toward functional independence. [x] Progressing  [] Met  [] Not Met        Long Term Goals:  12 weeks Status Date Met   PAIN: Patient will report worst pain of 3/10 in order to progress toward max functional ability and improve quality of life [x] Progressing  [] Met  [] Not Met     FUNCTION: Patient will demonstrate improved function as indicated by a functional limitation score of less than or equal to 29 out of 100 on FOTO. [x] Progressing  [] Met  [] Not Met     MOBILITY: Patient will improve Range of Motion to stated goals, listed in objective measures above, in order to return to maximal functional potential and improve quality of life. [x] Progressing  [] Met  [] Not Met     STRENGTH: Patient will improve strength to stated goals, listed in objective measures above, in order to improve functional independence and quality of life. [x] Progressing  [] Met  [] Not Met     GAIT: Patient will demonstrate normalized gait mechanics with minimal compensation in order to return to prior level of function. [x] Progressing  [] Met  [] Not Met     Patient will return to normal ADL's, IADL's, community involvement, recreational activities, and work-related activities with less than or equal to 3/10 pain and maximal function.  [x] Progressing  [] Met  [] Not Met          PLAN     Continue Plan of Care (POC) and progress per patient tolerance. See treatment section for details on planned progressions next session.  Plan of Care Due: 09/22/2023    Lena Downing, PT

## 2023-06-30 ENCOUNTER — CLINICAL SUPPORT (OUTPATIENT)
Dept: REHABILITATION | Facility: HOSPITAL | Age: 48
End: 2023-06-30
Payer: COMMERCIAL

## 2023-06-30 DIAGNOSIS — Z74.09 DECREASED FUNCTIONAL MOBILITY AND ENDURANCE: ICD-10-CM

## 2023-06-30 DIAGNOSIS — R29.898 DECREASED STRENGTH OF LOWER EXTREMITY: ICD-10-CM

## 2023-06-30 DIAGNOSIS — M25.661 DECREASED RANGE OF MOTION (ROM) OF RIGHT KNEE: Primary | ICD-10-CM

## 2023-06-30 PROCEDURE — 97530 THERAPEUTIC ACTIVITIES: CPT

## 2023-06-30 PROCEDURE — 97110 THERAPEUTIC EXERCISES: CPT

## 2023-06-30 PROCEDURE — 97112 NEUROMUSCULAR REEDUCATION: CPT

## 2023-06-30 NOTE — PROGRESS NOTES
[facility]   Physical Therapy Treatment Note + Progress Report    Name: Nisha Mancuso  Clinic Number: [id number]    Therapy Diagnosis:   Encounter Diagnoses   Name Primary?    Decreased range of motion (ROM) of right knee Yes    Decreased strength of lower extremity     Decreased functional mobility and endurance      Physician: Nickolas Wolf MD    Visit Date: 6/30/2023    Physician Orders: Physical Therapy Evaluation and Treat  Medical Diagnosis from Referral:   M25.561 (ICD-10-CM) - Right medial knee pain   M23.203 (ICD-10-CM) - Old tear of medial meniscus of right knee, unspecified tear type   Evaluation Date: 5/30/2023  Authorization Period Expiration: 12/31/2023  Plan of Care Expiration: 09/22/2023  Progress Note Due: 07/30/2023  Visit # / Visits authorized: 6/20 (+1 Evaluation)  FOTO: 2/3      Precautions: Multiple Sclerosis     PTA Visit #: 0/5     Time In: 4:15 PM  Time Out: 5:00 PM  Total Billable Time: 45 minutes    SUBJECTIVE     Patient reports: she knows she has made a lot of improvement since starting PHYSICAL THERAPY, however, she is planning another trip to [place] in August and would like to feel confident she could walk that long without knee pain.    She was compliant with home exercise program.    Response to previous treatment: good    Functional change: pain with squatting, pain with full right knee extension, ascending and descending stairs more often     Pain: 4/10  Location: right knee medial joint line    OBJECTIVE     Objective Measures updated at progress report unless specified.     *denotes changes since initial evaluation     Gait Analysis: The patient ambulated with the following assistive device: none; the patient presents with the following gait abnormalities: WITHIN FUNCTIONAL LIMITS*        Functional Movement  Analysis   Squat Painful  and Dysfunctional (mild)*   Sit to Stand Non painful and functional*      RANGE OF MOTION:     Knee AROM/PROM  Right Left Pain/Dysfunction with Movement Goal   Knee Flexion (135) 125* WITHIN FUNCTIONAL LIMITS    120 bilateral   Knee Extension (0) 0* WITHIN FUNCTIONAL LIMITS    0 bilateral      Bilateral Hip and Ankle WITHIN FUNCTIONAL LIMITS      STRENGTH:     L/E MMT Right    Left Pain/Dysfunction with Movement Goal   Hip Flexion  4+/5* 4+/5*   4+/5 Bilateral   Hip Extension  4/5* 4+/5*  4+/5 Bilateral   Hip Abduction  4/5* 4+/5*  4+/5 Bilateral   Knee Extension 4/5* 5/5*  5/5 Bilateral   Knee Flexion 5/5* 5/5*   5/5 Bilateral   Ankle Dorsiflexion 5/5 5/5   5/5 Bilateral   Ankle Plantarflexion 5/5 5/5   5/5 Bilateral         FUNCTION:     CMS Impairment/Limitation/Restriction for FOTO Knee Muscle/Tendon Survey     Therapist reviewed FOTO scores for Nisha on 5/30/2023.   FOTO documents entered into [employer] - see Media section.     Limitation Score: 35%*           Treatment     Nisha received the treatments listed below:      THERAPEUTIC EXERCISES to develop strength, endurance, ROM, flexibility, posture, and core stabilization for (25) minutes including:     Intervention Performed Today     Upright Bike x 5 minutes, Level 2   Standing Calf Raises x 3x10 bilateral no hands   Tailgaters   3 minutes 3 pound bilateral    Prone Quadriceps Stretch   2 minutes hold bilateral    Objectives Re-assessed   x  15 minutes                              Plan for Next Visit: progress as tolerated      NEUROMUSCULAR RE-EDUCATION activities to improve: Coordination, Kinesthetic, Sense, and Proprioception for (12) minutes. The following activities were included: x = exercises performed     Neuromuscular Re-Education 6/30/2023    Clamshells x 3x10 bilateral red band 1-2  hold at end range of motion    Bridges x 3x10 red band   LONG ARC QUADs x 3x10 bilateral 4lbs   Prone Hamstring Curls  3x10 bilateral red band bilateral    Straight Leg Raises x 3x10 bilateral no resistance    BOLD= new this visit  Plan for Next Visit: progress as tolerated       THERAPEUTIC ACTIVITIES: to improve functional performance through dynamic activities, for (8)  minutes including:   x = performed today    Therapeutic Activities 6/30/2023    Shuttle Squat x 5 bands, 3x10   Standing TKE's  3x10 red band bilateral   [place] Squats  3x10 green band   Prone Hip Extensions x 3x10 bilateral no resistance    BOLD = new this visit  Plan for Next Visit: hip abduction, hip series     MANUAL THERAPY TECHNIQUES: Joint mobilizations, Soft tissue Mobilization, and mobilization with movement were applied to the area listed below for (0) minutes, including: x = exercises performed     Manual Intervention 6/30/2023    Soft Tissue Mobilization  Right MEDIAL COLLATERAL LIGAMENT and surrounding tissue   Joint Mobilizations     Mobilization with movement     Functional Dry Needling      BOLD = new this visit  Plan for Next Visit: as needed        Patient Education and Home Exercises     Home Exercises Provided and Patient Education Provided     Education provided:   PURPOSE: Patient educated on the impairments noted above and the effects of physical therapy intervention to improve overall condition and QOL.   EXERCISE: Patient was educated on all the above exercise prior/during/after for proper posture, positioning, and execution for safe performance with home exercise program.   STRENGTH: Patient educated on the importance of improved core and extremity strength in order to improve alignment of the spine and extremities with static positions and dynamic movement.   ASSISTIVE DEV ICE: Patient educated on proper utilization of assistive device for safe and efficient ambulation and to reduce risk of falls  6/16/2023: added home exercise program     Written Home Exercises Provided: Patient instructed to cont prior HEP. Exercises were reviewed and Nisha was able to demonstrate them prior to the end of the session.  Nisha demonstrated good  understanding of the education provided. See EMR under Patient  Instructions for exercises provided during therapy sessions    ASSESSMENT   Since beginning PHYSICAL THERAPY patient demonstrates significant improvements in right knee ACTIVE RANGE OF MOTION as well as moderate to significant improvements in bilateral hip flexion, extension, abduction strength, and bilateral knee flexion/extension strength. Patient also demonstrates moderate improvements in function per FOTO scores as well as functional tests such as sit to stands and squats. Patient still presents with some right knee medial pain and uncertainty to walk long distances as well as still presents with some minor strength deficits. Patient would continue to benefit from skilled therapy to address these remaining deficits and allow patient to return to prior level of function.    Nisha Is progressing well towards her goals.   Patient prognosis is Good.     Patient will continue to benefit from skilled outpatient physical therapy to address the deficits listed in the problem list box on initial evaluation, provide patient/family education and to maximize patient's level of independence in the home and community environment.     Patient's spiritual, cultural and educational needs considered and patient agreeable to plan of care and goals.     Anticipated barriers to physical therapy: co-morbidities, sedentary lifestyle, chronicity of condition, and transportation    Goals:   Reviewed:6/30/2023       Short Term Goals:  6 weeks Status  Date Met   PAIN: Patient will report worst pain of 5/10 in order to progress toward max functional ability and improve quality of life. [] Progressing  [x] Met  [] Not Met  06/30/2023   FUNCTION: Patient will demonstrate improved function as indicated by a functional limitation score of less than or equal to 36 out of 100 on FOTO. [] Progressing  [x] Met  [] Not Met  06/30/2023    MOBILITY: Patient will improve Range of Motion to 50% of stated goals, listed in objective measures above, in  order to progress towards independence with functional activities.  [] Progressing  [x] Met  [] Not Met   06/30/2023   STRENGTH: Patient will improve strength to 50% of stated goals, listed in objective measures above, in order to progress towards independence with functional activities.  [] Progressing  [x] Met  [] Not Met   06/30/2023   HEP: Patient will demonstrate independence with HEP in order to progress toward functional independence. [] Progressing  [x] Met  [] Not Met  06/30/2023       Long Term Goals:  12 weeks Status Date Met   PAIN: Patient will report worst pain of 3/10 in order to progress toward max functional ability and improve quality of life [x] Progressing  [] Met  [] Not Met     FUNCTION: Patient will demonstrate improved function as indicated by a functional limitation score of less than or equal to 29 out of 100 on FOTO. [x] Progressing  [] Met  [] Not Met     MOBILITY: Patient will improve Range of Motion to stated goals, listed in objective measures above, in order to return to maximal functional potential and improve quality of life. [x] Progressing  [] Met  [] Not Met     STRENGTH: Patient will improve strength to stated goals, listed in objective measures above, in order to improve functional independence and quality of life. [x] Progressing  [] Met  [] Not Met     GAIT: Patient will demonstrate normalized gait mechanics with minimal compensation in order to return to prior level of function. [x] Progressing  [] Met  [] Not Met     Patient will return to normal ADL's, IADL's, community involvement, recreational activities, and work-related activities with less than or equal to 3/10 pain and maximal function.  [x] Progressing  [] Met  [] Not Met          PLAN     Continue Plan of Care (POC) and progress per patient tolerance. See treatment section for details on planned progressions next session.  Plan of Care Due: 09/22/2023    Lena Downing, PT

## 2023-06-30 NOTE — PLAN OF CARE
[facility]   Physical Therapy Treatment Note + Progress Report    Name: Nisha Mancuso  Clinic Number: [id number]    Therapy Diagnosis:   Encounter Diagnoses   Name Primary?    Decreased range of motion (ROM) of right knee Yes    Decreased strength of lower extremity     Decreased functional mobility and endurance      Physician: Nickolas Wolf MD    Visit Date: 6/30/2023    Physician Orders: Physical Therapy Evaluation and Treat  Medical Diagnosis from Referral:   M25.561 (ICD-10-CM) - Right medial knee pain   M23.203 (ICD-10-CM) - Old tear of medial meniscus of right knee, unspecified tear type   Evaluation Date: 5/30/2023  Authorization Period Expiration: 12/31/2023  Plan of Care Expiration: 09/22/2023  Progress Note Due: 07/30/2023  Visit # / Visits authorized: 6/20 (+1 Evaluation)  FOTO: 2/3      Precautions: Multiple Sclerosis     PTA Visit #: 0/5     Time In: 4:15 PM  Time Out: 5:00 PM  Total Billable Time: 45 minutes    SUBJECTIVE     Patient reports: she knows she has made a lot of improvement since starting PHYSICAL THERAPY, however, she is planning another trip to [place] in August and would like to feel confident she could walk that long without knee pain.    She was compliant with home exercise program.    Response to previous treatment: good    Functional change: pain with squatting, pain with full right knee extension, ascending and descending stairs more often     Pain: 4/10  Location: right knee medial joint line    OBJECTIVE     Objective Measures updated at progress report unless specified.     *denotes changes since initial evaluation     Gait Analysis: The patient ambulated with the following assistive device: none; the patient presents with the following gait abnormalities: WITHIN FUNCTIONAL LIMITS*        Functional Movement  Analysis   Squat Painful  and Dysfunctional (mild)*   Sit to Stand Non painful and functional*      RANGE OF MOTION:     Knee AROM/PROM  Right Left Pain/Dysfunction with Movement Goal   Knee Flexion (135) 125* WITHIN FUNCTIONAL LIMITS    120 bilateral   Knee Extension (0) 0* WITHIN FUNCTIONAL LIMITS    0 bilateral      Bilateral Hip and Ankle WITHIN FUNCTIONAL LIMITS      STRENGTH:     L/E MMT Right    Left Pain/Dysfunction with Movement Goal   Hip Flexion  4+/5* 4+/5*   4+/5 Bilateral   Hip Extension  4/5* 4+/5*  4+/5 Bilateral   Hip Abduction  4/5* 4+/5*  4+/5 Bilateral   Knee Extension 4/5* 5/5*  5/5 Bilateral   Knee Flexion 5/5* 5/5*   5/5 Bilateral   Ankle Dorsiflexion 5/5 5/5   5/5 Bilateral   Ankle Plantarflexion 5/5 5/5   5/5 Bilateral         FUNCTION:     CMS Impairment/Limitation/Restriction for FOTO Knee Muscle/Tendon Survey     Therapist reviewed FOTO scores for Nisha on 5/30/2023.   FOTO documents entered into [employer] - see Media section.     Limitation Score: 35%*           Treatment     Nisha received the treatments listed below:      THERAPEUTIC EXERCISES to develop strength, endurance, ROM, flexibility, posture, and core stabilization for (25) minutes including:     Intervention Performed Today     Upright Bike x 5 minutes, Level 2   Standing Calf Raises x 3x10 bilateral no hands   Tailgaters   3 minutes 3 pound bilateral    Prone Quadriceps Stretch   2 minutes hold bilateral    Objectives Re-assessed   x  15 minutes                              Plan for Next Visit: progress as tolerated      NEUROMUSCULAR RE-EDUCATION activities to improve: Coordination, Kinesthetic, Sense, and Proprioception for (12) minutes. The following activities were included: x = exercises performed     Neuromuscular Re-Education 6/30/2023    Clamshells x 3x10 bilateral red band 1-2  hold at end range of motion    Bridges x 3x10 red band   LONG ARC QUADs x 3x10 bilateral 4lbs   Prone Hamstring Curls  3x10 bilateral red band bilateral    Straight Leg Raises x 3x10 bilateral no resistance    BOLD= new this visit  Plan for Next Visit: progress as tolerated       THERAPEUTIC ACTIVITIES: to improve functional performance through dynamic activities, for (8)  minutes including:   x = performed today    Therapeutic Activities 6/30/2023    Shuttle Squat x 5 bands, 3x10   Standing TKE's  3x10 red band bilateral   [place] Squats  3x10 green band   Prone Hip Extensions x 3x10 bilateral no resistance    BOLD = new this visit  Plan for Next Visit: hip abduction, hip series     MANUAL THERAPY TECHNIQUES: Joint mobilizations, Soft tissue Mobilization, and mobilization with movement were applied to the area listed below for (0) minutes, including: x = exercises performed     Manual Intervention 6/30/2023    Soft Tissue Mobilization  Right MEDIAL COLLATERAL LIGAMENT and surrounding tissue   Joint Mobilizations     Mobilization with movement     Functional Dry Needling      BOLD = new this visit  Plan for Next Visit: as needed        Patient Education and Home Exercises     Home Exercises Provided and Patient Education Provided     Education provided:   PURPOSE: Patient educated on the impairments noted above and the effects of physical therapy intervention to improve overall condition and QOL.   EXERCISE: Patient was educated on all the above exercise prior/during/after for proper posture, positioning, and execution for safe performance with home exercise program.   STRENGTH: Patient educated on the importance of improved core and extremity strength in order to improve alignment of the spine and extremities with static positions and dynamic movement.   ASSISTIVE DEV ICE: Patient educated on proper utilization of assistive device for safe and efficient ambulation and to reduce risk of falls  6/16/2023: added home exercise program     Written Home Exercises Provided: Patient instructed to cont prior HEP. Exercises were reviewed and Nisha was able to demonstrate them prior to the end of the session.  Nisha demonstrated good  understanding of the education provided. See EMR under Patient  Instructions for exercises provided during therapy sessions    ASSESSMENT   Since beginning PHYSICAL THERAPY patient demonstrates significant improvements in right knee ACTIVE RANGE OF MOTION as well as moderate to significant improvements in bilateral hip flexion, extension, abduction strength, and bilateral knee flexion/extension strength. Patient also demonstrates moderate improvements in function per FOTO scores as well as functional tests such as sit to stands and squats. Patient still presents with some right knee medial pain and uncertainty to walk long distances as well as still presents with some minor strength deficits. Patient would continue to benefit from skilled therapy to address these remaining deficits and allow patient to return to prior level of function.    Nisha Is progressing well towards her goals.   Patient prognosis is Good.     Patient will continue to benefit from skilled outpatient physical therapy to address the deficits listed in the problem list box on initial evaluation, provide patient/family education and to maximize patient's level of independence in the home and community environment.     Patient's spiritual, cultural and educational needs considered and patient agreeable to plan of care and goals.     Anticipated barriers to physical therapy: co-morbidities, sedentary lifestyle, chronicity of condition, and transportation    Goals:   Reviewed:6/30/2023       Short Term Goals:  6 weeks Status  Date Met   PAIN: Patient will report worst pain of 5/10 in order to progress toward max functional ability and improve quality of life. [] Progressing  [x] Met  [] Not Met  06/30/2023   FUNCTION: Patient will demonstrate improved function as indicated by a functional limitation score of less than or equal to 36 out of 100 on FOTO. [] Progressing  [x] Met  [] Not Met  06/30/2023    MOBILITY: Patient will improve Range of Motion to 50% of stated goals, listed in objective measures above, in  order to progress towards independence with functional activities.  [] Progressing  [x] Met  [] Not Met   06/30/2023   STRENGTH: Patient will improve strength to 50% of stated goals, listed in objective measures above, in order to progress towards independence with functional activities.  [] Progressing  [x] Met  [] Not Met   06/30/2023   HEP: Patient will demonstrate independence with HEP in order to progress toward functional independence. [] Progressing  [x] Met  [] Not Met  06/30/2023       Long Term Goals:  12 weeks Status Date Met   PAIN: Patient will report worst pain of 3/10 in order to progress toward max functional ability and improve quality of life [x] Progressing  [] Met  [] Not Met     FUNCTION: Patient will demonstrate improved function as indicated by a functional limitation score of less than or equal to 29 out of 100 on FOTO. [x] Progressing  [] Met  [] Not Met     MOBILITY: Patient will improve Range of Motion to stated goals, listed in objective measures above, in order to return to maximal functional potential and improve quality of life. [x] Progressing  [] Met  [] Not Met     STRENGTH: Patient will improve strength to stated goals, listed in objective measures above, in order to improve functional independence and quality of life. [x] Progressing  [] Met  [] Not Met     GAIT: Patient will demonstrate normalized gait mechanics with minimal compensation in order to return to prior level of function. [x] Progressing  [] Met  [] Not Met     Patient will return to normal ADL's, IADL's, community involvement, recreational activities, and work-related activities with less than or equal to 3/10 pain and maximal function.  [x] Progressing  [] Met  [] Not Met          PLAN     Continue Plan of Care (POC) and progress per patient tolerance. See treatment section for details on planned progressions next session.  Plan of Care Due: 09/22/2023    Lena Downing, PT

## 2023-07-03 ENCOUNTER — DOCUMENTATION ONLY (OUTPATIENT)
Dept: REHABILITATION | Facility: HOSPITAL | Age: 48
End: 2023-07-03
Payer: COMMERCIAL

## 2023-07-03 NOTE — PROGRESS NOTES
PT/PTA met face to face to discuss pt's treatment plan and progress towards established goals. Pt will be seen by a physical therapist minimally every 6th visit or every 30 days.        Kenny Ayala PTA

## 2023-07-05 ENCOUNTER — CLINICAL SUPPORT (OUTPATIENT)
Dept: REHABILITATION | Facility: HOSPITAL | Age: 48
End: 2023-07-05
Payer: COMMERCIAL

## 2023-07-05 DIAGNOSIS — Z74.09 DECREASED FUNCTIONAL MOBILITY AND ENDURANCE: ICD-10-CM

## 2023-07-05 DIAGNOSIS — M25.661 DECREASED RANGE OF MOTION (ROM) OF RIGHT KNEE: Primary | ICD-10-CM

## 2023-07-05 DIAGNOSIS — R29.898 DECREASED STRENGTH OF LOWER EXTREMITY: ICD-10-CM

## 2023-07-05 PROCEDURE — 97530 THERAPEUTIC ACTIVITIES: CPT

## 2023-07-05 PROCEDURE — 97110 THERAPEUTIC EXERCISES: CPT

## 2023-07-05 PROCEDURE — 97112 NEUROMUSCULAR REEDUCATION: CPT

## 2023-07-05 NOTE — PROGRESS NOTES
[facility]   Physical Therapy Treatment Note     Name: Nisha Mancuso  Clinic Number: [id number]    Therapy Diagnosis:   Encounter Diagnoses   Name Primary?    Decreased range of motion (ROM) of right knee Yes    Decreased strength of lower extremity     Decreased functional mobility and endurance      Physician: Nickolas Wolf MD    Visit Date: 7/5/2023    Physician Orders: Physical Therapy Evaluation and Treat  Medical Diagnosis from Referral:   M25.561 (ICD-10-CM) - Right medial knee pain   M23.203 (ICD-10-CM) - Old tear of medial meniscus of right knee, unspecified tear type   Evaluation Date: 5/30/2023  Authorization Period Expiration: 12/31/2023  Plan of Care Expiration: 09/22/2023  Progress Note Due: 07/30/2023  Visit # / Visits authorized: 7/20 (+1 Evaluation)  FOTO: 2/3      Precautions: Multiple Sclerosis     PTA Visit #: 0/5     Time In: 7:00 AM  Time Out: 7:42 AM  Total Billable Time: 42 minutes    SUBJECTIVE     Patient reports: she is doing about the same today and is a little tired since she is coming first thing in the morning.     She was compliant with home exercise program.    Response to previous treatment: good    Functional change: pain with squatting, pain with full right knee extension, ascending and descending stairs more often     Pain: not tested/10  Location: right knee medial joint line    OBJECTIVE     Objective Measures updated at progress report unless specified.       Treatment     Nisha received the treatments listed below:      THERAPEUTIC EXERCISES to develop strength, endurance, ROM, flexibility, posture, and core stabilization for (10) minutes including:     Intervention Performed Today     Treadmill  x 8 minutes 1.2 mph   Upright Bike  5 minutes, Level 2   Standing Calf Raises x 3x10 bilateral at stairs   Tailgaters   3 minutes 3 pound bilateral    Prone Quadriceps Stretch   2 minutes hold bilateral   Objectives Re-assessed     15 minutes                               Plan for Next Visit: progress as tolerated      NEUROMUSCULAR RE-EDUCATION activities to improve: Coordination, Kinesthetic, Sense, and Proprioception for (24) minutes. The following activities were included: x = exercises performed     Neuromuscular Re-Education 7/5/2023    Clamshells x 3x10 bilateral red band 1-2  hold at end range of motion    Bridges x 3x10 red band   LONG ARC QUADs x 3x10 bilateral 5lbs   Prone Hamstring Curls  3x10 bilateral red band bilateral    Straight Leg Raises x 3x10 bilateral 1lbs   Bosu Squats x 3x10 with upper extremity support at parallel bars    BOLD= new this visit  Plan for Next Visit: progress as tolerated      THERAPEUTIC ACTIVITIES: to improve functional performance through dynamic activities, for (8)  minutes including:   x = performed today    Therapeutic Activities 7/5/2023    Shuttle Squat x 6 bands, 3x10, extra rest breaks   Standing TKE's  3x10 red band bilateral   [place] Squats  3x10 green band   Prone Hip Extensions x 3x10 bilateral no resistance    BOLD = new this visit  Plan for Next Visit: hip abduction, hip series     MANUAL THERAPY TECHNIQUES: Joint mobilizations, Soft tissue Mobilization, and mobilization with movement were applied to the area listed below for (0) minutes, including: x = exercises performed     Manual Intervention 7/5/2023    Soft Tissue Mobilization  Right MEDIAL COLLATERAL LIGAMENT and surrounding tissue   Joint Mobilizations     Mobilization with movement     Functional Dry Needling      BOLD = new this visit  Plan for Next Visit: as needed        Patient Education and Home Exercises     Home Exercises Provided and Patient Education Provided     Education provided:   PURPOSE: Patient educated on the impairments noted above and the effects of physical therapy intervention to improve overall condition and QOL.   EXERCISE: Patient was educated on all the above exercise prior/during/after for proper posture, positioning, and  execution for safe performance with home exercise program.   STRENGTH: Patient educated on the importance of improved core and extremity strength in order to improve alignment of the spine and extremities with static positions and dynamic movement.   ASSISTIVE DEV ICE: Patient educated on proper utilization of assistive device for safe and efficient ambulation and to reduce risk of falls  6/16/2023: added home exercise program     Written Home Exercises Provided: Patient instructed to cont prior HEP. Exercises were reviewed and Nisha was able to demonstrate them prior to the end of the session.  Nisha demonstrated good  understanding of the education provided. See EMR under Patient Instructions for exercises provided during therapy sessions    ASSESSMENT   Patient tolerated session well. Performed treadmill walking but patient did struggle due to vertigo but insisted on completing task. Patient denied being dizzy or nauseous. Plan to defer this treatment next visit and return to bike. Also progressed patient with bosu squats to challenge neuromuscular system and increased resistance on shuttle squats, long arc quads, and straight leg raises with good tolerance. Plan to continue progressing as tolerated. No other adverse effects noted.    Nisha Is progressing well towards her goals.   Patient prognosis is Good.     Patient will continue to benefit from skilled outpatient physical therapy to address the deficits listed in the problem list box on initial evaluation, provide patient/family education and to maximize patient's level of independence in the home and community environment.     Patient's spiritual, cultural and educational needs considered and patient agreeable to plan of care and goals.     Anticipated barriers to physical therapy: co-morbidities, sedentary lifestyle, chronicity of condition, and transportation    Goals:   Reviewed:7/5/2023       Short Term Goals:  6 weeks Status  Date Met   PAIN: Patient  will report worst pain of 5/10 in order to progress toward max functional ability and improve quality of life. [] Progressing  [x] Met  [] Not Met  06/30/2023   FUNCTION: Patient will demonstrate improved function as indicated by a functional limitation score of less than or equal to 36 out of 100 on FOTO. [] Progressing  [x] Met  [] Not Met  06/30/2023    MOBILITY: Patient will improve Range of Motion to 50% of stated goals, listed in objective measures above, in order to progress towards independence with functional activities.  [] Progressing  [x] Met  [] Not Met   06/30/2023   STRENGTH: Patient will improve strength to 50% of stated goals, listed in objective measures above, in order to progress towards independence with functional activities.  [] Progressing  [x] Met  [] Not Met   06/30/2023   HEP: Patient will demonstrate independence with HEP in order to progress toward functional independence. [] Progressing  [x] Met  [] Not Met  06/30/2023       Long Term Goals:  12 weeks Status Date Met   PAIN: Patient will report worst pain of 3/10 in order to progress toward max functional ability and improve quality of life [x] Progressing  [] Met  [] Not Met     FUNCTION: Patient will demonstrate improved function as indicated by a functional limitation score of less than or equal to 29 out of 100 on FOTO. [x] Progressing  [] Met  [] Not Met     MOBILITY: Patient will improve Range of Motion to stated goals, listed in objective measures above, in order to return to maximal functional potential and improve quality of life. [x] Progressing  [] Met  [] Not Met     STRENGTH: Patient will improve strength to stated goals, listed in objective measures above, in order to improve functional independence and quality of life. [x] Progressing  [] Met  [] Not Met     GAIT: Patient will demonstrate normalized gait mechanics with minimal compensation in order to return to prior level of function. [x] Progressing  [] Met  [] Not Met      Patient will return to normal ADL's, IADL's, community involvement, recreational activities, and work-related activities with less than or equal to 3/10 pain and maximal function.  [x] Progressing  [] Met  [] Not Met          PLAN     Continue Plan of Care (POC) and progress per patient tolerance. See treatment section for details on planned progressions next session.  Plan of Care Due: 09/22/2023    Lena Downing, PT

## 2023-07-07 ENCOUNTER — CLINICAL SUPPORT (OUTPATIENT)
Dept: REHABILITATION | Facility: HOSPITAL | Age: 48
End: 2023-07-07
Payer: COMMERCIAL

## 2023-07-07 DIAGNOSIS — R29.898 DECREASED STRENGTH OF LOWER EXTREMITY: ICD-10-CM

## 2023-07-07 DIAGNOSIS — M25.661 DECREASED RANGE OF MOTION (ROM) OF RIGHT KNEE: Primary | ICD-10-CM

## 2023-07-07 DIAGNOSIS — Z74.09 DECREASED FUNCTIONAL MOBILITY AND ENDURANCE: ICD-10-CM

## 2023-07-07 PROCEDURE — 97110 THERAPEUTIC EXERCISES: CPT

## 2023-07-07 PROCEDURE — 97530 THERAPEUTIC ACTIVITIES: CPT

## 2023-07-07 PROCEDURE — 97112 NEUROMUSCULAR REEDUCATION: CPT

## 2023-07-07 NOTE — PROGRESS NOTES
[facility]   Physical Therapy Treatment Note     Name: Nisha Mancuso  Clinic Number: [id number]    Therapy Diagnosis:   Encounter Diagnoses   Name Primary?    Decreased range of motion (ROM) of right knee Yes    Decreased strength of lower extremity     Decreased functional mobility and endurance      Physician: Nickolas Wolf MD    Visit Date: 7/7/2023    Physician Orders: Physical Therapy Evaluation and Treat  Medical Diagnosis from Referral:   M25.561 (ICD-10-CM) - Right medial knee pain   M23.203 (ICD-10-CM) - Old tear of medial meniscus of right knee, unspecified tear type   Evaluation Date: 5/30/2023  Authorization Period Expiration: 12/31/2023  Plan of Care Expiration: 09/22/2023  Progress Note Due: 07/30/2023  Visit # / Visits authorized: 8/20 (+1 Evaluation)  FOTO: 2/3      Precautions: Multiple Sclerosis     PTA Visit #: 0/5     Time In: 1:20 PM  Time Out: 2:00 PM  Total Billable Time: 40 minutes    SUBJECTIVE     Patient reports: she is having some more pain today in her right knee on the inside. Patient states she has not done anything differently recently.     She was compliant with home exercise program.    Response to previous treatment: good    Functional change: pain with squatting, pain with full right knee extension, ascending and descending stairs more often     Pain: 5/10  Location: right knee medial joint line    OBJECTIVE     Objective Measures updated at progress report unless specified.     Treatment     Nisha received the treatments listed below:      THERAPEUTIC EXERCISES to develop strength, endurance, ROM, flexibility, posture, and core stabilization for (12) minutes including:     Intervention Performed Today     Treadmill   8 minutes 1.2 mph   Upright Bike x 5 minutes, Level 2   Standing Calf Raises x 3x10 bilateral at stairs   Tailgaters   3 minutes 3 pound bilateral    Prone Quadriceps Stretch x  2 minutes hold bilateral   Objectives Re-assessed     15  minutes                              Plan for Next Visit: progress as tolerated      NEUROMUSCULAR RE-EDUCATION activities to improve: Coordination, Kinesthetic, Sense, and Proprioception for (18) minutes. The following activities were included: x = exercises performed     Neuromuscular Re-Education 7/7/2023    Clamshells x 3x10 bilateral red band 1-2  hold at end range of motion    Bridges x 3x10 red band   LONG ARC QUADs x 3x10 bilateral 5lbs   Prone Hamstring Curls  3x10 bilateral red band bilateral    Straight Leg Raises x 3x10 bilateral 1lbs (no weight today)   Bosu Squats  3x10 with upper extremity support at parallel bars    BOLD= new this visit  Plan for Next Visit: progress as tolerated      THERAPEUTIC ACTIVITIES: to improve functional performance through dynamic activities, for (10)  minutes including:   x = performed today    Therapeutic Activities 7/7/2023    Shuttle Squat x 6 bands, 3x10, extra rest breaks   Standing TKE's  3x10 red band bilateral   [place] Squats  3x10 green band   Prone Hip Extensions x 3x10 bilateral no resistance   Sit to Stands x 3x10    BOLD = new this visit  Plan for Next Visit: hip abduction, hip series     MANUAL THERAPY TECHNIQUES: Joint mobilizations, Soft tissue Mobilization, and mobilization with movement were applied to the area listed below for (0) minutes, including: x = exercises performed     Manual Intervention 7/7/2023    Soft Tissue Mobilization  Right MEDIAL COLLATERAL LIGAMENT and surrounding tissue   Joint Mobilizations     Mobilization with movement     Functional Dry Needling      BOLD = new this visit  Plan for Next Visit: as needed        Patient Education and Home Exercises     Home Exercises Provided and Patient Education Provided     Education provided:   PURPOSE: Patient educated on the impairments noted above and the effects of physical therapy intervention to improve overall condition and QOL.   EXERCISE: Patient was educated on all the above exercise  prior/during/after for proper posture, positioning, and execution for safe performance with home exercise program.   STRENGTH: Patient educated on the importance of improved core and extremity strength in order to improve alignment of the spine and extremities with static positions and dynamic movement.   ASSISTIVE DEV ICE: Patient educated on proper utilization of assistive device for safe and efficient ambulation and to reduce risk of falls  6/16/2023: added home exercise program     Written Home Exercises Provided: Patient instructed to cont prior HEP. Exercises were reviewed and Nisha was able to demonstrate them prior to the end of the session.  Nisha demonstrated good  understanding of the education provided. See EMR under Patient Instructions for exercises provided during therapy sessions    ASSESSMENT   No exercises today were progressed with resistance due to patient's complaints of medial knee pain. Did introduce sit to stands today for functional strengthening and patient tolerated this new exercise well with no complaints of discomfort. Plan to continue progressing as tolerated.    Nisha Is progressing well towards her goals.   Patient prognosis is Good.     Patient will continue to benefit from skilled outpatient physical therapy to address the deficits listed in the problem list box on initial evaluation, provide patient/family education and to maximize patient's level of independence in the home and community environment.     Patient's spiritual, cultural and educational needs considered and patient agreeable to plan of care and goals.     Anticipated barriers to physical therapy: co-morbidities, sedentary lifestyle, chronicity of condition, and transportation    Goals:   Reviewed:7/7/2023       Short Term Goals:  6 weeks Status  Date Met   PAIN: Patient will report worst pain of 5/10 in order to progress toward max functional ability and improve quality of life. [] Progressing  [x] Met  [] Not Met   06/30/2023   FUNCTION: Patient will demonstrate improved function as indicated by a functional limitation score of less than or equal to 36 out of 100 on FOTO. [] Progressing  [x] Met  [] Not Met  06/30/2023    MOBILITY: Patient will improve Range of Motion to 50% of stated goals, listed in objective measures above, in order to progress towards independence with functional activities.  [] Progressing  [x] Met  [] Not Met   06/30/2023   STRENGTH: Patient will improve strength to 50% of stated goals, listed in objective measures above, in order to progress towards independence with functional activities.  [] Progressing  [x] Met  [] Not Met   06/30/2023   HEP: Patient will demonstrate independence with HEP in order to progress toward functional independence. [] Progressing  [x] Met  [] Not Met  06/30/2023       Long Term Goals:  12 weeks Status Date Met   PAIN: Patient will report worst pain of 3/10 in order to progress toward max functional ability and improve quality of life [x] Progressing  [] Met  [] Not Met     FUNCTION: Patient will demonstrate improved function as indicated by a functional limitation score of less than or equal to 29 out of 100 on FOTO. [x] Progressing  [] Met  [] Not Met     MOBILITY: Patient will improve Range of Motion to stated goals, listed in objective measures above, in order to return to maximal functional potential and improve quality of life. [x] Progressing  [] Met  [] Not Met     STRENGTH: Patient will improve strength to stated goals, listed in objective measures above, in order to improve functional independence and quality of life. [x] Progressing  [] Met  [] Not Met     GAIT: Patient will demonstrate normalized gait mechanics with minimal compensation in order to return to prior level of function. [x] Progressing  [] Met  [] Not Met     Patient will return to normal ADL's, IADL's, community involvement, recreational activities, and work-related activities with less than or  equal to 3/10 pain and maximal function.  [x] Progressing  [] Met  [] Not Met          PLAN     Continue Plan of Care (POC) and progress per patient tolerance. See treatment section for details on planned progressions next session.  Plan of Care Due: 09/22/2023    Lena Downing, PT

## 2023-07-11 ENCOUNTER — HOSPITAL ENCOUNTER (OUTPATIENT)
Dept: RADIOLOGY | Facility: HOSPITAL | Age: 48
Discharge: HOME | End: 2023-07-11
Attending: INTERNAL MEDICINE
Payer: COMMERCIAL

## 2023-07-11 DIAGNOSIS — Z12.31 ENCOUNTER FOR SCREENING MAMMOGRAM FOR BREAST CANCER: ICD-10-CM

## 2023-07-11 PROCEDURE — 77067 SCR MAMMO BI INCL CAD: CPT | Mod: 26,,, | Performed by: RADIOLOGY

## 2023-07-11 PROCEDURE — 77063 MAMMO DIGITAL SCREENING BILAT WITH TOMO: ICD-10-PCS | Mod: 26,,, | Performed by: RADIOLOGY

## 2023-07-11 PROCEDURE — 77067 MAMMO DIGITAL SCREENING BILAT WITH TOMO: ICD-10-PCS | Mod: 26,,, | Performed by: RADIOLOGY

## 2023-07-11 PROCEDURE — 77063 BREAST TOMOSYNTHESIS BI: CPT | Mod: 26,,, | Performed by: RADIOLOGY

## 2023-07-11 PROCEDURE — 77067 SCR MAMMO BI INCL CAD: CPT | Mod: TC

## 2023-07-12 ENCOUNTER — CLINICAL SUPPORT (OUTPATIENT)
Dept: REHABILITATION | Facility: HOSPITAL | Age: 48
End: 2023-07-12
Payer: COMMERCIAL

## 2023-07-12 DIAGNOSIS — R29.898 DECREASED STRENGTH OF LOWER EXTREMITY: ICD-10-CM

## 2023-07-12 DIAGNOSIS — M25.661 DECREASED RANGE OF MOTION (ROM) OF RIGHT KNEE: Primary | ICD-10-CM

## 2023-07-12 DIAGNOSIS — Z74.09 DECREASED FUNCTIONAL MOBILITY AND ENDURANCE: ICD-10-CM

## 2023-07-12 PROCEDURE — 97110 THERAPEUTIC EXERCISES: CPT

## 2023-07-12 PROCEDURE — 97530 THERAPEUTIC ACTIVITIES: CPT

## 2023-07-12 PROCEDURE — 97112 NEUROMUSCULAR REEDUCATION: CPT

## 2023-07-12 NOTE — PROGRESS NOTES
[facility]   Physical Therapy Treatment Note     Name: Nisha Mancuso  Clinic Number: [id number]    Therapy Diagnosis:   Encounter Diagnoses   Name Primary?    Decreased range of motion (ROM) of right knee Yes    Decreased strength of lower extremity     Decreased functional mobility and endurance      Physician: Nickolas Wolf MD    Visit Date: 7/12/2023    Physician Orders: Physical Therapy Evaluation and Treat  Medical Diagnosis from Referral:   M25.561 (ICD-10-CM) - Right medial knee pain   M23.203 (ICD-10-CM) - Old tear of medial meniscus of right knee, unspecified tear type   Evaluation Date: 5/30/2023  Authorization Period Expiration: 12/31/2023  Plan of Care Expiration: 09/22/2023  Progress Note Due: 07/30/2023  Visit # / Visits authorized: 9/20 (+1 Evaluation)  FOTO: 2/3      Precautions: Multiple Sclerosis     PTA Visit #: 0/5     Time In: 7:15 AM  Time Out: 7:45 AM  Total Billable Time: 30 minutes    SUBJECTIVE     Patient reports: she accidentally was running a little late today. Patient states sometimes she gets bursts of pain but state that they go away quickly.     She was compliant with home exercise program.    Response to previous treatment: good    Functional change: pain with squatting, pain with full right knee extension, ascending and descending stairs more often     Pain: 5/10  Location: right knee medial joint line    OBJECTIVE     Objective Measures updated at progress report unless specified.     Treatment     Nisha received the treatments listed below:      THERAPEUTIC EXERCISES to develop strength, endurance, ROM, flexibility, posture, and core stabilization for (8) minutes including:     Intervention Performed Today     Treadmill   8 minutes 1.2 mph   Upright Bike x 6 minutes, Level 2   Standing Calf Raises  3x10 bilateral at stairs   Tailgaters   3 minutes 3 pound bilateral    Standing Calf Stretch x  2 minutes    Prone Quadriceps Stretch   2 minutes hold  bilateral   Objectives Re-assessed     15 minutes                              Plan for Next Visit: progress as tolerated      NEUROMUSCULAR RE-EDUCATION activities to improve: Coordination, Kinesthetic, Sense, and Proprioception for (14) minutes. The following activities were included: x = exercises performed     Neuromuscular Re-Education 7/12/2023    Clamshells  3x10 bilateral red band 1-2  hold at end range of motion    Bridges  3x10 red band   LONG ARC QUADs  3x10 bilateral 5lbs   Prone Hamstring Curls  3x10 bilateral red band bilateral    Straight Leg Raises  3x10 bilateral 1lbs (no weight today)   Bosu Squats  3x10 with upper extremity support at parallel bars   Tandem Walking on Foam x 10 laps within parallel bars   Heel Taps x 2x10 bilateral 4 inch box   Step Ups x X20 right LOWER EXTREMITY     BOLD= new this visit  Plan for Next Visit: progress as tolerated      THERAPEUTIC ACTIVITIES: to improve functional performance through dynamic activities, for (8)  minutes including:   x = performed today    Therapeutic Activities 7/12/2023    Shuttle Squat x 6 bands, 3x10, extra rest breaks   Standing TKE's  3x10 red band bilateral   Danish Squats  3x10 green band   Prone Hip Extensions  3x10 bilateral no resistance   Sit to Stands x 3x10 with 10lbs kettlle mann    BOLD = new this visit  Plan for Next Visit: hip abduction, hip series     MANUAL THERAPY TECHNIQUES: Joint mobilizations, Soft tissue Mobilization, and mobilization with movement were applied to the area listed below for (0) minutes, including: x = exercises performed     Manual Intervention 7/12/2023    Soft Tissue Mobilization  Right MEDIAL COLLATERAL LIGAMENT and surrounding tissue   Joint Mobilizations     Mobilization with movement     Functional Dry Needling      BOLD = new this visit  Plan for Next Visit: as needed        Patient Education and Home Exercises     Home Exercises Provided and Patient Education Provided     Education provided:    PURPOSE: Patient educated on the impairments noted above and the effects of physical therapy intervention to improve overall condition and QOL.   EXERCISE: Patient was educated on all the above exercise prior/during/after for proper posture, positioning, and execution for safe performance with home exercise program.   STRENGTH: Patient educated on the importance of improved core and extremity strength in order to improve alignment of the spine and extremities with static positions and dynamic movement.   ASSISTIVE DEV ICE: Patient educated on proper utilization of assistive device for safe and efficient ambulation and to reduce risk of falls  6/16/2023: added home exercise program     Written Home Exercises Provided: Patient instructed to cont prior HEP. Exercises were reviewed and Nisha was able to demonstrate them prior to the end of the session.  Nisha demonstrated good  understanding of the education provided. See EMR under Patient Instructions for exercises provided during therapy sessions    ASSESSMENT   Added more functional strengthening exercises and patient tolerated these well. Audible crepitus noted with high step ups, but no pain or discomfort. Able to increase weight with sit to stands with good tolerance. Plan to continue progressing as tolerated as time allows. No adverse effects noted.    Nisha Is progressing well towards her goals.   Patient prognosis is Good.     Patient will continue to benefit from skilled outpatient physical therapy to address the deficits listed in the problem list box on initial evaluation, provide patient/family education and to maximize patient's level of independence in the home and community environment.     Patient's spiritual, cultural and educational needs considered and patient agreeable to plan of care and goals.     Anticipated barriers to physical therapy: co-morbidities, sedentary lifestyle, chronicity of condition, and transportation    Goals:    Reviewed:7/12/2023       Short Term Goals:  6 weeks Status  Date Met   PAIN: Patient will report worst pain of 5/10 in order to progress toward max functional ability and improve quality of life. [] Progressing  [x] Met  [] Not Met  06/30/2023   FUNCTION: Patient will demonstrate improved function as indicated by a functional limitation score of less than or equal to 36 out of 100 on FOTO. [] Progressing  [x] Met  [] Not Met  06/30/2023    MOBILITY: Patient will improve Range of Motion to 50% of stated goals, listed in objective measures above, in order to progress towards independence with functional activities.  [] Progressing  [x] Met  [] Not Met   06/30/2023   STRENGTH: Patient will improve strength to 50% of stated goals, listed in objective measures above, in order to progress towards independence with functional activities.  [] Progressing  [x] Met  [] Not Met   06/30/2023   HEP: Patient will demonstrate independence with HEP in order to progress toward functional independence. [] Progressing  [x] Met  [] Not Met  06/30/2023       Long Term Goals:  12 weeks Status Date Met   PAIN: Patient will report worst pain of 3/10 in order to progress toward max functional ability and improve quality of life [x] Progressing  [] Met  [] Not Met     FUNCTION: Patient will demonstrate improved function as indicated by a functional limitation score of less than or equal to 29 out of 100 on FOTO. [x] Progressing  [] Met  [] Not Met     MOBILITY: Patient will improve Range of Motion to stated goals, listed in objective measures above, in order to return to maximal functional potential and improve quality of life. [x] Progressing  [] Met  [] Not Met     STRENGTH: Patient will improve strength to stated goals, listed in objective measures above, in order to improve functional independence and quality of life. [x] Progressing  [] Met  [] Not Met     GAIT: Patient will demonstrate normalized gait mechanics with minimal  compensation in order to return to prior level of function. [x] Progressing  [] Met  [] Not Met     Patient will return to normal ADL's, IADL's, community involvement, recreational activities, and work-related activities with less than or equal to 3/10 pain and maximal function.  [x] Progressing  [] Met  [] Not Met          PLAN     Continue Plan of Care (POC) and progress per patient tolerance. See treatment section for details on planned progressions next session.  Plan of Care Due: 09/22/2023    Lena Downing, PT

## 2023-07-19 ENCOUNTER — CLINICAL SUPPORT (OUTPATIENT)
Dept: REHABILITATION | Facility: HOSPITAL | Age: 48
End: 2023-07-19
Payer: COMMERCIAL

## 2023-07-19 DIAGNOSIS — M25.661 DECREASED RANGE OF MOTION (ROM) OF RIGHT KNEE: Primary | ICD-10-CM

## 2023-07-19 DIAGNOSIS — Z74.09 DECREASED FUNCTIONAL MOBILITY AND ENDURANCE: ICD-10-CM

## 2023-07-19 DIAGNOSIS — R29.898 DECREASED STRENGTH OF LOWER EXTREMITY: ICD-10-CM

## 2023-07-19 PROCEDURE — 97112 NEUROMUSCULAR REEDUCATION: CPT

## 2023-07-19 PROCEDURE — 97110 THERAPEUTIC EXERCISES: CPT

## 2023-07-19 PROCEDURE — 97530 THERAPEUTIC ACTIVITIES: CPT

## 2023-07-19 NOTE — PROGRESS NOTES
[facility]   Physical Therapy Treatment Note     Name: Nisha Mancuso  Clinic Number: [id number]    Therapy Diagnosis:   Encounter Diagnoses   Name Primary?    Decreased range of motion (ROM) of right knee Yes    Decreased strength of lower extremity     Decreased functional mobility and endurance      Physician: Nickolas Wolf MD    Visit Date: 7/19/2023    Physician Orders: Physical Therapy Evaluation and Treat  Medical Diagnosis from Referral:   M25.561 (ICD-10-CM) - Right medial knee pain   M23.203 (ICD-10-CM) - Old tear of medial meniscus of right knee, unspecified tear type   Evaluation Date: 5/30/2023  Authorization Period Expiration: 12/31/2023  Plan of Care Expiration: 09/22/2023  Progress Note Due: 07/30/2023  Visit # / Visits authorized: 10/20 (+1 Evaluation)  FOTO: 2/3      Precautions: Multiple Sclerosis     PTA Visit #: 0/5     Time In: 7:05 AM  Time Out: 7:45 AM  Total Billable Time: 40 minutes    SUBJECTIVE     Patient reports: she is still having those sharp pains at rest. Patient reports they are not intense, but more annoying than anything. Patient states she has some cracking sounds in her right knee but that they are not painful.    She was compliant with home exercise program.    Response to previous treatment: good    Functional change: pain with squatting, pain with full right knee extension, ascending and descending stairs more often     Pain: 3/10  Location: right knee medial joint line    OBJECTIVE     Objective Measures updated at progress report unless specified.     Treatment     Nisha received the treatments listed below:      THERAPEUTIC EXERCISES to develop strength, endurance, ROM, flexibility, posture, and core stabilization for (10) minutes including:     Intervention Performed Today     Treadmill   8 minutes 1.2 mph   Upright Bike x 6 minutes, Level 2   Standing Calf Raises  3x10 bilateral at stairs   Tailgaters   3 minutes 3 pound bilateral     Standing Calf Stretch   2 minutes    Prone Quadriceps Stretch x  2 minutes hold bilateral   Objectives Re-assessed     15 minutes                              Plan for Next Visit: progress as tolerated      NEUROMUSCULAR RE-EDUCATION activities to improve: Coordination, Kinesthetic, Sense, and Proprioception for (16) minutes. The following activities were included: x = exercises performed     Neuromuscular Re-Education 7/19/2023    Clamshells x 3x10 bilateral red band 1-2  hold at end range of motion    Bridges  3x10 red band   LONG ARC QUADs  3x10 bilateral 5lbs   Prone Hamstring Curls  3x10 bilateral red band bilateral    Straight Leg Raises  3x10 bilateral 1lbs (no weight today)   Bosu Squats  3x10 with upper extremity support at parallel bars   Tandem Walking on Foam x 10 laps within parallel bars   Heel Taps x 3x10 bilateral 4 inch box   Step Ups x X30 right LOWER EXTREMITY 6inch box    BOLD= new this visit  Plan for Next Visit: progress as tolerated      THERAPEUTIC ACTIVITIES: to improve functional performance through dynamic activities, for (14)  minutes including:   x = performed today    Therapeutic Activities 7/19/2023    Shuttle Squat x 7 bands 3 minutes   Foam Roll Squats x 3x10 no resistance   Standing TKE's  3x10 red band bilateral   Maltese Squats  3x10 green band   Prone Hip Extensions  3x10 bilateral no resistance   Sit to Stands x 3x10 with 10lbs kettlle mann    BOLD = new this visit  Plan for Next Visit: hip abduction, hip series     MANUAL THERAPY TECHNIQUES: Joint mobilizations, Soft tissue Mobilization, and mobilization with movement were applied to the area listed below for (0) minutes, including: x = exercises performed     Manual Intervention 7/19/2023    Soft Tissue Mobilization  Right MEDIAL COLLATERAL LIGAMENT and surrounding tissue   Joint Mobilizations     Mobilization with movement     Functional Dry Needling      BOLD = new this visit  Plan for Next Visit: as needed        Patient  Education and Home Exercises     Home Exercises Provided and Patient Education Provided     Education provided:   PURPOSE: Patient educated on the impairments noted above and the effects of physical therapy intervention to improve overall condition and QOL.   EXERCISE: Patient was educated on all the above exercise prior/during/after for proper posture, positioning, and execution for safe performance with home exercise program.   STRENGTH: Patient educated on the importance of improved core and extremity strength in order to improve alignment of the spine and extremities with static positions and dynamic movement.   ASSISTIVE DEV ICE: Patient educated on proper utilization of assistive device for safe and efficient ambulation and to reduce risk of falls  6/16/2023: added home exercise program     Written Home Exercises Provided: Patient instructed to cont prior HEP. Exercises were reviewed and Nisha was able to demonstrate them prior to the end of the session.  Nisha demonstrated good  understanding of the education provided. See EMR under Patient Instructions for exercises provided during therapy sessions    ASSESSMENT   Increased resistance on quadriceps strengthening exercises and also added foam roll squats. Patient did require verbal cues for form and to equalize weight on both lower extremities instead of favoring the left. Patient did better with these cues. Plan to continue progressing as tolerated; no adverse effects noted.    Nisha Is progressing well towards her goals.   Patient prognosis is Good.     Patient will continue to benefit from skilled outpatient physical therapy to address the deficits listed in the problem list box on initial evaluation, provide patient/family education and to maximize patient's level of independence in the home and community environment.     Patient's spiritual, cultural and educational needs considered and patient agreeable to plan of care and goals.     Anticipated  barriers to physical therapy: co-morbidities, sedentary lifestyle, chronicity of condition, and transportation    Goals:   Reviewed:7/19/2023       Short Term Goals:  6 weeks Status  Date Met   PAIN: Patient will report worst pain of 5/10 in order to progress toward max functional ability and improve quality of life. [] Progressing  [x] Met  [] Not Met  06/30/2023   FUNCTION: Patient will demonstrate improved function as indicated by a functional limitation score of less than or equal to 36 out of 100 on FOTO. [] Progressing  [x] Met  [] Not Met  06/30/2023    MOBILITY: Patient will improve Range of Motion to 50% of stated goals, listed in objective measures above, in order to progress towards independence with functional activities.  [] Progressing  [x] Met  [] Not Met   06/30/2023   STRENGTH: Patient will improve strength to 50% of stated goals, listed in objective measures above, in order to progress towards independence with functional activities.  [] Progressing  [x] Met  [] Not Met   06/30/2023   HEP: Patient will demonstrate independence with HEP in order to progress toward functional independence. [] Progressing  [x] Met  [] Not Met  06/30/2023       Long Term Goals:  12 weeks Status Date Met   PAIN: Patient will report worst pain of 3/10 in order to progress toward max functional ability and improve quality of life [x] Progressing  [] Met  [] Not Met     FUNCTION: Patient will demonstrate improved function as indicated by a functional limitation score of less than or equal to 29 out of 100 on FOTO. [x] Progressing  [] Met  [] Not Met     MOBILITY: Patient will improve Range of Motion to stated goals, listed in objective measures above, in order to return to maximal functional potential and improve quality of life. [x] Progressing  [] Met  [] Not Met     STRENGTH: Patient will improve strength to stated goals, listed in objective measures above, in order to improve functional independence and quality of  life. [x] Progressing  [] Met  [] Not Met     GAIT: Patient will demonstrate normalized gait mechanics with minimal compensation in order to return to prior level of function. [x] Progressing  [] Met  [] Not Met     Patient will return to normal ADL's, IADL's, community involvement, recreational activities, and work-related activities with less than or equal to 3/10 pain and maximal function.  [x] Progressing  [] Met  [] Not Met          PLAN     Continue Plan of Care (POC) and progress per patient tolerance. See treatment section for details on planned progressions next session.  Plan of Care Due: 09/22/2023    Lena Downing, PT

## 2023-07-21 ENCOUNTER — PATIENT MESSAGE (OUTPATIENT)
Dept: PSYCHIATRY | Facility: CLINIC | Age: 48
End: 2023-07-21
Payer: COMMERCIAL

## 2023-07-22 ENCOUNTER — PATIENT MESSAGE (OUTPATIENT)
Dept: SPORTS MEDICINE | Facility: CLINIC | Age: 48
End: 2023-07-22
Payer: COMMERCIAL

## 2023-07-26 ENCOUNTER — CLINICAL SUPPORT (OUTPATIENT)
Dept: REHABILITATION | Facility: HOSPITAL | Age: 48
End: 2023-07-26
Payer: COMMERCIAL

## 2023-07-26 DIAGNOSIS — M25.661 DECREASED RANGE OF MOTION (ROM) OF RIGHT KNEE: Primary | ICD-10-CM

## 2023-07-26 DIAGNOSIS — Z74.09 DECREASED FUNCTIONAL MOBILITY AND ENDURANCE: ICD-10-CM

## 2023-07-26 DIAGNOSIS — R29.898 DECREASED STRENGTH OF LOWER EXTREMITY: ICD-10-CM

## 2023-07-26 PROCEDURE — 97112 NEUROMUSCULAR REEDUCATION: CPT

## 2023-07-26 PROCEDURE — 97110 THERAPEUTIC EXERCISES: CPT

## 2023-07-26 PROCEDURE — 97530 THERAPEUTIC ACTIVITIES: CPT

## 2023-07-26 NOTE — PROGRESS NOTES
"[facility]   Physical Therapy Treatment Note     Name: Nisha Mancuso  Clinic Number: [id number]    Therapy Diagnosis:   Encounter Diagnoses   Name Primary?    Decreased range of motion (ROM) of right knee Yes    Decreased strength of lower extremity     Decreased functional mobility and endurance      Physician: Nickolas Wolf MD    Visit Date: 7/26/2023    Physician Orders: Physical Therapy Evaluation and Treat  Medical Diagnosis from Referral:   M25.561 (ICD-10-CM) - Right medial knee pain   M23.203 (ICD-10-CM) - Old tear of medial meniscus of right knee, unspecified tear type   Evaluation Date: 5/30/2023  Authorization Period Expiration: 12/31/2023  Plan of Care Expiration: 09/22/2023  Progress Note Due: 07/30/2023  Visit # / Visits authorized: 11/20 (+1 Evaluation)  FOTO: 2/3      Precautions: Multiple Sclerosis     PTA Visit #: 0/5     Time In: 7:05 AM  Time Out: 7:45 AM  Total Billable Time: 40 minutes    SUBJECTIVE     Patient reports: she feels like her pain has moved slightly to the front of her knee. Patient states the pain is "not bad" but it is annoying.    She was compliant with home exercise program.    Response to previous treatment: good    Functional change: pain with squatting, pain with full right knee extension, ascending and descending stairs more often     Pain: 3/10  Location: right knee medial joint line    OBJECTIVE     Objective Measures updated at progress report unless specified.     Treatment     Nisha received the treatments listed below:      THERAPEUTIC EXERCISES to develop strength, endurance, ROM, flexibility, posture, and core stabilization for (10) minutes including:     Intervention Performed Today     Treadmill   8 minutes 1.2 mph   Upright Bike x 6 minutes, Level 2   Standing Calf Raises  3x10 bilateral at stairs   Tailgaters   3 minutes 3 pound bilateral    Standing Calf Stretch   2 minutes    Prone Quadriceps Stretch x  2 minutes hold bilateral "   Objectives Re-assessed     15 minutes                              Plan for Next Visit: progress as tolerated      NEUROMUSCULAR RE-EDUCATION activities to improve: Coordination, Kinesthetic, Sense, and Proprioception for (16) minutes. The following activities were included: x = exercises performed     Neuromuscular Re-Education 7/26/2023    Clamshells x 3x10 bilateral red band 1-2  hold at end range of motion    Sidelying hip abduction  x 3 x 10   Bridges x 3x10    LONG ARC QUADs  3x10 bilateral 5lbs   Prone Hamstring Curls  3x10 bilateral red band bilateral    Straight Leg Raises x 3 x10 bilateral    Bosu Squats  3x10 with upper extremity support at parallel bars   Tandem Walking on Foam  10 laps within parallel bars   Heel Taps  3x10 bilateral 4 inch box   Step Ups  X30 right LOWER EXTREMITY 6inch box    BOLD= new this visit  Plan for Next Visit: progress as tolerated      THERAPEUTIC ACTIVITIES: to improve functional performance through dynamic activities, for (14)  minutes including:   x = performed today    Therapeutic Activities 7/26/2023    Shuttle Squat x 4 bands 3 minutes with red band for hip abduction    Foam Roll Squats  3x10 no resistance   Standing TKE's  3x10 red band bilateral   Macedonian Squats  3x10 green band   Prone Hip Extensions  3x10 bilateral no resistance   Sit to Stands x 3x10 with 10lbs kettlle bell and green band   Squats with chair x 3 x 10     BOLD = new this visit  Plan for Next Visit: hip abduction, hip series     MANUAL THERAPY TECHNIQUES: Joint mobilizations, Soft tissue Mobilization, and mobilization with movement were applied to the area listed below for (0) minutes, including: x = exercises performed     Manual Intervention 7/26/2023    Soft Tissue Mobilization  Right MEDIAL COLLATERAL LIGAMENT and surrounding tissue   Joint Mobilizations     Mobilization with movement     Functional Dry Needling      BOLD = new this visit  Plan for Next Visit: as needed        Patient Education  and Home Exercises     Home Exercises Provided and Patient Education Provided     Education provided:   PURPOSE: Patient educated on the impairments noted above and the effects of physical therapy intervention to improve overall condition and QOL.   EXERCISE: Patient was educated on all the above exercise prior/during/after for proper posture, positioning, and execution for safe performance with home exercise program.   STRENGTH: Patient educated on the importance of improved core and extremity strength in order to improve alignment of the spine and extremities with static positions and dynamic movement.   ASSISTIVE DEV ICE: Patient educated on proper utilization of assistive device for safe and efficient ambulation and to reduce risk of falls  6/16/2023: added home exercise program     Written Home Exercises Provided: Patient instructed to cont prior HEP. Exercises were reviewed and Nisha was able to demonstrate them prior to the end of the session.  Nisha demonstrated good  understanding of the education provided. See EMR under Patient Instructions for exercises provided during therapy sessions    ASSESSMENT   Due to patient's anterior knee pain more exercises were geared towards engaging lateral glutes as well as proper squat form. Patient did struggle to keep her weight posterior so squats to chair were added and patient maintained better form with this. Patient educated to practice this at home as well. Plan to continue progressing as tolerated; no adverse effects noted.    Nisha Is progressing well towards her goals.   Patient prognosis is Good.     Patient will continue to benefit from skilled outpatient physical therapy to address the deficits listed in the problem list box on initial evaluation, provide patient/family education and to maximize patient's level of independence in the home and community environment.     Patient's spiritual, cultural and educational needs considered and patient agreeable to  plan of care and goals.     Anticipated barriers to physical therapy: co-morbidities, sedentary lifestyle, chronicity of condition, and transportation    Goals:   Reviewed:7/26/2023       Short Term Goals:  6 weeks Status  Date Met   PAIN: Patient will report worst pain of 5/10 in order to progress toward max functional ability and improve quality of life. [] Progressing  [x] Met  [] Not Met  06/30/2023   FUNCTION: Patient will demonstrate improved function as indicated by a functional limitation score of less than or equal to 36 out of 100 on FOTO. [] Progressing  [x] Met  [] Not Met  06/30/2023    MOBILITY: Patient will improve Range of Motion to 50% of stated goals, listed in objective measures above, in order to progress towards independence with functional activities.  [] Progressing  [x] Met  [] Not Met   06/30/2023   STRENGTH: Patient will improve strength to 50% of stated goals, listed in objective measures above, in order to progress towards independence with functional activities.  [] Progressing  [x] Met  [] Not Met   06/30/2023   HEP: Patient will demonstrate independence with HEP in order to progress toward functional independence. [] Progressing  [x] Met  [] Not Met  06/30/2023       Long Term Goals:  12 weeks Status Date Met   PAIN: Patient will report worst pain of 3/10 in order to progress toward max functional ability and improve quality of life [x] Progressing  [] Met  [] Not Met     FUNCTION: Patient will demonstrate improved function as indicated by a functional limitation score of less than or equal to 29 out of 100 on FOTO. [x] Progressing  [] Met  [] Not Met     MOBILITY: Patient will improve Range of Motion to stated goals, listed in objective measures above, in order to return to maximal functional potential and improve quality of life. [x] Progressing  [] Met  [] Not Met     STRENGTH: Patient will improve strength to stated goals, listed in objective measures above, in order to improve  functional independence and quality of life. [x] Progressing  [] Met  [] Not Met     GAIT: Patient will demonstrate normalized gait mechanics with minimal compensation in order to return to prior level of function. [x] Progressing  [] Met  [] Not Met     Patient will return to normal ADL's, IADL's, community involvement, recreational activities, and work-related activities with less than or equal to 3/10 pain and maximal function.  [x] Progressing  [] Met  [] Not Met          PLAN     Continue Plan of Care (POC) and progress per patient tolerance. See treatment section for details on planned progressions next session.  Plan of Care Due: 09/22/2023    Lena Downing, PT

## 2023-08-02 ENCOUNTER — TELEPHONE (OUTPATIENT)
Dept: INTERNAL MEDICINE | Facility: CLINIC | Age: 48
End: 2023-08-02
Payer: COMMERCIAL

## 2023-08-02 NOTE — TELEPHONE ENCOUNTER
----- Message from Fe Stout sent at 8/2/2023  9:49 AM CDT -----  Contact: Nisha Matos is calling in regards to getting an virtual or in person appt on 08/02 due to concerns about medications for MS. Please call back at [phone]    Thanks  SW

## 2023-08-04 ENCOUNTER — CLINICAL SUPPORT (OUTPATIENT)
Dept: REHABILITATION | Facility: HOSPITAL | Age: 48
End: 2023-08-04
Payer: COMMERCIAL

## 2023-08-04 DIAGNOSIS — R29.898 DECREASED STRENGTH OF LOWER EXTREMITY: ICD-10-CM

## 2023-08-04 DIAGNOSIS — Z74.09 DECREASED FUNCTIONAL MOBILITY AND ENDURANCE: ICD-10-CM

## 2023-08-04 DIAGNOSIS — M25.661 DECREASED RANGE OF MOTION (ROM) OF RIGHT KNEE: Primary | ICD-10-CM

## 2023-08-04 PROCEDURE — 97110 THERAPEUTIC EXERCISES: CPT | Mod: CQ

## 2023-08-04 PROCEDURE — 97530 THERAPEUTIC ACTIVITIES: CPT | Mod: CQ

## 2023-08-04 PROCEDURE — 97112 NEUROMUSCULAR REEDUCATION: CPT | Mod: CQ

## 2023-08-04 NOTE — PROGRESS NOTES
[facility]   Physical Therapy Treatment Note + Progress Report    Name: Nisha Mancuso  Clinic Number: [id number]    Therapy Diagnosis:   Encounter Diagnoses   Name Primary?    Decreased range of motion (ROM) of right knee Yes    Decreased strength of lower extremity     Decreased functional mobility and endurance        Physician: Nickolas Wolf MD    Visit Date: 8/4/2023    Physician Orders: Physical Therapy Evaluation and Treat  Medical Diagnosis from Referral:   M25.561 (ICD-10-CM) - Right medial knee pain   M23.203 (ICD-10-CM) - Old tear of medial meniscus of right knee, unspecified tear type   Evaluation Date: 5/30/2023  Authorization Period Expiration: 12/31/2023  Plan of Care Expiration: 09/22/2023  Progress Note Due: 07/30/2023  Visit # / Visits authorized: 12/20 (+1 Evaluation)  FOTO: 2/3      Precautions: Multiple Sclerosis     PTA Visit #: 1/5     Time In: 1448  Time Out: 1543  Total Billable Time: 55 minutes    SUBJECTIVE     Patient reports: after her last session, she walked about 29168 steps leaving her feeling more right medial knee joint pain.     She was compliant with home exercise program.    Response to previous treatment: good    Functional change: less pain with walking and able to walk farther. Squatting has gotten better, pain with full right knee extension, ascending and descending stairs without issues     Pain: 4/10  Location: right knee medial joint line    OBJECTIVE     Objective Measures updated at progress report unless specified.        RANGE OF MOTION:     Knee AROM/PROM Right Left Pain/Dysfunction with Movement Goal Right 8/4/2023 Left 8/4/2023   Knee Flexion (135) 125* WITHIN FUNCTIONAL LIMITS    120 bilateral 135 133   Knee Extension (0) 0* WITHIN FUNCTIONAL LIMITS    0 bilateral 0 0      Bilateral Hip and Ankle WITHIN FUNCTIONAL LIMITS      STRENGTH:     L/E MMT Right    Left Pain/Dysfunction with Movement Goal Right 8/4/2023 Left 8/4/2023   Hip  Flexion  4+/5* 4+/5*   4+/5 Bilateral 4/5 4+/5   Hip Extension  4/5* 4+/5*   4+/5 Bilateral 4/5 4/5   Hip Abduction  4/5* 4+/5*   4+/5 Bilateral 4/5 4/5   Knee Extension 4/5* 5/5*   5/5 Bilateral 5/5 5/5   Knee Flexion 5/5* 5/5*   5/5 Bilateral 4+/5 4+/5   Ankle Dorsiflexion 5/5 5/5   5/5 Bilateral 5/5 5/5   Ankle Plantarflexion 5/5 5/5   5/5 Bilateral 5/5 5/5          Treatment     Nisha received the treatments listed below:      THERAPEUTIC EXERCISES to develop strength, endurance, ROM, flexibility, posture, and core stabilization for (21) minutes including: obtaining objective measuements     Intervention Performed Today     Treadmill   8 minutes 1.2 mph   Upright Bike x 6 minutes, Level 2   Standing Calf Raises  3x10 bilateral at stairs   Tailgaters   3 minutes 3 pound bilateral    Standing Calf Stretch   2 minutes    Prone Quadriceps Stretch   2 minutes hold bilateral   Objectives Re-assessed   x  15 minutes                             Plan for Next Visit: progress as tolerated      NEUROMUSCULAR RE-EDUCATION activities to improve: Coordination, Kinesthetic, Sense, and Proprioception for (23) minutes. The following activities were included: x = exercises performed     Neuromuscular Re-Education 8/4/2023    Clamshells  3x10 bilateral red band 1-2  hold at end range of motion    Sidelying hip abduction  x 3 x 10   Bridges  3x10    LONG ARC QUADs  3x10 bilateral 5lbs   Prone Hamstring Curls  3x10 bilateral red band bilateral    Straight Leg Raises x 3 x10 bilateral    Bosu Squats  3x10 with upper extremity support at parallel bars   Tandem Walking on Foam  10 laps within parallel bars   Heel Taps  3x10 bilateral 4 inch box   Step Ups x X 30 bilateral LOWER EXTREMITY 6 inch box without upper extremity support   Heel raises x 3 x 10 1 hand support   Toe raises x 3 x 10 1 hand support    BOLD= new this visit  Plan for Next Visit: progress as tolerated      THERAPEUTIC ACTIVITIES: to improve functional performance  through dynamic activities, for (11)  minutes including:   x = performed today    Therapeutic Activities 8/4/2023    Shuttle Squat  4 bands 3 minutes with red band for hip abduction    Foam Roll Squats  3x10 no resistance   Standing TKE's x 3 x 10 pink cooks cord bilateral more weight on the lower extremity that is being exercised    Sammarinese Squats  3x10 green band   Prone Hip Extensions x 3x10 bilateral no resistance   Sit to Stands  3 x 10 with 10 pound kettle bell and green band   Squats with chair x 3 x 10 10 pound kettle bell    BOLD = new this visit  Plan for Next Visit: hip abduction, hip series     MANUAL THERAPY TECHNIQUES: Joint mobilizations, Soft tissue Mobilization, and mobilization with movement were applied to the area listed below for (0) minutes, including: x = exercises performed     Manual Intervention 8/4/2023    Soft Tissue Mobilization  Right MEDIAL COLLATERAL LIGAMENT and surrounding tissue   Joint Mobilizations     Mobilization with movement     Functional Dry Needling      BOLD = new this visit  Plan for Next Visit: as needed        Patient Education and Home Exercises     Home Exercises Provided and Patient Education Provided     Education provided:   PURPOSE: Patient educated on the impairments noted above and the effects of physical therapy intervention to improve overall condition and QOL.   EXERCISE: Patient was educated on all the above exercise prior/during/after for proper posture, positioning, and execution for safe performance with home exercise program.   STRENGTH: Patient educated on the importance of improved core and extremity strength in order to improve alignment of the spine and extremities with static positions and dynamic movement.   ASSISTIVE DEV ICE: Patient educated on proper utilization of assistive device for safe and efficient ambulation and to reduce risk of falls  6/16/2023: added home exercise program   8/4/2023: added home exercise program to my chart and gave  patient paper copy     Written Home Exercises Provided: Patient instructed to cont prior HEP. Exercises were reviewed and Nisha was able to demonstrate them prior to the end of the session.  Nisha demonstrated good  understanding of the education provided. See EMR under Patient Instructions for exercises provided during therapy sessions    ASSESSMENT   Mrs. Mancuso reports she continues to have pain which she reports getting worse after a prolonged walking while shopping. She has right knee flexion range of motion and is stronger in her right knee extension. All objective measurements are compared to 6/30/2023. Her FOTO score has improved since her initial FOTO but also has gotten worse since her last FOTO was given. She may benefit form additional physical therapy to address her remaining deficits.     Nisha Is progressing well towards her goals.   Patient prognosis is Good.     Patient will continue to benefit from skilled outpatient physical therapy to address the deficits listed in the problem list box on initial evaluation, provide patient/family education and to maximize patient's level of independence in the home and community environment.     Patient's spiritual, cultural and educational needs considered and patient agreeable to plan of care and goals.     Anticipated barriers to physical therapy: co-morbidities, sedentary lifestyle, chronicity of condition, and transportation    Goals:   Reviewed:8/4/2023       Short Term Goals:  6 weeks Status  Date Met   PAIN: Patient will report worst pain of 5/10 in order to progress toward max functional ability and improve quality of life. [] Progressing  [x] Met  [] Not Met  06/30/2023   FUNCTION: Patient will demonstrate improved function as indicated by a functional limitation score of less than or equal to 36 out of 100 on FOTO. [] Progressing  [x] Met  [] Not Met  06/30/2023    MOBILITY: Patient will improve Range of Motion to 50% of stated goals, listed in  objective measures above, in order to progress towards independence with functional activities.  [] Progressing  [x] Met  [] Not Met   06/30/2023   STRENGTH: Patient will improve strength to 50% of stated goals, listed in objective measures above, in order to progress towards independence with functional activities.  [] Progressing  [x] Met  [] Not Met   06/30/2023   HEP: Patient will demonstrate independence with HEP in order to progress toward functional independence. [] Progressing  [x] Met  [] Not Met  06/30/2023       Long Term Goals:  12 weeks Status Date Met   PAIN: Patient will report worst pain of 3/10 in order to progress toward max functional ability and improve quality of life [x] Progressing  [] Met  [] Not Met     FUNCTION: Patient will demonstrate improved function as indicated by a functional limitation score of less than or equal to 29 out of 100 on FOTO. [x] Progressing  [] Met  [] Not Met     MOBILITY: Patient will improve Range of Motion to stated goals, listed in objective measures above, in order to return to maximal functional potential and improve quality of life. [x] Progressing  [] Met  [] Not Met     STRENGTH: Patient will improve strength to stated goals, listed in objective measures above, in order to improve functional independence and quality of life. [x] Progressing  [] Met  [] Not Met     GAIT: Patient will demonstrate normalized gait mechanics with minimal compensation in order to return to prior level of function. [x] Progressing  [] Met  [] Not Met     Patient will return to normal ADL's, IADL's, community involvement, recreational activities, and work-related activities with less than or equal to 3/10 pain and maximal function.  [x] Progressing  [] Met  [] Not Met          PLAN     Continue Plan of Care (POC) and progress per patient tolerance. See treatment section for details on planned progressions next session.  Plan of Care Due: 09/22/2023    Kenny Ayala PTA

## 2023-08-08 ENCOUNTER — DOCUMENTATION ONLY (OUTPATIENT)
Dept: REHABILITATION | Facility: HOSPITAL | Age: 48
End: 2023-08-08
Payer: COMMERCIAL

## 2023-08-09 ENCOUNTER — CLINICAL SUPPORT (OUTPATIENT)
Dept: REHABILITATION | Facility: HOSPITAL | Age: 48
End: 2023-08-09
Payer: COMMERCIAL

## 2023-08-09 DIAGNOSIS — M25.661 DECREASED RANGE OF MOTION (ROM) OF RIGHT KNEE: Primary | ICD-10-CM

## 2023-08-09 DIAGNOSIS — R29.898 DECREASED STRENGTH OF LOWER EXTREMITY: ICD-10-CM

## 2023-08-09 DIAGNOSIS — Z74.09 DECREASED FUNCTIONAL MOBILITY AND ENDURANCE: ICD-10-CM

## 2023-08-09 PROCEDURE — 97112 NEUROMUSCULAR REEDUCATION: CPT

## 2023-08-09 PROCEDURE — 97110 THERAPEUTIC EXERCISES: CPT

## 2023-08-09 PROCEDURE — 97530 THERAPEUTIC ACTIVITIES: CPT

## 2023-08-09 NOTE — PROGRESS NOTES
[facility]   Physical Therapy Treatment Note     Name: Nisha Mancuso  Clinic Number: [id number]    Therapy Diagnosis:   Encounter Diagnoses   Name Primary?    Decreased range of motion (ROM) of right knee Yes    Decreased strength of lower extremity     Decreased functional mobility and endurance        Physician: Nickolas Wolf MD    Visit Date: 8/9/2023    Physician Orders: Physical Therapy Evaluation and Treat  Medical Diagnosis from Referral:   M25.561 (ICD-10-CM) - Right medial knee pain   M23.203 (ICD-10-CM) - Old tear of medial meniscus of right knee, unspecified tear type   Evaluation Date: 5/30/2023  Authorization Period Expiration: 12/31/2023  Plan of Care Expiration: 09/22/2023  Progress Note Due: 09/03/2023  Visit # / Visits authorized: 13/20 (+1 Evaluation)  FOTO: 2/3      Precautions: Multiple Sclerosis     PTA Visit #: 0/5     Time In: 4:52 PM  Time Out: 5:30 PM  Total Billable Time: 38 minutes    SUBJECTIVE     Patient reports: she is doing better today and her only complaint with her knee is that when she walks longer distances she gets pain in the inside of her joint.    She was compliant with home exercise program.    Response to previous treatment: good    Functional change: less pain with walking and able to walk farther. Squatting has gotten better, pain with full right knee extension, ascending and descending stairs without issues     Pain: 0/10  Location: right knee medial joint line    OBJECTIVE     Objective Measures updated at progress report unless specified.        Treatment     Nisha received the treatments listed below:      THERAPEUTIC EXERCISES to develop strength, endurance, ROM, flexibility, posture, and core stabilization for (8) minutes including: obtaining objective measuements     Intervention Performed Today     Treadmill   8 minutes 1.2 mph   Upright Bike x 6 minutes, Level 2   Standing Calf Raises x 2 x 10 unilateral at stairs   Tailgaters   3  minutes 3 pound bilateral    Standing Calf Stretch   2 minutes    Prone Quadriceps Stretch   2 minutes hold bilateral   Objectives Re-assessed     15 minutes                             Plan for Next Visit: progress as tolerated      NEUROMUSCULAR RE-EDUCATION activities to improve: Coordination, Kinesthetic, Sense, and Proprioception for (14) minutes. The following activities were included: x = exercises performed     Neuromuscular Re-Education 8/9/2023    Clamshells x 3x10 bilateral red band 1-2  hold at end range of motion    Sidelying hip abduction   3 x 10   Bridges  3x10    LONG ARC QUADs  3x10 bilateral 5lbs   Prone Hamstring Curls  3x10 bilateral red band bilateral    Straight Leg Raises  3 x10 bilateral    Bosu Squats  3x10 with upper extremity support at parallel bars   Tandem Walking on Foam  10 laps within parallel bars   Heel Taps  3x10 bilateral 4 inch box   Step Ups x X 30 bilateral LOWER EXTREMITY 6 inch box without upper extremity support, 10lbs for first ten reps   Heel raises  3 x 10 1 hand support   Toe raises  3 x 10 1 hand support    BOLD= new this visit  Plan for Next Visit: progress as tolerated      THERAPEUTIC ACTIVITIES: to improve functional performance through dynamic activities, for (16)  minutes including:   x = performed today    Therapeutic Activities 8/9/2023    Shuttle Squat x 6 bands 3 x 10 bilateral one extremity    Hip Circles x 2 x 10 with circles forward and backwards, fatigue noted with extra rest breaks taken   Forward Back     Foam Roll Squats  3x10 no resistance   Standing TKE's  3 x 10 pink cooks cord bilateral more weight on the lower extremity that is being exercised    [place] Squats  3x10 green band   Prone Hip Extensions  3x10 bilateral no resistance   Sit to Stands  3 x 10 with red band   Squats with chair  3 x 10 10 pound kettle bell    BOLD = new this visit  Plan for Next Visit: hip abduction, hip series     MANUAL THERAPY TECHNIQUES: Joint mobilizations, Soft  tissue Mobilization, and mobilization with movement were applied to the area listed below for (0) minutes, including: x = exercises performed     Manual Intervention 8/9/2023    Soft Tissue Mobilization  Right MEDIAL COLLATERAL LIGAMENT and surrounding tissue   Joint Mobilizations     Mobilization with movement     Functional Dry Needling      BOLD = new this visit  Plan for Next Visit: as needed        Patient Education and Home Exercises     Home Exercises Provided and Patient Education Provided     Education provided:   PURPOSE: Patient educated on the impairments noted above and the effects of physical therapy intervention to improve overall condition and QOL.   EXERCISE: Patient was educated on all the above exercise prior/during/after for proper posture, positioning, and execution for safe performance with home exercise program.   STRENGTH: Patient educated on the importance of improved core and extremity strength in order to improve alignment of the spine and extremities with static positions and dynamic movement.   ASSISTIVE DEV ICE: Patient educated on proper utilization of assistive device for safe and efficient ambulation and to reduce risk of falls  6/16/2023: added home exercise program   8/4/2023: added home exercise program to my chart and gave patient paper copy     Written Home Exercises Provided: Patient instructed to cont prior HEP. Exercises were reviewed and Nisha was able to demonstrate them prior to the end of the session.  Nisha demonstrated good  understanding of the education provided. See EMR under Patient Instructions for exercises provided during therapy sessions    ASSESSMENT   Mrs. Mancuso tolerated today's session well with no increases in pain noted. Patient with bilateral knee valgus that does affect patient's ability to perform step ups and squatting correctly. Patient may continue to benefit from lateral hip strengthening as these activities still do fatigue this musculature and  cause a lot of fatigue for patient.    Nisha Is progressing well towards her goals.   Patient prognosis is Good.     Patient will continue to benefit from skilled outpatient physical therapy to address the deficits listed in the problem list box on initial evaluation, provide patient/family education and to maximize patient's level of independence in the home and community environment.     Patient's spiritual, cultural and educational needs considered and patient agreeable to plan of care and goals.     Anticipated barriers to physical therapy: co-morbidities, sedentary lifestyle, chronicity of condition, and transportation    Goals:   Reviewed:8/9/2023       Short Term Goals:  6 weeks Status  Date Met   PAIN: Patient will report worst pain of 5/10 in order to progress toward max functional ability and improve quality of life. [] Progressing  [x] Met  [] Not Met  06/30/2023   FUNCTION: Patient will demonstrate improved function as indicated by a functional limitation score of less than or equal to 36 out of 100 on FOTO. [] Progressing  [x] Met  [] Not Met  06/30/2023    MOBILITY: Patient will improve Range of Motion to 50% of stated goals, listed in objective measures above, in order to progress towards independence with functional activities.  [] Progressing  [x] Met  [] Not Met   06/30/2023   STRENGTH: Patient will improve strength to 50% of stated goals, listed in objective measures above, in order to progress towards independence with functional activities.  [] Progressing  [x] Met  [] Not Met   06/30/2023   HEP: Patient will demonstrate independence with HEP in order to progress toward functional independence. [] Progressing  [x] Met  [] Not Met  06/30/2023       Long Term Goals:  12 weeks Status Date Met   PAIN: Patient will report worst pain of 3/10 in order to progress toward max functional ability and improve quality of life [x] Progressing  [] Met  [] Not Met     FUNCTION: Patient will demonstrate  improved function as indicated by a functional limitation score of less than or equal to 29 out of 100 on FOTO. [x] Progressing  [] Met  [] Not Met     MOBILITY: Patient will improve Range of Motion to stated goals, listed in objective measures above, in order to return to maximal functional potential and improve quality of life. [x] Progressing  [] Met  [] Not Met     STRENGTH: Patient will improve strength to stated goals, listed in objective measures above, in order to improve functional independence and quality of life. [x] Progressing  [] Met  [] Not Met     GAIT: Patient will demonstrate normalized gait mechanics with minimal compensation in order to return to prior level of function. [x] Progressing  [] Met  [] Not Met     Patient will return to normal ADL's, IADL's, community involvement, recreational activities, and work-related activities with less than or equal to 3/10 pain and maximal function.  [x] Progressing  [] Met  [] Not Met          PLAN     Continue Plan of Care (POC) and progress per patient tolerance. See treatment section for details on planned progressions next session.  Plan of Care Due: 09/22/2023    Lena Downing, PT

## 2023-09-01 ENCOUNTER — CLINICAL SUPPORT (OUTPATIENT)
Dept: REHABILITATION | Facility: HOSPITAL | Age: 48
End: 2023-09-01
Payer: COMMERCIAL

## 2023-09-01 DIAGNOSIS — Z74.09 DECREASED FUNCTIONAL MOBILITY AND ENDURANCE: ICD-10-CM

## 2023-09-01 DIAGNOSIS — M25.661 DECREASED RANGE OF MOTION (ROM) OF RIGHT KNEE: Primary | ICD-10-CM

## 2023-09-01 DIAGNOSIS — R29.898 DECREASED STRENGTH OF LOWER EXTREMITY: ICD-10-CM

## 2023-09-01 PROCEDURE — 97112 NEUROMUSCULAR REEDUCATION: CPT | Mod: CQ

## 2023-09-01 PROCEDURE — 97110 THERAPEUTIC EXERCISES: CPT | Mod: CQ

## 2023-09-01 PROCEDURE — 97530 THERAPEUTIC ACTIVITIES: CPT | Mod: CQ

## 2023-09-01 NOTE — PROGRESS NOTES
[facility]   Physical Therapy Treatment Note     Name: Nisha Mancuso  Clinic Number: [id number]    Therapy Diagnosis:   Encounter Diagnoses   Name Primary?    Decreased range of motion (ROM) of right knee Yes    Decreased strength of lower extremity     Decreased functional mobility and endurance        Physician: Nickolas Wolf MD    Visit Date: 9/1/2023    Physician Orders: Physical Therapy Evaluation and Treat  Medical Diagnosis from Referral:   M25.561 (ICD-10-CM) - Right medial knee pain   M23.203 (ICD-10-CM) - Old tear of medial meniscus of right knee, unspecified tear type   Evaluation Date: 5/30/2023  Authorization Period Expiration: 12/31/2023  Plan of Care Expiration: 09/22/2023  Progress Note Due: 09/03/2023  Visit # / Visits authorized: 14/20 (+1 Evaluation)  FOTO: 2/3      Precautions: Multiple Sclerosis     PTA Visit #: 1/5     Time In: 4:35 PM  Time Out: 1725 PM  Total Billable Time: 50 minutes    SUBJECTIVE     Patient reports: she is doing better today and has been  walking longer distances with less pain.     She was compliant with home exercise program.    Response to previous treatment: good    Functional change: less pain with walking and able to walk farther. Squatting has gotten better, pain with full right knee extension, ascending and descending stairs without issues     Pain: 0/10  Location: right knee medial joint line    OBJECTIVE     Objective Measures updated at progress report unless specified.        Treatment     Nisha received the treatments listed below:      THERAPEUTIC EXERCISES to develop strength, endurance, ROM, flexibility, posture, and core stabilization for (8) minutes including: obtaining objective measuements     Intervention Performed Today     Treadmill   8 minutes 1.2 mph   Upright Bike x 6 minutes, Level 2   Standing Calf Raises  3 x 10 no hands   Tailgaters   3 minutes 3 pound bilateral    Standing Calf Stretch x  2 minutes    Prone  Quadriceps Stretch   2 minutes hold bilateral   Objectives Re-assessed     15 minutes                             Plan for Next Visit: progress as tolerated      NEUROMUSCULAR RE-EDUCATION activities to improve: Coordination, Kinesthetic, Sense, and Proprioception for (16) minutes. The following activities were included: x = exercises performed     Neuromuscular Re-Education 9/1/2023    Clamshells  3x10 bilateral red band 1-2  hold at end range of motion    Sidelying hip abduction   3 x 10   Bridges  3x10    LONG ARC QUADs x 3x10 bilateral 5 pound    Prone Hamstring Curls  3x10 bilateral red band bilateral    Straight Leg Raises  3 x10 bilateral    Bosu Squats  3x10 with upper extremity support at parallel bars   Tandem Walking on Foam  10 laps within parallel bars   Heel Taps  3x10 bilateral 4 inch box   Step Ups  X 30 bilateral LOWER EXTREMITY 6 inch box without upper extremity support, 10lbs for first ten reps   Heel raises x 3 x 10 2 hand support   Toe raises x 3 x 10 2 hand support    BOLD= new this visit  Plan for Next Visit: progress as tolerated      THERAPEUTIC ACTIVITIES: to improve functional performance through dynamic activities, for (26)  minutes including:   x = performed today    Therapeutic Activities 9/1/2023    Shuttle Squat x 6 bands 3 x 10 bilateral one extremity    Hip Circles  2 x 10 with circles forward and backwards, fatigue noted with extra rest breaks taken   Hip abduction  x 3 x 12 side lying bilateral    Foam Roll Squats  3x10 no resistance   Standing TKE's x 3 x 10 pink cooks cord bilateral more weight on the lower extremity that is being exercised    Syriac Squats  3x10 green band   Prone Hip Extensions x 3x10 bilateral no resistance   Sit to Stands x 3 x 10 with red band   Squats with chair  3 x 10 10 pound kettle bell    BOLD = new this visit  Plan for Next Visit: hip abduction, hip series     MANUAL THERAPY TECHNIQUES: Joint mobilizations, Soft tissue Mobilization, and mobilization  with movement were applied to the area listed below for (0) minutes, including: x = exercises performed     Manual Intervention 9/1/2023    Soft Tissue Mobilization  Right MEDIAL COLLATERAL LIGAMENT and surrounding tissue   Joint Mobilizations     Mobilization with movement     Functional Dry Needling      BOLD = new this visit  Plan for Next Visit: as needed        Patient Education and Home Exercises     Home Exercises Provided and Patient Education Provided     Education provided:   PURPOSE: Patient educated on the impairments noted above and the effects of physical therapy intervention to improve overall condition and QOL.   EXERCISE: Patient was educated on all the above exercise prior/during/after for proper posture, positioning, and execution for safe performance with home exercise program.   STRENGTH: Patient educated on the importance of improved core and extremity strength in order to improve alignment of the spine and extremities with static positions and dynamic movement.   ASSISTIVE DEV ICE: Patient educated on proper utilization of assistive device for safe and efficient ambulation and to reduce risk of falls  6/16/2023: added home exercise program   8/4/2023: added home exercise program to my chart and gave patient paper copy     Written Home Exercises Provided: Patient instructed to cont prior HEP. Exercises were reviewed and Nisha was able to demonstrate them prior to the end of the session.  Nisha demonstrated good  understanding of the education provided. See EMR under Patient Instructions for exercises provided during therapy sessions    ASSESSMENT   Mrs. Mancuso demonstrates weakness in her left hip with side lying hip abduction and with standing heel raises. She reports no fatigue with long arch quad. Patient has met long term goals 5, and 6.     Nisha Is progressing well towards her goals.   Patient prognosis is Good.     Patient will continue to benefit from skilled outpatient physical  therapy to address the deficits listed in the problem list box on initial evaluation, provide patient/family education and to maximize patient's level of independence in the home and community environment.     Patient's spiritual, cultural and educational needs considered and patient agreeable to plan of care and goals.     Anticipated barriers to physical therapy: co-morbidities, sedentary lifestyle, chronicity of condition, and transportation    Goals:   Reviewed:9/1/2023       Short Term Goals:  6 weeks Status  Date Met   PAIN: Patient will report worst pain of 5/10 in order to progress toward max functional ability and improve quality of life. [] Progressing  [x] Met  [] Not Met  06/30/2023   FUNCTION: Patient will demonstrate improved function as indicated by a functional limitation score of less than or equal to 36 out of 100 on FOTO. [] Progressing  [x] Met  [] Not Met  06/30/2023    MOBILITY: Patient will improve Range of Motion to 50% of stated goals, listed in objective measures above, in order to progress towards independence with functional activities.  [] Progressing  [x] Met  [] Not Met   06/30/2023   STRENGTH: Patient will improve strength to 50% of stated goals, listed in objective measures above, in order to progress towards independence with functional activities.  [] Progressing  [x] Met  [] Not Met   06/30/2023   HEP: Patient will demonstrate independence with HEP in order to progress toward functional independence. [] Progressing  [x] Met  [] Not Met  06/30/2023       Long Term Goals:  12 weeks Status Date Met   PAIN: Patient will report worst pain of 3/10 in order to progress toward max functional ability and improve quality of life [] Progressing  [x] Met  [] Not Met 9/1/2023    FUNCTION: Patient will demonstrate improved function as indicated by a functional limitation score of less than or equal to 29 out of 100 on FOTO. [x] Progressing  [] Met  [] Not Met     MOBILITY: Patient will improve  Range of Motion to stated goals, listed in objective measures above, in order to return to maximal functional potential and improve quality of life. [x] Progressing  [] Met  [] Not Met     STRENGTH: Patient will improve strength to stated goals, listed in objective measures above, in order to improve functional independence and quality of life. [x] Progressing  [] Met  [] Not Met     GAIT: Patient will demonstrate normalized gait mechanics with minimal compensation in order to return to prior level of function. [] Progressing  [x] Met  [] Not Met 9/1/2023    Patient will return to normal ADL's, IADL's, community involvement, recreational activities, and work-related activities with less than or equal to 3/10 pain and maximal function.  [] Progressing  [x] Met  [] Not Met 9/1/2023         PLAN     Continue Plan of Care (POC) and progress per patient tolerance. See treatment section for details on planned progressions next session.  Plan of Care Due: 09/22/2023    Kenny Ayala, PTA

## 2023-09-04 PROBLEM — Z00.00 ROUTINE GENERAL MEDICAL EXAMINATION AT A HEALTH CARE FACILITY: Status: RESOLVED | Noted: 2023-05-31 | Resolved: 2023-09-04

## 2023-09-23 ENCOUNTER — LAB VISIT (OUTPATIENT)
Dept: LAB | Facility: HOSPITAL | Age: 48
End: 2023-09-23
Payer: COMMERCIAL

## 2023-09-23 DIAGNOSIS — Z79.899 ENCOUNTER FOR LONG-TERM (CURRENT) USE OF OTHER MEDICATIONS: Primary | ICD-10-CM

## 2023-09-23 LAB
ALBUMIN SERPL BCP-MCNC: 3.9 G/DL (ref 3.5–5.2)
ALP SERPL-CCNC: 62 U/L (ref 55–135)
ALT SERPL W/O P-5'-P-CCNC: 13 U/L (ref 10–44)
ANION GAP SERPL CALC-SCNC: 7 MMOL/L (ref 8–16)
AST SERPL-CCNC: 18 U/L (ref 10–40)
BASOPHILS # BLD AUTO: 0.03 K/UL (ref 0–0.2)
BASOPHILS NFR BLD: 0.5 % (ref 0–1.9)
BILIRUB SERPL-MCNC: 0.4 MG/DL (ref 0.1–1)
BUN SERPL-MCNC: 11 MG/DL (ref 6–20)
CALCIUM SERPL-MCNC: 9.4 MG/DL (ref 8.7–10.5)
CHLORIDE SERPL-SCNC: 107 MMOL/L (ref 95–110)
CO2 SERPL-SCNC: 27 MMOL/L (ref 23–29)
CREAT SERPL-MCNC: 0.8 MG/DL (ref 0.5–1.4)
DIFFERENTIAL METHOD: ABNORMAL
EOSINOPHIL # BLD AUTO: 0.2 K/UL (ref 0–0.5)
EOSINOPHIL NFR BLD: 3.8 % (ref 0–8)
ERYTHROCYTE [DISTWIDTH] IN BLOOD BY AUTOMATED COUNT: 14.5 % (ref 11.5–14.5)
EST. GFR  (NO RACE VARIABLE): >60 ML/MIN/1.73 M^2
FOLATE SERPL-MCNC: 9.3 NG/ML (ref 4–24)
GLUCOSE SERPL-MCNC: 82 MG/DL (ref 70–110)
HCT VFR BLD AUTO: 36.2 % (ref 37–48.5)
HGB BLD-MCNC: 11.9 G/DL (ref 12–16)
IMM GRANULOCYTES # BLD AUTO: 0 K/UL (ref 0–0.04)
IMM GRANULOCYTES NFR BLD AUTO: 0 % (ref 0–0.5)
LYMPHOCYTES # BLD AUTO: 1.4 K/UL (ref 1–4.8)
LYMPHOCYTES NFR BLD: 24.7 % (ref 18–48)
MCH RBC QN AUTO: 28.4 PG (ref 27–31)
MCHC RBC AUTO-ENTMCNC: 32.9 G/DL (ref 32–36)
MCV RBC AUTO: 86 FL (ref 82–98)
MONOCYTES # BLD AUTO: 0.4 K/UL (ref 0.3–1)
MONOCYTES NFR BLD: 7.5 % (ref 4–15)
NEUTROPHILS # BLD AUTO: 3.6 K/UL (ref 1.8–7.7)
NEUTROPHILS NFR BLD: 63.5 % (ref 38–73)
NRBC BLD-RTO: 0 /100 WBC
PLATELET # BLD AUTO: 365 K/UL (ref 150–450)
PMV BLD AUTO: 10 FL (ref 9.2–12.9)
POTASSIUM SERPL-SCNC: 4.1 MMOL/L (ref 3.5–5.1)
PROT SERPL-MCNC: 7.8 G/DL (ref 6–8.4)
RBC # BLD AUTO: 4.19 M/UL (ref 4–5.4)
SODIUM SERPL-SCNC: 141 MMOL/L (ref 136–145)
VIT B12 SERPL-MCNC: 787 PG/ML (ref 210–950)
WBC # BLD AUTO: 5.59 K/UL (ref 3.9–12.7)

## 2023-09-23 PROCEDURE — 85025 COMPLETE CBC W/AUTO DIFF WBC: CPT

## 2023-09-23 PROCEDURE — 82607 VITAMIN B-12: CPT

## 2023-09-23 PROCEDURE — 80053 COMPREHEN METABOLIC PANEL: CPT

## 2023-09-23 PROCEDURE — 82746 ASSAY OF FOLIC ACID SERUM: CPT

## 2023-09-23 PROCEDURE — 82306 VITAMIN D 25 HYDROXY: CPT

## 2023-09-23 PROCEDURE — 36415 COLL VENOUS BLD VENIPUNCTURE: CPT

## 2023-09-23 PROCEDURE — 84425 ASSAY OF VITAMIN B-1: CPT

## 2023-09-25 LAB — 25(OH)D3+25(OH)D2 SERPL-MCNC: 29 NG/ML (ref 30–96)

## 2023-09-28 LAB — VIT B1 BLD-MCNC: 65 UG/L (ref 38–122)

## 2023-10-09 DIAGNOSIS — I10 HYPERTENSION, UNSPECIFIED TYPE: ICD-10-CM

## 2023-10-09 RX ORDER — LOSARTAN POTASSIUM 50 MG/1
50 TABLET ORAL DAILY
Qty: 90 TABLET | Refills: 0 | Status: SHIPPED | OUTPATIENT
Start: 2023-10-09 | End: 2024-01-08

## 2023-10-09 NOTE — TELEPHONE ENCOUNTER
No care due was identified.  [facility] Embedded Care Due Messages. Reference number: [id number].   10/09/2023 6:44:59 AM CDT

## 2023-10-17 ENCOUNTER — DOCUMENTATION ONLY (OUTPATIENT)
Dept: REHABILITATION | Facility: HOSPITAL | Age: 48
End: 2023-10-17
Payer: COMMERCIAL

## 2023-10-17 PROBLEM — R29.898 DECREASED STRENGTH OF LOWER EXTREMITY: Status: RESOLVED | Noted: 2023-05-30 | Resolved: 2023-10-17

## 2023-10-17 PROBLEM — Z74.09 DECREASED FUNCTIONAL MOBILITY AND ENDURANCE: Status: RESOLVED | Noted: 2023-05-30 | Resolved: 2023-10-17

## 2023-10-17 PROBLEM — M25.661 DECREASED RANGE OF MOTION (ROM) OF RIGHT KNEE: Status: RESOLVED | Noted: 2023-05-30 | Resolved: 2023-10-17

## 2023-10-17 NOTE — PROGRESS NOTES
[facility]  Physical Therapy Discharge Note    Name: Nisha Mancuso  [facility] Number: [id number]    Therapy Diagnosis: No diagnosis found.  Physician: No ref. provider found    Physician Orders: Physical Therapy Evaluation and Treat  Medical Diagnosis from Referral:   M25.561 (ICD-10-CM) - Right medial knee pain   M23.203 (ICD-10-CM) - Old tear of medial meniscus of right knee, unspecified tear type   Evaluation Date: 5/30/2023      Date of Last visit: 08/09/2023  Total Visits Received: 13    ASSESSMENT        Discharge reason: Patient has not attended therapy since 08/09/2023    Discharge FOTO Score: Not Applicable     Goals: See last treatment note.    PLAN   This patient is discharged from PT.    Lena Downing, PT, DPT

## 2023-10-20 ENCOUNTER — TELEPHONE (OUTPATIENT)
Dept: INTERNAL MEDICINE | Facility: CLINIC | Age: 48
End: 2023-10-20
Payer: COMMERCIAL

## 2023-10-20 NOTE — TELEPHONE ENCOUNTER
----- Message from Yulitio Luis Eduardo sent at 10/20/2023  2:03 PM CDT -----  Contact: Nisha  .Type:  Patient Returning Call    Who Called: Nisha  Who Left Message for Patient:Nurse  Does the patient know what this is regarding?:Yes  Would the patient rather a call back or a response via [facility]? Call back   Best Call Back Number:.[phone]    Additional Information: Pt states that she is available on Thursday or Friday.  If no answer please respond in patient portal    Thanks

## 2023-11-07 ENCOUNTER — OFFICE VISIT (OUTPATIENT)
Dept: URGENT CARE | Facility: CLINIC | Age: 48
End: 2023-11-07
Payer: COMMERCIAL

## 2023-11-07 VITALS
HEIGHT: 65 IN | TEMPERATURE: 99 F | WEIGHT: 169.63 LBS | RESPIRATION RATE: 18 BRPM | SYSTOLIC BLOOD PRESSURE: 144 MMHG | OXYGEN SATURATION: 98 % | HEART RATE: 67 BPM | DIASTOLIC BLOOD PRESSURE: 95 MMHG | BODY MASS INDEX: 28.26 KG/M2

## 2023-11-07 DIAGNOSIS — R51.9 GENERALIZED HEADACHE: ICD-10-CM

## 2023-11-07 DIAGNOSIS — J06.0 SORE THROAT AND LARYNGITIS: ICD-10-CM

## 2023-11-07 DIAGNOSIS — U07.1 COVID-19: Primary | ICD-10-CM

## 2023-11-07 DIAGNOSIS — U07.1 COVID-19 VIRUS DETECTED: ICD-10-CM

## 2023-11-07 DIAGNOSIS — R52 BODY ACHES: ICD-10-CM

## 2023-11-07 LAB
CTP QC/QA: YES
CTP QC/QA: YES
POC MOLECULAR INFLUENZA A AGN: NEGATIVE
POC MOLECULAR INFLUENZA B AGN: NEGATIVE
SARS-COV-2 AG RESP QL IA.RAPID: POSITIVE

## 2023-11-07 PROCEDURE — 87811 SARS CORONAVIRUS 2 ANTIGEN POCT, MANUAL READ: ICD-10-PCS | Mod: QW,S$GLB,,

## 2023-11-07 PROCEDURE — 99203 OFFICE O/P NEW LOW 30 MIN: CPT | Mod: S$GLB,,,

## 2023-11-07 PROCEDURE — 99203 PR OFFICE/OUTPT VISIT, NEW, LEVL III, 30-44 MIN: ICD-10-PCS | Mod: S$GLB,,,

## 2023-11-07 PROCEDURE — 87502 POCT INFLUENZA A/B MOLECULAR: ICD-10-PCS | Mod: QW,S$GLB,,

## 2023-11-07 PROCEDURE — 87502 INFLUENZA DNA AMP PROBE: CPT | Mod: QW,S$GLB,,

## 2023-11-07 PROCEDURE — 87811 SARS-COV-2 COVID19 W/OPTIC: CPT | Mod: QW,S$GLB,,

## 2023-11-07 NOTE — LETTER
"    [address] , [address]  [facility] [address]  Phone: [phone]  Fax: [phone]      Return to School    Nisha Mancuso (Pamela) was seen and treated in our clinic on 11/7/2023.     COVID-19 is present in our communities across the [facility]. There is limited testing for COVID at this time, so not all patients can be tested. In this situation, your employee meets the following criteria:    Nisha Mancuso has met the criteria for COVID-19 testing and has a POSITIVE result. She can return to work once they are asymptomatic for 24 hours without the use of fever reducing medications AND at least five days from the first day of onset of symptoms. A mask is recommended for 5 days post quarantine.     If you have any questions or concerns, or if I can be of further assistance, please do not hesitate to contact me.    Sincerely,           Becky Rizzo PA-C  "

## 2023-11-07 NOTE — LETTER
"    [address] , [address]  [facility] [address]  Phone: [phone]  Fax: [phone]      Return to School    Nisha Mancuso (Pamela) was seen and treated in our clinic on 11/7/2023.     COVID-19 is present in our communities across the [facility]. There is limited testing for COVID at this time, so not all patients can be tested. In this situation, your employee meets the following criteria:    Nisha Mancuso has met the criteria for COVID-19 testing and has a POSITIVE result. She can return to school once they are asymptomatic for 24 hours without the use of fever reducing medications AND at least five days from the first positive result. A mask is recommended for 5 days post quarantine.     If you have any questions or concerns, or if I can be of further assistance, please do not hesitate to contact me.    Sincerely,           Becky Rizzo PA-C  "

## 2023-11-07 NOTE — PROGRESS NOTES
"Subjective:      Patient ID: Nisha Mancuso is a 48 y.o. female.    Vitals:  height is 5' 5" (1.651 m) and weight is 76.9 kg (169 lb 10.3 oz). Her oral temperature is 98.8 °F (37.1 °C). Her blood pressure is 144/95 (abnormal) and her pulse is 67. Her respiration is 18 and oxygen saturation is 98%.     Chief Complaint: Headache    Nisha Mancuso  is a 48 y.o. female whom presents to urgent care with complaints of generalized h/a, body aches, runny nose and congestion. Patient reports symptoms began 3 days ago with daughter having similar symptoms. Patient has taken ibuprofen for symptom relief, last dose around 2pm. Tried cough syrup/drops. Patient reports mild improvement with treatment. Denies n/v, weakness on one side, vision changes, numbness/tingling, neck stiffness. Denies known fever but states she felt warm. NKDA.        Headache   This is a new problem. The current episode started in the past 7 days. The problem occurs constantly. The problem has been unchanged. The pain is located in the Bilateral region. The pain does not radiate. The pain quality is not similar to prior headaches. The quality of the pain is described as squeezing. The pain is at a severity of 10/10. The pain is severe. Associated symptoms include coughing, muscle aches, sinus pressure and a sore throat (scratchy). Pertinent negatives include no abdominal pain, abnormal behavior, anorexia, blurred vision, dizziness (none at present), drainage, ear pain, eye pain, eye redness, eye watering, facial sweating, fever, hearing loss, insomnia, loss of balance, nausea, numbness, phonophobia, photophobia, rhinorrhea, scalp tenderness, seizures, swollen glands, tingling, tinnitus, visual change, vomiting, weakness or weight loss. Nothing aggravates the symptoms. She has tried NSAIDs for the symptoms. Her past medical history is significant for hypertension. There is no history of cancer, cluster headaches, immunosuppression, migraine headaches, " migraines in the family, obesity, pseudotumor cerebri, recent head traumas, sinus disease or TMJ.       Constitution: Negative for chills, fever and generalized weakness.   HENT:  Positive for sinus pressure, sore throat (scratchy) and voice change (hoarseness improved). Negative for ear pain, tinnitus and hearing loss.    Neck: Positive for painful lymph nodes. Negative for neck stiffness.   Cardiovascular:  Negative for chest pain.   Eyes:  Negative for eye pain, eye redness, photophobia and blurred vision.   Respiratory:  Positive for cough. Negative for shortness of breath.    Gastrointestinal:  Negative for abdominal pain, nausea and vomiting.   Musculoskeletal:  Positive for muscle ache (general bodyaches).   Skin:  Negative for rash.   Allergic/Immunologic: Negative for sneezing.   Neurological:  Positive for history of vertigo and headaches. Negative for dizziness (none at present), passing out, speech difficulty, loss of balance, history of migraines, numbness, tingling and seizures.   Hematologic/Lymphatic: Positive for swollen lymph nodes.   Psychiatric/Behavioral:  The patient does not have insomnia.       Objective:     Vitals:    11/07/23 1735   BP: (!) 144/95   Pulse: 67   Resp: 18   Temp: 98.8 °F (37.1 °C)       Physical Exam   Constitutional: She is oriented to person, place, and time. She appears well-developed. She is cooperative.  Non-toxic appearance. She does not appear ill. No distress. awake  HENT:   Head: Normocephalic and atraumatic.   Ears:   Right Ear: Hearing, tympanic membrane, external ear and ear canal normal. No no drainage, swelling, tenderness or cerumen not present. Tympanic membrane is not erythematous and not bulging. impacted cerumen  Left Ear: Hearing, tympanic membrane, external ear and ear canal normal. No no drainage, swelling, tenderness or cerumen not present. Tympanic membrane is not erythematous and not bulging. impacted cerumen  Nose: Nose normal. No mucosal edema,  rhinorrhea or nasal deformity. No epistaxis. Right sinus exhibits no maxillary sinus tenderness and no frontal sinus tenderness. Left sinus exhibits no maxillary sinus tenderness and no frontal sinus tenderness.   Mouth/Throat: Uvula is midline, oropharynx is clear and moist and mucous membranes are normal. Mucous membranes are moist. No trismus in the jaw. Normal dentition. No uvula swelling. No oropharyngeal exudate, posterior oropharyngeal edema, posterior oropharyngeal erythema or cobblestoning. No tonsillar exudate.   Eyes: Conjunctivae and lids are normal. Pupils are equal, round, and reactive to light. Right eye exhibits no discharge. Left eye exhibits no discharge. No scleral icterus. Extraocular movement intact   Neck: Trachea normal and phonation normal. Neck supple. No edema present. No erythema present. No neck rigidity present.   Cardiovascular: Normal rate, regular rhythm, normal heart sounds and normal pulses.   Pulmonary/Chest: Effort normal and breath sounds normal. No respiratory distress. She has no decreased breath sounds. She has no wheezes. She has no rhonchi. She has no rales.   Abdominal: Normal appearance.   Musculoskeletal: Normal range of motion.         General: No deformity. Normal range of motion.   Neurological: She is alert and oriented to person, place, and time. She displays no weakness. She exhibits normal muscle tone. Coordination and gait normal.   Skin: Skin is warm, dry, intact, not diaphoretic, not pale and no rash.   Psychiatric: Her speech is normal and behavior is normal. Judgment and thought content normal.   Nursing note and vitals reviewed.      Assessment:     1. COVID-19    2. Body aches    3. Generalized headache    4. Sore throat and laryngitis      COVID risk score: 2    Results for orders placed or performed in visit on 11/07/23   POCT Influenza A/B MOLECULAR   Result Value Ref Range    POC Molecular Influenza A Ag Negative Negative, Not Reported    POC Molecular  Influenza B Ag Negative Negative, Not Reported     Acceptable Yes    SARS Coronavirus 2 Antigen, POCT Manual Read   Result Value Ref Range    SARS Coronavirus 2 Antigen Positive (A) Negative     Acceptable Yes        Plan:       COVID-19    Body aches  -     POCT Influenza A/B MOLECULAR  -     SARS Coronavirus 2 Antigen, POCT Manual Read    Generalized headache    Sore throat and laryngitis      Patient Instructions   You have tested positive for COVID-19 today.      ISOLATION  If you tested positive and do not have symptoms, you must isolate for 5 days starting on the day of the positive test. I    If you tested positive and have symptoms, you must isolate for 5 days starting on the day of the first symptoms,  not the day of the positive test.     This is the most important part, both the CDC and the [facility] emphasize that you do not test out of isolation.     After 5 days, if your symptoms have improved and you have not had fever on day 5, you can return to the community on day 6- NO TESTING REQUIRED!      In fact, we do not retest if you were positive in the last 90 days.    After your 5 days of isolation are completed, the CDC recommends strict mask use for the first 5 days that you come out of isolation. PLEASE FOLLOW CDC GUIDELINES REGARDING TRAVEL AFTER COVID INFECTION.    Return to Urgent Care or go to ER if symptoms worsen or fail to improve.  Follow up with PCP as recommended for further management.     Your symptoms should begin to improve by Day 5 of the infection-- if symptoms worsen, you develop a new fever, shortness of breath, worsening shortness of breath with activity, chest pain, worsening cough, you must return to Urgent Care or go to the ER.    PLEASE READ YOUR DISCHARGE INSTRUCTIONS ENTIRELY AS IT CONTAINS IMPORTANT INFORMATION.      Please drink plenty of fluids.    Please get plenty of rest.    Please return here or go to the Emergency Department for any concerns or  worsening of condition.      Tylenol or ibuprofen can also be used as directed for pain and fever unless you have an allergy to them or medical condition such as stomach ulcers, kidney or liver disease or blood thinners etc for which you should not be taking these type of medications.   YOU CAN ALTERNATE TYLENOL AND IBUPROFEN EVERY 3-4 HOURS. Take 1000mg (2 pills) of Extra Strength Acetaminophen (Tylenol) every 6 hours and 600mg (3 pills) of Ibuprofen (Motrin/Advil) every 6 hours, alternating the two so that every 3 hours you take one or the other. Start with the Tylenol, then 3 hours later take the Ibuprofen, then 3 hours later take the Tylenol again, and so on.         For your allergy symptoms and/or runny nose, sinus/ear pressure, congestion:        - You can take over-the-counter claritin, zyrtec, allegra, or xyzal as directed. These are antihistamines that can help with runny nose, nasal congestion, sneezing, and helps to dry up post-nasal drip, which usually causes sore throat and cough.               - If you do NOT have high blood pressure, you may use a decongestant form (D)  of this medication (ie. Claritin- D, zyrtec-D, allegra-D) or if you do not take the D form, you can take sudafed (pseudoephedrine) over the counter, which is a decongestant. Do NOT take two decongestant (D) medications at the same time (such as mucinex-D and claritin-D or plain sudafed and claritin D). Dextromethorphan (DM) is a cough suppressant over the counter (ie. mucinex DM, robitussin, delsym; dayquil/nyquil has DM as well.)    -If you DO have high blood pressure, AVOID DECONGESTANTS (I.e., Phenylephrine and Pseudoephedrine). You may take Coricidin HBP for sinus congestion and Delsym for cough.        - You can take plain Mucinex (guaifenesin) 1200 mg twice a day to help loosen mucous.       Use over the counter flonase or nasocort: one spray each nostril twice daily OR two sprays each nostril once daily until nares dry out,  unless you have Glaucoma.   If you find this dries your nose out or your nose bleeds, try using over the counter nasal saline a few minutes prior to using the flonase to moisten the lining of your nose and throughout the day as needed.   Flonase/nasal spray use directions:  1) Use once per day.  2) Blow nose first.  3) Close one nostril and spray flonase up the other nostril while inhaling gently.   4) If you inhale to aggressively, you will have a bitter taste in the back of your mouth.       You can try breathe right strips at night to help you breathe.  A cool mist humidifier in bedroom may help with cough and relieve stuffy nose.     Sinus rinses DO NOT USE TAP WATER, if you must, water must be a rolling boil for 1 minute, let it cool, then use.  May use distilled water, or over the counter nasal saline rinses.  Vics vapor rub in shower to help open nasal passages.  May use nasal gel to keep passages moisturized.  May use Nasal saline sprays during the day for added relief of congestion.   For those who go to the gym, please do not use the sauna or steam room now to clear sinuses.      Sore throat recommendations: Warm fluids, warm salt water gargles, throat lozenges, tea, honey, soup, rest, hydration.      Cough     Honey with marlen to soothe your throat    Robitussin or Delsyum for cough suppressant for dry cough.    Mucinex DM or products containing Guaifenesin or Dextromethorphan for expectorant (wet cough).    Please follow up with your primary care doctor or specialist in the next 48-72hrs as needed and if no improvement    If you  smoke, please stop smoking.    Please return or see your primary care doctor if you develop new or worsening symptoms.     Please arrange follow up with your primary medical clinic as soon as possible. You must understand that you've received an Urgent Care treatment only and that you may be released before all of your medical problems are known or treated. You, the patient,  will arrange for follow up as instructed. If your symptoms worsen or fail to improve you should go to the Emergency Room.      PLEASE READ YOUR DISCHARGE INSTRUCTIONS ENTIRELY AS IT CONTAINS IMPORTANT INFORMATION.    - Get rest and drink plenty of fluids    - You can use tylenol or ibuprofen as directed as long as you don't have any allergies to these medications or medical conditions such as ulcers, liver or kidney disease or blood thinners etc that would prevent you from taking these meds.     - Slowly change from laying, sitting, and standing positions to help lightheadedness/dizziness.    - Sit or lie down immediately when dizziness/lightheaded to avoid further injury.     - Watch for increase pain, fever, vomiting, neck pain or mental confusion.    Please go to the emergency room if you experience chest pain, shortness of breath, funny heart beats, headache, blurred vision, weakness in one arm or leg, slurred speech, numbness, inability to walk or talk, confusion.     Try to avoid common triggers of headaches (stress, menstruation, visual stimuli, weather changes, nitrates, fasting, wine, lack of sleep, smoking, odors, chocolate)    Please return or see your primary care doctor if you develop new or worsening symptoms.       Please arrange follow up with your primary medical clinic as soon as possible. You must understand that you've received an Urgent Care treatment only and that you may be released before all of your medical problems are known or treated. You, the patient, will arrange for follow up as instructed. If your symptoms worsen or fail to improve you should go to the Emergency Room.    WE CANNOT RULE OUT ALL POSSIBLE CAUSES OF YOUR SYMPTOMS IN THE URGENT CARE SETTING PLEASE GO TO THE ER IF YOU FEELS YOUR CONDITION IS WORSENING OR YOU WOULD LIKE EMERGENT EVALUATION.        Medical Decision Making:   History:   Old Medical Records: I decided to obtain old medical records.  Clinical Tests:   Lab Tests:  Ordered and Reviewed       <> Summary of Lab: COVID positive  Flu negative

## 2023-11-08 NOTE — PATIENT INSTRUCTIONS
You have tested positive for COVID-19 today.      ISOLATION  If you tested positive and do not have symptoms, you must isolate for 5 days starting on the day of the positive test. I    If you tested positive and have symptoms, you must isolate for 5 days starting on the day of the first symptoms,  not the day of the positive test.     This is the most important part, both the CDC and the [facility] emphasize that you do not test out of isolation.     After 5 days, if your symptoms have improved and you have not had fever on day 5, you can return to the community on day 6- NO TESTING REQUIRED!      In fact, we do not retest if you were positive in the last 90 days.    After your 5 days of isolation are completed, the CDC recommends strict mask use for the first 5 days that you come out of isolation. PLEASE FOLLOW CDC GUIDELINES REGARDING TRAVEL AFTER COVID INFECTION.    Return to Urgent Care or go to ER if symptoms worsen or fail to improve.  Follow up with PCP as recommended for further management.     Your symptoms should begin to improve by Day 5 of the infection-- if symptoms worsen, you develop a new fever, shortness of breath, worsening shortness of breath with activity, chest pain, worsening cough, you must return to Urgent Care or go to the ER.    PLEASE READ YOUR DISCHARGE INSTRUCTIONS ENTIRELY AS IT CONTAINS IMPORTANT INFORMATION.      Please drink plenty of fluids.    Please get plenty of rest.    Please return here or go to the Emergency Department for any concerns or worsening of condition.      Tylenol or ibuprofen can also be used as directed for pain and fever unless you have an allergy to them or medical condition such as stomach ulcers, kidney or liver disease or blood thinners etc for which you should not be taking these type of medications.   YOU CAN ALTERNATE TYLENOL AND IBUPROFEN EVERY 3-4 HOURS. Take 1000mg (2 pills) of Extra Strength Acetaminophen (Tylenol) every 6 hours and 600mg (3 pills) of  Ibuprofen (Motrin/Advil) every 6 hours, alternating the two so that every 3 hours you take one or the other. Start with the Tylenol, then 3 hours later take the Ibuprofen, then 3 hours later take the Tylenol again, and so on.         For your allergy symptoms and/or runny nose, sinus/ear pressure, congestion:        - You can take over-the-counter claritin, zyrtec, allegra, or xyzal as directed. These are antihistamines that can help with runny nose, nasal congestion, sneezing, and helps to dry up post-nasal drip, which usually causes sore throat and cough.               - If you do NOT have high blood pressure, you may use a decongestant form (D)  of this medication (ie. Claritin- D, zyrtec-D, allegra-D) or if you do not take the D form, you can take sudafed (pseudoephedrine) over the counter, which is a decongestant. Do NOT take two decongestant (D) medications at the same time (such as mucinex-D and claritin-D or plain sudafed and claritin D). Dextromethorphan (DM) is a cough suppressant over the counter (ie. mucinex DM, robitussin, delsym; dayquil/nyquil has DM as well.)    -If you DO have high blood pressure, AVOID DECONGESTANTS (I.e., Phenylephrine and Pseudoephedrine). You may take Coricidin HBP for sinus congestion and Delsym for cough.        - You can take plain Mucinex (guaifenesin) 1200 mg twice a day to help loosen mucous.       Use over the counter flonase or nasocort: one spray each nostril twice daily OR two sprays each nostril once daily until nares dry out, unless you have Glaucoma.   If you find this dries your nose out or your nose bleeds, try using over the counter nasal saline a few minutes prior to using the flonase to moisten the lining of your nose and throughout the day as needed.   Flonase/nasal spray use directions:  1) Use once per day.  2) Blow nose first.  3) Close one nostril and spray flonase up the other nostril while inhaling gently.   4) If you inhale to aggressively, you will  have a bitter taste in the back of your mouth.       You can try breathe right strips at night to help you breathe.  A cool mist humidifier in bedroom may help with cough and relieve stuffy nose.     Sinus rinses DO NOT USE TAP WATER, if you must, water must be a rolling boil for 1 minute, let it cool, then use.  May use distilled water, or over the counter nasal saline rinses.  Vics vapor rub in shower to help open nasal passages.  May use nasal gel to keep passages moisturized.  May use Nasal saline sprays during the day for added relief of congestion.   For those who go to the gym, please do not use the sauna or steam room now to clear sinuses.      Sore throat recommendations: Warm fluids, warm salt water gargles, throat lozenges, tea, honey, soup, rest, hydration.      Cough     Honey with marlen to soothe your throat    Robitussin or Delsyum for cough suppressant for dry cough.    Mucinex DM or products containing Guaifenesin or Dextromethorphan for expectorant (wet cough).    Please follow up with your primary care doctor or specialist in the next 48-72hrs as needed and if no improvement    If you  smoke, please stop smoking.    Please return or see your primary care doctor if you develop new or worsening symptoms.     Please arrange follow up with your primary medical clinic as soon as possible. You must understand that you've received an Urgent Care treatment only and that you may be released before all of your medical problems are known or treated. You, the patient, will arrange for follow up as instructed. If your symptoms worsen or fail to improve you should go to the Emergency Room.      PLEASE READ YOUR DISCHARGE INSTRUCTIONS ENTIRELY AS IT CONTAINS IMPORTANT INFORMATION.    - Get rest and drink plenty of fluids    - You can use tylenol or ibuprofen as directed as long as you don't have any allergies to these medications or medical conditions such as ulcers, liver or kidney disease or blood thinners  etc that would prevent you from taking these meds.     - Slowly change from laying, sitting, and standing positions to help lightheadedness/dizziness.    - Sit or lie down immediately when dizziness/lightheaded to avoid further injury.     - Watch for increase pain, fever, vomiting, neck pain or mental confusion.    Please go to the emergency room if you experience chest pain, shortness of breath, funny heart beats, headache, blurred vision, weakness in one arm or leg, slurred speech, numbness, inability to walk or talk, confusion.     Try to avoid common triggers of headaches (stress, menstruation, visual stimuli, weather changes, nitrates, fasting, wine, lack of sleep, smoking, odors, chocolate)    Please return or see your primary care doctor if you develop new or worsening symptoms.       Please arrange follow up with your primary medical clinic as soon as possible. You must understand that you've received an Urgent Care treatment only and that you may be released before all of your medical problems are known or treated. You, the patient, will arrange for follow up as instructed. If your symptoms worsen or fail to improve you should go to the Emergency Room.    WE CANNOT RULE OUT ALL POSSIBLE CAUSES OF YOUR SYMPTOMS IN THE URGENT CARE SETTING PLEASE GO TO THE ER IF YOU FEELS YOUR CONDITION IS WORSENING OR YOU WOULD LIKE EMERGENT EVALUATION.

## 2023-11-09 ENCOUNTER — PATIENT MESSAGE (OUTPATIENT)
Dept: RESEARCH | Facility: HOSPITAL | Age: 48
End: 2023-11-09
Payer: COMMERCIAL

## 2024-01-08 DIAGNOSIS — I10 HYPERTENSION, UNSPECIFIED TYPE: ICD-10-CM

## 2024-01-08 RX ORDER — LOSARTAN POTASSIUM 50 MG/1
50 TABLET ORAL
Qty: 90 TABLET | Refills: 0 | Status: SHIPPED | OUTPATIENT
Start: 2024-01-08 | End: 2024-02-02 | Stop reason: SDUPTHER

## 2024-01-08 NOTE — TELEPHONE ENCOUNTER
Care Due:                  Date            Visit Type   Department     Provider  --------------------------------------------------------------------------------                                EP -                              PRIMARY      [facility]  Last Visit: 05-      CARE ([facility])   DONNIE Apple                              EP -                              PRIMARY      [facility]  Next Visit: 02-      CARE ([facility])   DONNIE Apple                                                            Last  Test          Frequency    Reason                     Performed    Due Date  --------------------------------------------------------------------------------    TSH.........  12 months..  levothyroxine............  05- 05-    [facility] Embedded Care Due Messages. Reference number: [id number].   1/08/2024 1:11:35 PM CST

## 2024-01-08 NOTE — TELEPHONE ENCOUNTER
Refill Routing Note   Medication(s) are not appropriate for processing by [facility] for the following reason(s):        Required vitals abnormal    [facility] action(s):  Defer               Appointments  past 12m or future 3m with PCP    Date Provider   Last Visit   5/31/2023 Kit Apple MD   Next Visit   2/22/2024 Kit Apple MD   ED visits in past 90 days: 0        Note composed:2:11 PM 01/08/2024

## 2024-01-22 ENCOUNTER — PATIENT MESSAGE (OUTPATIENT)
Dept: PSYCHIATRY | Facility: CLINIC | Age: 49
End: 2024-01-22
Payer: COMMERCIAL

## 2024-02-02 ENCOUNTER — PATIENT MESSAGE (OUTPATIENT)
Dept: INTERNAL MEDICINE | Facility: CLINIC | Age: 49
End: 2024-02-02
Payer: COMMERCIAL

## 2024-02-02 DIAGNOSIS — I10 HYPERTENSION, UNSPECIFIED TYPE: ICD-10-CM

## 2024-02-02 RX ORDER — LOSARTAN POTASSIUM 50 MG/1
50 TABLET ORAL DAILY
Qty: 90 TABLET | Refills: 0 | Status: SHIPPED | OUTPATIENT
Start: 2024-02-02 | End: 2024-02-22 | Stop reason: SDUPTHER

## 2024-02-02 NOTE — TELEPHONE ENCOUNTER
No care due was identified.  [facility] Embedded Care Due Messages. Reference number: [id number].   2/02/2024 2:22:53 PM CST

## 2024-02-03 ENCOUNTER — LAB VISIT (OUTPATIENT)
Dept: LAB | Facility: HOSPITAL | Age: 49
End: 2024-02-03
Attending: PSYCHIATRY & NEUROLOGY
Payer: COMMERCIAL

## 2024-02-03 DIAGNOSIS — Z79.899 ENCOUNTER FOR LONG-TERM (CURRENT) USE OF OTHER MEDICATIONS: ICD-10-CM

## 2024-02-03 DIAGNOSIS — G35 MULTIPLE SCLEROSIS: ICD-10-CM

## 2024-02-03 LAB
ALBUMIN SERPL BCP-MCNC: 3.9 G/DL (ref 3.5–5.2)
ALP SERPL-CCNC: 69 U/L (ref 55–135)
ALT SERPL W/O P-5'-P-CCNC: 13 U/L (ref 10–44)
ANION GAP SERPL CALC-SCNC: 10 MMOL/L (ref 8–16)
AST SERPL-CCNC: 17 U/L (ref 10–40)
BASOPHILS # BLD AUTO: 0.03 K/UL (ref 0–0.2)
BASOPHILS NFR BLD: 0.5 % (ref 0–1.9)
BILIRUB SERPL-MCNC: 0.4 MG/DL (ref 0.1–1)
BUN SERPL-MCNC: 14 MG/DL (ref 6–20)
CALCIUM SERPL-MCNC: 9.2 MG/DL (ref 8.7–10.5)
CHLORIDE SERPL-SCNC: 109 MMOL/L (ref 95–110)
CO2 SERPL-SCNC: 24 MMOL/L (ref 23–29)
CREAT SERPL-MCNC: 0.8 MG/DL (ref 0.5–1.4)
DIFFERENTIAL METHOD BLD: ABNORMAL
EOSINOPHIL # BLD AUTO: 0.2 K/UL (ref 0–0.5)
EOSINOPHIL NFR BLD: 2.9 % (ref 0–8)
ERYTHROCYTE [DISTWIDTH] IN BLOOD BY AUTOMATED COUNT: 14.8 % (ref 11.5–14.5)
EST. GFR  (NO RACE VARIABLE): >60 ML/MIN/1.73 M^2
FOLATE SERPL-MCNC: 9.8 NG/ML (ref 4–24)
GLUCOSE SERPL-MCNC: 73 MG/DL (ref 70–110)
HCT VFR BLD AUTO: 39.7 % (ref 37–48.5)
HGB BLD-MCNC: 12.5 G/DL (ref 12–16)
IMM GRANULOCYTES # BLD AUTO: 0.01 K/UL (ref 0–0.04)
IMM GRANULOCYTES NFR BLD AUTO: 0.2 % (ref 0–0.5)
LYMPHOCYTES # BLD AUTO: 1.4 K/UL (ref 1–4.8)
LYMPHOCYTES NFR BLD: 22.3 % (ref 18–48)
MCH RBC QN AUTO: 28.7 PG (ref 27–31)
MCHC RBC AUTO-ENTMCNC: 31.5 G/DL (ref 32–36)
MCV RBC AUTO: 91 FL (ref 82–98)
MONOCYTES # BLD AUTO: 0.4 K/UL (ref 0.3–1)
MONOCYTES NFR BLD: 6.9 % (ref 4–15)
NEUTROPHILS # BLD AUTO: 4.1 K/UL (ref 1.8–7.7)
NEUTROPHILS NFR BLD: 67.2 % (ref 38–73)
NRBC BLD-RTO: 0 /100 WBC
PLATELET # BLD AUTO: 338 K/UL (ref 150–450)
PMV BLD AUTO: 11.3 FL (ref 9.2–12.9)
POTASSIUM SERPL-SCNC: 4.2 MMOL/L (ref 3.5–5.1)
PROT SERPL-MCNC: 7.7 G/DL (ref 6–8.4)
RBC # BLD AUTO: 4.35 M/UL (ref 4–5.4)
SODIUM SERPL-SCNC: 143 MMOL/L (ref 136–145)
VIT B12 SERPL-MCNC: >2000 PG/ML (ref 210–950)
WBC # BLD AUTO: 6.11 K/UL (ref 3.9–12.7)

## 2024-02-03 PROCEDURE — 80053 COMPREHEN METABOLIC PANEL: CPT | Performed by: PSYCHIATRY & NEUROLOGY

## 2024-02-03 PROCEDURE — 82607 VITAMIN B-12: CPT | Performed by: PSYCHIATRY & NEUROLOGY

## 2024-02-03 PROCEDURE — 82746 ASSAY OF FOLIC ACID SERUM: CPT | Performed by: PSYCHIATRY & NEUROLOGY

## 2024-02-03 PROCEDURE — 85025 COMPLETE CBC W/AUTO DIFF WBC: CPT | Performed by: PSYCHIATRY & NEUROLOGY

## 2024-02-03 PROCEDURE — 86038 ANTINUCLEAR ANTIBODIES: CPT | Performed by: PSYCHIATRY & NEUROLOGY

## 2024-02-03 PROCEDURE — 84425 ASSAY OF VITAMIN B-1: CPT | Performed by: PSYCHIATRY & NEUROLOGY

## 2024-02-03 PROCEDURE — 83520 IMMUNOASSAY QUANT NOS NONAB: CPT | Performed by: PSYCHIATRY & NEUROLOGY

## 2024-02-05 LAB — ANA SER QL IF: NORMAL

## 2024-02-06 LAB — TSH RECEP AB SER-ACNC: 1.21 IU/L (ref 0–1.75)

## 2024-02-09 LAB — VIT B1 BLD-MCNC: 64 UG/L (ref 38–122)

## 2024-02-22 ENCOUNTER — OFFICE VISIT (OUTPATIENT)
Dept: INTERNAL MEDICINE | Facility: CLINIC | Age: 49
End: 2024-02-22
Payer: COMMERCIAL

## 2024-02-22 VITALS
BODY MASS INDEX: 27.95 KG/M2 | HEIGHT: 65 IN | WEIGHT: 167.75 LBS | TEMPERATURE: 98 F | DIASTOLIC BLOOD PRESSURE: 70 MMHG | OXYGEN SATURATION: 98 % | HEART RATE: 93 BPM | SYSTOLIC BLOOD PRESSURE: 120 MMHG

## 2024-02-22 DIAGNOSIS — I10 HYPERTENSION, UNSPECIFIED TYPE: ICD-10-CM

## 2024-02-22 DIAGNOSIS — G35 MS (MULTIPLE SCLEROSIS): Primary | Chronic | ICD-10-CM

## 2024-02-22 DIAGNOSIS — E89.0 POSTOPERATIVE HYPOTHYROIDISM: ICD-10-CM

## 2024-02-22 DIAGNOSIS — Z85.850 HISTORY OF THYROID CANCER: ICD-10-CM

## 2024-02-22 DIAGNOSIS — Z00.00 PREVENTATIVE HEALTH CARE: ICD-10-CM

## 2024-02-22 PROCEDURE — 4010F ACE/ARB THERAPY RXD/TAKEN: CPT | Mod: CPTII,S$GLB,, | Performed by: INTERNAL MEDICINE

## 2024-02-22 PROCEDURE — 99999 PR PBB SHADOW E&M-EST. PATIENT-LVL IV: CPT | Mod: PBBFAC,,, | Performed by: INTERNAL MEDICINE

## 2024-02-22 PROCEDURE — 3074F SYST BP LT 130 MM HG: CPT | Mod: CPTII,S$GLB,, | Performed by: INTERNAL MEDICINE

## 2024-02-22 PROCEDURE — 99213 OFFICE O/P EST LOW 20 MIN: CPT | Mod: S$GLB,,, | Performed by: INTERNAL MEDICINE

## 2024-02-22 PROCEDURE — 1159F MED LIST DOCD IN RCRD: CPT | Mod: CPTII,S$GLB,, | Performed by: INTERNAL MEDICINE

## 2024-02-22 PROCEDURE — 3078F DIAST BP <80 MM HG: CPT | Mod: CPTII,S$GLB,, | Performed by: INTERNAL MEDICINE

## 2024-02-22 PROCEDURE — 3008F BODY MASS INDEX DOCD: CPT | Mod: CPTII,S$GLB,, | Performed by: INTERNAL MEDICINE

## 2024-02-22 RX ORDER — LEVOTHYROXINE SODIUM 137 UG/1
137 TABLET ORAL
Qty: 90 TABLET | Refills: 2 | Status: SHIPPED | OUTPATIENT
Start: 2024-02-22

## 2024-02-22 RX ORDER — LEVOTHYROXINE SODIUM 137 UG/1
137 TABLET ORAL
Qty: 90 TABLET | Refills: 0 | Status: SHIPPED | OUTPATIENT
Start: 2024-02-22 | End: 2024-02-22 | Stop reason: SDUPTHER

## 2024-02-22 RX ORDER — LOSARTAN POTASSIUM 50 MG/1
50 TABLET ORAL DAILY
Qty: 90 TABLET | Refills: 2 | Status: SHIPPED | OUTPATIENT
Start: 2024-02-22

## 2024-02-22 RX ORDER — LOSARTAN POTASSIUM 50 MG/1
50 TABLET ORAL DAILY
Qty: 90 TABLET | Refills: 0 | Status: SHIPPED | OUTPATIENT
Start: 2024-02-22 | End: 2024-02-22 | Stop reason: SDUPTHER

## 2024-02-22 NOTE — PROGRESS NOTES
Subjective:      Patient ID: Nisha Mancuso is a 48 y.o. female.    Chief Complaint: Follow-up    HPI      48 y.o. with  Patient Active Problem List   Diagnosis    MS (multiple sclerosis)    History of thyroid cancer    Postoperative hypothyroidism    Hypertension    DRE (stress urinary incontinence, female)    Status post laparoscopic hysterectomy    Routine general medical examination at a health care facility     Past Medical History:   Diagnosis Date    Hypertension     Multiple sclerosis     Thyroid cancer     Thyroid cancer 2011               Past Surgical History:   Procedure Laterality Date    COLONOSCOPY N/A 10/14/2022    Procedure: COLONOSCOPY;  Surgeon: Geetha Alonso MD;  Location: [facility];  Service: Endoscopy;  Laterality: N/A;    EXCISION, MASS N/A 02/10/2023    Procedure: EXCISION, MASS;  Surgeon: ERIK Rodarte MD;  Location: [facility] OR;  Service: OB/GYN;  Laterality: N/A;    HYSTERECTOMY  02/2023    THYROIDECTOMY      XI ROBOTIC HYSTERECTOMY, WITH SALPINGECTOMY N/A 02/10/2023    Procedure: XI ROBOTIC HYSTERECTOMY,WITH SALPINGECTOMY;  Surgeon: ERIK Rodarte MD;  Location: [facility] OR;  Service: OB/GYN;  Laterality: N/A;     Social History     Socioeconomic History    Marital status:    Tobacco Use    Smoking status: Never    Smokeless tobacco: Never   Substance and Sexual Activity    Alcohol use: Yes     Comment: socially    Drug use: Never    Sexual activity: Yes     family history includes Arthritis in her mother; Heart defect in her maternal grandfather; Hypertension in her father and mother; No Known Problems in her brother, maternal grandmother, paternal grandfather, paternal grandmother, sister, and sister.  Review of Systems   Constitutional:  Negative for chills and fever.   HENT:  Negative for ear pain and sore throat.    Respiratory:  Negative for cough.    Cardiovascular:  Negative for chest pain.   Gastrointestinal:  Negative for abdominal pain and blood in stool.  "  Genitourinary:  Negative for dysuria and hematuria.   Neurological:  Negative for seizures and syncope.     Objective:   /70 (BP Location: Right arm, Patient Position: Sitting, BP Method: Large (Manual))   Pulse 93   Temp 97.5 °F (36.4 °C) (Tympanic)   Ht 5' 5" (1.651 m)   Wt 76.1 kg (167 lb 12.3 oz)   LMP  (LMP Unknown)   SpO2 98%   BMI 27.92 kg/m²     Physical Exam  Constitutional:       General: She is awake. She is not in acute distress.     Appearance: Normal appearance. She is well-developed.   HENT:      Head: Normocephalic and atraumatic.   Eyes:      Extraocular Movements: Extraocular movements intact.      Conjunctiva/sclera: Conjunctivae normal.   Neck:      Thyroid: No thyromegaly.   Cardiovascular:      Rate and Rhythm: Normal rate and regular rhythm.   Pulmonary:      Effort: Pulmonary effort is normal.      Breath sounds: Normal breath sounds. No wheezing or rales.   Musculoskeletal:         General: No swelling.      Cervical back: Normal range of motion and neck supple. No rigidity.   Lymphadenopathy:      Cervical: No cervical adenopathy.   Skin:     General: Skin is warm and dry.   Neurological:      Mental Status: She is alert and oriented to person, place, and time. Mental status is at baseline.   Psychiatric:         Mood and Affect: Mood normal.         Behavior: Behavior normal. Behavior is cooperative.         Thought Content: Thought content normal.         Judgment: Judgment normal.         Lab Results   Component Value Date    WBC 6.11 02/03/2024    HGB 12.5 02/03/2024    HGB 11.9 (L) 09/23/2023    HGB 10.9 (L) 05/25/2023    HCT 39.7 02/03/2024    MCV 91 02/03/2024    MCV 86 09/23/2023    MCV 85 05/25/2023     02/03/2024    CHOL 247 (H) 05/25/2023    TRIG 104 05/25/2023    HDL 65 05/25/2023    LDLCALC 161.2 (H) 05/25/2023    LDLCALC 123.2 05/24/2022    LDLCALC 133.0 10/21/2019    ALT 13 02/03/2024    AST 17 02/03/2024     02/03/2024    K 4.2 02/03/2024    " CALCIUM 9.2 02/03/2024     02/03/2024    CO2 24 02/03/2024    BUN 14 02/03/2024    CREATININE 0.8 02/03/2024    CREATININE 0.8 09/23/2023    CREATININE 0.8 05/25/2023    EGFRNORACEVR >60.0 02/03/2024    EGFRNORACEVR >60.0 09/23/2023    EGFRNORACEVR >60.0 05/25/2023    TSH 1.86 09/25/2023    TSH 12.97 (H) 08/08/2022    TSH 0.830 05/24/2022    GLU 73 02/03/2024    HGBA1C 5.0 05/25/2023    HGBA1C 4.9 05/24/2022    HGBA1C 5.2 10/21/2019    [id number] 29 (L) 09/23/2023          The 10-year ASCVD risk score (Yuniel VELIZ, et al., 2019) is: 1.6%    Values used to calculate the score:      Age: 48 years      Sex: Female      Is Non- : No      Diabetic: No      Tobacco smoker: No      Systolic Blood Pressure: 120 mmHg      Is BP treated: Yes      HDL Cholesterol: 65 mg/dL      Total Cholesterol: 247 mg/dL     Assessment:     1. MS (multiple sclerosis)    2. Hypertension, unspecified type    3. History of thyroid cancer    4. Preventative health care    5. Postoperative hypothyroidism      Plan:   1. MS (multiple sclerosis)  Overview:  Dx 2012      2. Hypertension, unspecified type  Overview:  Controlled. Cont current meds.       Assessment & Plan:  Controlled. Cont current meds.   Reports home bp doing well. Systolic 120s.       Orders:  -     Discontinue: losartan (COZAAR) 50 MG tablet; Take 1 tablet (50 mg total) by mouth once daily.  Dispense: 90 tablet; Refill: 0  -     losartan (COZAAR) 50 MG tablet; Take 1 tablet (50 mg total) by mouth once daily.  Dispense: 90 tablet; Refill: 2    3. History of thyroid cancer    4. Preventative health care  -     Lipid Panel; Future; Expected date: 08/20/2024  -     Hemoglobin A1C; Future; Expected date: 08/20/2024  -     Comprehensive Metabolic Panel; Future; Expected date: 08/20/2024  -     CBC Auto Differential; Future; Expected date: 08/20/2024  -     TSH; Future; Expected date: 08/20/2024    5. Postoperative hypothyroidism  -     Discontinue:  levothyroxine (SYNTHROID) 137 MCG Tab tablet; Take 1 tablet (137 mcg total) by mouth before breakfast.  Dispense: 90 tablet; Refill: 0  -     levothyroxine (SYNTHROID) 137 MCG Tab tablet; Take 1 tablet (137 mcg total) by mouth before breakfast.  Dispense: 90 tablet; Refill: 2        Patient Instructions   Check with your pharmacy regarding flu, tetanus, shingles vaccine.      Future Appointments   Date Time Provider Department Center   8/20/2024  7:30 [facility]         Lab Frequency Next Occurrence   MRI Knee Without Contrast Right Once 04/18/2023       Follow up in about 6 months (around 8/22/2024), or if symptoms worsen or fail to improve.

## 2024-03-19 ENCOUNTER — TELEPHONE (OUTPATIENT)
Dept: INTERNAL MEDICINE | Facility: CLINIC | Age: 49
End: 2024-03-19
Payer: COMMERCIAL

## 2024-03-19 ENCOUNTER — PATIENT MESSAGE (OUTPATIENT)
Dept: INTERNAL MEDICINE | Facility: CLINIC | Age: 49
End: 2024-03-19
Payer: COMMERCIAL

## 2024-03-19 NOTE — TELEPHONE ENCOUNTER
Spoke with patient and she stated that she has had some chest discomfort since Sunday. She denies any other symptoms and stated that it could be anxiety. Advised her to seek care immediately for chest pain. She verbalized understanding.

## 2024-03-27 ENCOUNTER — HOSPITAL ENCOUNTER (OUTPATIENT)
Dept: RADIOLOGY | Facility: HOSPITAL | Age: 49
Discharge: HOME | End: 2024-03-27
Attending: INTERNAL MEDICINE
Payer: COMMERCIAL

## 2024-03-27 ENCOUNTER — OFFICE VISIT (OUTPATIENT)
Dept: INTERNAL MEDICINE | Facility: CLINIC | Age: 49
End: 2024-03-27
Payer: COMMERCIAL

## 2024-03-27 ENCOUNTER — HOSPITAL ENCOUNTER (OUTPATIENT)
Dept: CARDIOLOGY | Facility: HOSPITAL | Age: 49
Discharge: HOME | End: 2024-03-27
Attending: INTERNAL MEDICINE
Payer: COMMERCIAL

## 2024-03-27 VITALS
HEART RATE: 85 BPM | OXYGEN SATURATION: 97 % | TEMPERATURE: 97 F | HEIGHT: 65 IN | BODY MASS INDEX: 27.95 KG/M2 | DIASTOLIC BLOOD PRESSURE: 86 MMHG | WEIGHT: 167.75 LBS | SYSTOLIC BLOOD PRESSURE: 125 MMHG

## 2024-03-27 DIAGNOSIS — R07.9 CHEST PAIN, UNSPECIFIED TYPE: ICD-10-CM

## 2024-03-27 DIAGNOSIS — R07.9 CHEST PAIN, UNSPECIFIED TYPE: Primary | ICD-10-CM

## 2024-03-27 LAB
OHS QRS DURATION: 96 MS
OHS QTC CALCULATION: 417 MS

## 2024-03-27 PROCEDURE — 93010 ELECTROCARDIOGRAM REPORT: CPT | Mod: ,,, | Performed by: INTERNAL MEDICINE

## 2024-03-27 PROCEDURE — 99215 OFFICE O/P EST HI 40 MIN: CPT | Mod: S$GLB,,, | Performed by: INTERNAL MEDICINE

## 2024-03-27 PROCEDURE — 71046 X-RAY EXAM CHEST 2 VIEWS: CPT | Mod: 26,,, | Performed by: RADIOLOGY

## 2024-03-27 PROCEDURE — 3008F BODY MASS INDEX DOCD: CPT | Mod: CPTII,S$GLB,, | Performed by: INTERNAL MEDICINE

## 2024-03-27 PROCEDURE — 1159F MED LIST DOCD IN RCRD: CPT | Mod: CPTII,S$GLB,, | Performed by: INTERNAL MEDICINE

## 2024-03-27 PROCEDURE — 93005 ELECTROCARDIOGRAM TRACING: CPT

## 2024-03-27 PROCEDURE — 71046 X-RAY EXAM CHEST 2 VIEWS: CPT | Mod: TC

## 2024-03-27 PROCEDURE — 99999 PR PBB SHADOW E&M-EST. PATIENT-LVL V: CPT | Mod: PBBFAC,,, | Performed by: INTERNAL MEDICINE

## 2024-03-27 PROCEDURE — 3074F SYST BP LT 130 MM HG: CPT | Mod: CPTII,S$GLB,, | Performed by: INTERNAL MEDICINE

## 2024-03-27 PROCEDURE — 3079F DIAST BP 80-89 MM HG: CPT | Mod: CPTII,S$GLB,, | Performed by: INTERNAL MEDICINE

## 2024-03-27 PROCEDURE — 4010F ACE/ARB THERAPY RXD/TAKEN: CPT | Mod: CPTII,S$GLB,, | Performed by: INTERNAL MEDICINE

## 2024-03-27 RX ORDER — NAPROXEN 500 MG/1
500 TABLET ORAL 2 TIMES DAILY WITH MEALS
Qty: 15 TABLET | Refills: 0 | Status: SHIPPED | OUTPATIENT
Start: 2024-03-27

## 2024-03-27 NOTE — PROGRESS NOTES
Subjective:      Patient ID: Nisha Mancuso is a 48 y.o. female.    Chief Complaint: Chest Pain    HPI    49 yo with   Patient Active Problem List   Diagnosis    MS (multiple sclerosis)    History of thyroid cancer    Postoperative hypothyroidism    Hypertension    RDE (stress urinary incontinence, female)    Status post laparoscopic hysterectomy    Routine general medical examination at a health care facility     Past Medical History:   Diagnosis Date    Hypertension     Multiple sclerosis     Thyroid cancer     Thyroid cancer 2011     Pt reports chest pain and dyspnea starting the day she was informed of her fathers death in December. Pressure like.   Spontaneously resolved in minutes. No assoc diaphoresis/n/v/radiaton.     Mid February similar episode. But less severe. At work. Pressure/tightness. No dyspnea. Lasted seconds. No assoc diaphoresis/n/v/radiaton.     2 weeks ago similar pressure pain. At home on a Sunday while getting our of bed.  No assoc dyspnea. Lasted hours. No assoc diaphoresis/n/v/radiaton. Does not recall stressfull situation.     Pain yesterday while at work while typing with dyspnea. She was typing at the time. Mild constant left sided chest pain/pressure. Declined ER visit. Worse with deep breaths.  Pain to left upper back with deep breaths.  No dyspnea today. No exertional symptoms. No pain or dyspnea walking 40 min for exercise yesterday.   No assoc diaphoresis/n/v/radiaton.     Denies trauma.    Pt does has stress related to work.      Review of Systems   Constitutional:  Negative for activity change, chills, diaphoresis, fatigue, fever and unexpected weight change.   HENT:  Negative for hearing loss, rhinorrhea and trouble swallowing.    Eyes:  Negative for discharge and visual disturbance.   Respiratory:  Positive for chest tightness. Negative for cough, shortness of breath and wheezing.    Cardiovascular:  Positive for chest pain. Negative for palpitations.   Gastrointestinal:  Negative  "for blood in stool, constipation, diarrhea and vomiting.   Endocrine: Negative for polydipsia.   Genitourinary:  Negative for dysuria, hematuria and menstrual problem.   Musculoskeletal:  Negative for arthralgias and joint swelling.   Skin:  Negative for rash and wound.   Neurological:  Negative for weakness and headaches.   Psychiatric/Behavioral:  Negative for dysphoric mood.      Objective:   /86   Pulse 85   Temp 97.3 °F (36.3 °C) (Tympanic)   Ht 5' 5" (1.651 m)   Wt 76.1 kg (167 lb 12.3 oz)   LMP  (LMP Unknown)   SpO2 97%   BMI 27.92 kg/m²     Physical Exam  Constitutional:       General: She is not in acute distress.     Appearance: She is well-developed.   HENT:      Head: Normocephalic and atraumatic.   Eyes:      Extraocular Movements: Extraocular movements intact.   Neck:      Thyroid: No thyromegaly.   Cardiovascular:      Rate and Rhythm: Normal rate and regular rhythm.   Pulmonary:      Breath sounds: Normal breath sounds. No wheezing or rales.   Abdominal:      General: Bowel sounds are normal.      Palpations: Abdomen is soft.      Tenderness: There is no abdominal tenderness.   Musculoskeletal:         General: No swelling or tenderness.      Cervical back: Neck supple. No rigidity.      Right lower leg: No edema.      Left lower leg: No edema.   Lymphadenopathy:      Cervical: No cervical adenopathy.   Skin:     General: Skin is warm and dry.   Neurological:      Mental Status: She is alert and oriented to person, place, and time.   Psychiatric:         Behavior: Behavior normal.       Pain to left upper back reproduced with twisting motions.  Chest pain and left upper back pain reproduced with deep breaths.    Lab Results   Component Value Date    WBC 6.11 02/03/2024    HGB 12.5 02/03/2024    HGB 11.9 (L) 09/23/2023    HGB 10.9 (L) 05/25/2023    HCT 39.7 02/03/2024    MCV 91 02/03/2024    MCV 86 09/23/2023    MCV 85 05/25/2023     02/03/2024    CHOL 247 (H) 05/25/2023    TRIG " 104 05/25/2023    HDL 65 05/25/2023    LDLCALC 161.2 (H) 05/25/2023    LDLCALC 123.2 05/24/2022    LDLCALC 133.0 10/21/2019    ALT 13 02/03/2024    AST 17 02/03/2024     02/03/2024    K 4.2 02/03/2024    CALCIUM 9.2 02/03/2024     02/03/2024    CO2 24 02/03/2024    BUN 14 02/03/2024    CREATININE 0.8 02/03/2024    CREATININE 0.8 09/23/2023    CREATININE 0.8 05/25/2023    EGFRNORACEVR >60.0 02/03/2024    EGFRNORACEVR >60.0 09/23/2023    EGFRNORACEVR >60.0 05/25/2023    TSH 1.86 09/25/2023    TSH 12.97 (H) 08/08/2022    TSH 0.830 05/24/2022    GLU 73 02/03/2024    HGBA1C 5.0 05/25/2023    HGBA1C 4.9 05/24/2022    HGBA1C 5.2 10/21/2019    [id number] 29 (L) 09/23/2023          The 10-year ASCVD risk score (Yuniel VELIZ, et al., 2019) is: 1.7%    Values used to calculate the score:      Age: 48 years      Sex: Female      Is Non- : No      Diabetic: No      Tobacco smoker: No      Systolic Blood Pressure: 125 mmHg      Is BP treated: Yes      HDL Cholesterol: 65 mg/dL      Total Cholesterol: 247 mg/dL     Assessment:     1. Chest pain, unspecified type      Plan:   1. Chest pain, unspecified type  -     Ambulatory referral/consult to Cardiology; Future; Expected date: 04/03/2024  -     CBC Auto Differential; Future; Expected date: 03/27/2024  -     Comprehensive Metabolic Panel; Future; Expected date: 03/27/2024  -     X-Ray Chest PA And Lateral; Future; Expected date: 03/27/2024  -     EKG 12-lead; Future; Expected date: 03/27/2024  -     Cancel: TROPONIN I; Future; Expected date: 03/27/2024  -     naproxen (NAPROSYN) 500 MG tablet; Take 1 tablet (500 mg total) by mouth 2 (two) times daily with meals. For pain and inflammation  Dispense: 15 tablet; Refill: 0  -     TROPONIN I; Future; Expected date: 03/27/2024    Patient with mostly atypical features.  Workup as above.  Treat for musculoskeletal and pleurisy etiologies.    Cards for further cardiac evaluation/opinion.    40+ minutes of  total time spent on the encounter, which includes face to face time and non-face to face time preparing to see the patient (eg, review of tests), Obtaining and/or reviewing separately obtained history, documenting clinical information in the electronic or other health record, independently interpreting results (not separately reported) and communicating results to the patient/family/caregiver, or Care coordination (not separately reported).       Patient Instructions   Take an extra 20mg baclofen at bedtime for 3 nights.    No ibuprofen, aleve, motrin, naprosyn, advil, or other NSAIDs while taking naproxen.    Tylenol 500 to 1000 mg every 8 hours as needed for pain.      Future Appointments   Date Time Provider Department Center   3/27/2024 10:15 AM [facility] XR2 [facility]   3/27/2024 11:10 AM EKG, [facility] SPECCPR [facility]   3/27/2024 12:25 PM LABORATORY, [facility] LAB [facility]   8/15/2024  7:35 AM LABORATORY, [facility] LAB [facility]   8/22/2024  8:40 AM Kit Apple MD [facility] IM [place]       Lab Frequency Next Occurrence   MRI Knee Without Contrast Right Once 04/18/2023   Lipid Panel Once 08/20/2024   Hemoglobin A1C Once 08/20/2024   Comprehensive Metabolic Panel Once 08/20/2024   CBC Auto Differential Once 08/20/2024   TSH Once 08/20/2024       Follow up if symptoms worsen or fail to improve.

## 2024-03-27 NOTE — PATIENT INSTRUCTIONS
Take an extra 20mg baclofen at bedtime for 3 nights.    No ibuprofen, aleve, motrin, naprosyn, advil, or other NSAIDs while taking naproxen.    Tylenol 500 to 1000 mg every 8 hours as needed for pain.

## 2024-05-02 ENCOUNTER — PATIENT MESSAGE (OUTPATIENT)
Dept: PSYCHIATRY | Facility: CLINIC | Age: 49
End: 2024-05-02
Payer: COMMERCIAL

## 2024-05-27 PROBLEM — Z00.00 ROUTINE GENERAL MEDICAL EXAMINATION AT A HEALTH CARE FACILITY: Status: RESOLVED | Noted: 2023-05-31 | Resolved: 2024-05-27
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
## 2024-07-17 DIAGNOSIS — Z12.31 OTHER SCREENING MAMMOGRAM: ICD-10-CM

## 2024-07-25 ENCOUNTER — PATIENT MESSAGE (OUTPATIENT)
Dept: PSYCHIATRY | Facility: CLINIC | Age: 49
End: 2024-07-25
Payer: COMMERCIAL

## 2024-08-05 ENCOUNTER — PATIENT MESSAGE (OUTPATIENT)
Dept: PSYCHIATRY | Facility: CLINIC | Age: 49
End: 2024-08-05
Payer: COMMERCIAL

## 2024-08-17 DIAGNOSIS — I10 HYPERTENSION, UNSPECIFIED TYPE: ICD-10-CM

## 2024-08-17 NOTE — TELEPHONE ENCOUNTER
Care Due:                  Date            Visit Type   Department     Provider  --------------------------------------------------------------------------------                                MYCHART                              FOLLOWUP/OF  [facility] INTERNAL  Last Visit: 03-      [facility] VISIT   MEDICINE       Kit Apple                              EP -                              PRIMARY      [facility]  Next Visit: 08-      CARE ([facility])   MEDICINE       Kit Apple                                                            Last  Test          Frequency    Reason                     Performed    Due Date  --------------------------------------------------------------------------------    TSH.........  12 months..  levothyroxine............  Not Found    Overdue    [employer] Embedded Care Due Messages. Reference number: [id number].   8/17/2024 12:42:40 PM CDT

## 2024-08-18 RX ORDER — LOSARTAN POTASSIUM 50 MG/1
50 TABLET ORAL
Qty: 90 TABLET | Refills: 2 | Status: SHIPPED | OUTPATIENT
Start: 2024-08-18 | End: 2024-08-22 | Stop reason: SDUPTHER

## 2024-08-18 NOTE — TELEPHONE ENCOUNTER
Provider Staff:  Action required for this patient     Please see care gap opportunities below in Care Due Message.    Thanks!  [facility]     Appointments      Date Provider   Last Visit   3/27/2024 Kit Apple MD   Next Visit   8/22/2024 Kit Apple MD      Refill Decision Note   Nisha Mancuso  is requesting a refill authorization.  Brief Assessment and Rationale for Refill:  Approve     Medication Therapy Plan:         Comments:     Note composed:2:39 AM 08/18/2024

## 2024-08-22 ENCOUNTER — OFFICE VISIT (OUTPATIENT)
Dept: INTERNAL MEDICINE | Facility: CLINIC | Age: 49
End: 2024-08-22
Payer: COMMERCIAL

## 2024-08-22 VITALS
WEIGHT: 166 LBS | HEIGHT: 65 IN | HEART RATE: 100 BPM | TEMPERATURE: 98 F | DIASTOLIC BLOOD PRESSURE: 78 MMHG | SYSTOLIC BLOOD PRESSURE: 132 MMHG | OXYGEN SATURATION: 95 % | BODY MASS INDEX: 27.66 KG/M2

## 2024-08-22 DIAGNOSIS — G35 MS (MULTIPLE SCLEROSIS): ICD-10-CM

## 2024-08-22 DIAGNOSIS — I10 HYPERTENSION, UNSPECIFIED TYPE: ICD-10-CM

## 2024-08-22 DIAGNOSIS — Z00.00 ROUTINE GENERAL MEDICAL EXAMINATION AT A HEALTH CARE FACILITY: Primary | ICD-10-CM

## 2024-08-22 DIAGNOSIS — E89.0 POSTOPERATIVE HYPOTHYROIDISM: ICD-10-CM

## 2024-08-22 PROCEDURE — 99396 PREV VISIT EST AGE 40-64: CPT | Mod: S$GLB,,, | Performed by: INTERNAL MEDICINE

## 2024-08-22 PROCEDURE — 3075F SYST BP GE 130 - 139MM HG: CPT | Mod: CPTII,S$GLB,, | Performed by: INTERNAL MEDICINE

## 2024-08-22 PROCEDURE — 99999 PR PBB SHADOW E&M-EST. PATIENT-LVL IV: CPT | Mod: PBBFAC,,, | Performed by: INTERNAL MEDICINE

## 2024-08-22 PROCEDURE — 1159F MED LIST DOCD IN RCRD: CPT | Mod: CPTII,S$GLB,, | Performed by: INTERNAL MEDICINE

## 2024-08-22 PROCEDURE — 3008F BODY MASS INDEX DOCD: CPT | Mod: CPTII,S$GLB,, | Performed by: INTERNAL MEDICINE

## 2024-08-22 PROCEDURE — 3078F DIAST BP <80 MM HG: CPT | Mod: CPTII,S$GLB,, | Performed by: INTERNAL MEDICINE

## 2024-08-22 PROCEDURE — 4010F ACE/ARB THERAPY RXD/TAKEN: CPT | Mod: CPTII,S$GLB,, | Performed by: INTERNAL MEDICINE

## 2024-08-22 RX ORDER — CHOLECALCIFEROL (VITAMIN D3) 125 MCG
1 CAPSULE ORAL
COMMUNITY

## 2024-08-22 RX ORDER — LOSARTAN POTASSIUM 50 MG/1
50 TABLET ORAL DAILY
Qty: 90 TABLET | Refills: 3 | Status: SHIPPED | OUTPATIENT
Start: 2024-08-22 | End: 2024-08-22 | Stop reason: SDUPTHER

## 2024-08-22 RX ORDER — LEVOTHYROXINE SODIUM 137 UG/1
137 TABLET ORAL
Qty: 90 TABLET | Refills: 3 | Status: SHIPPED | OUTPATIENT
Start: 2024-08-22

## 2024-08-22 RX ORDER — LEVOTHYROXINE SODIUM 137 UG/1
137 TABLET ORAL
Qty: 90 TABLET | Refills: 3 | Status: SHIPPED | OUTPATIENT
Start: 2024-08-22 | End: 2024-08-22 | Stop reason: SDUPTHER

## 2024-08-22 RX ORDER — LOSARTAN POTASSIUM 50 MG/1
50 TABLET ORAL DAILY
Qty: 90 TABLET | Refills: 3 | Status: SHIPPED | OUTPATIENT
Start: 2024-08-22

## 2024-08-22 RX ORDER — LANOLIN ALCOHOL/MO/W.PET/CERES
100 CREAM (GRAM) TOPICAL
COMMUNITY
Start: 2024-02-09

## 2024-08-22 NOTE — PROGRESS NOTES
Subjective:      Patient ID: Nisha Mancuso is a 49 y.o. female.    Chief Complaint: Follow-up    Follow-up  Pertinent negatives include no abdominal pain, chest pain, chills, coughing, fever or sore throat.         49 y.o. with  Patient Active Problem List   Diagnosis    MS (multiple sclerosis)    History of thyroid cancer    Postoperative hypothyroidism    Hypertension    DRE (stress urinary incontinence, female)    Status post laparoscopic hysterectomy    Routine general medical examination at a health care facility     Past Medical History:   Diagnosis Date    Hypertension     Multiple sclerosis     Thyroid cancer     Thyroid cancer 2011       Here today for annual prev exam.  Compliant with meds without significant side effects. Energy and appetite are good.         Past Surgical History:   Procedure Laterality Date    COLONOSCOPY N/A 10/14/2022    Procedure: COLONOSCOPY;  Surgeon: Geetha Alonso MD;  Location: [facility];  Service: Endoscopy;  Laterality: N/A;    EXCISION, MASS N/A 02/10/2023    Procedure: EXCISION, MASS;  Surgeon: ERIK Rodarte MD;  Location: [facility] OR;  Service: OB/GYN;  Laterality: N/A;    HYSTERECTOMY  02/2023    THYROIDECTOMY      XI ROBOTIC HYSTERECTOMY, WITH SALPINGECTOMY N/A 02/10/2023    Procedure: XI ROBOTIC HYSTERECTOMY,WITH SALPINGECTOMY;  Surgeon: ERIK Rodarte MD;  Location: [facility] OR;  Service: OB/GYN;  Laterality: N/A;     Social History     Socioeconomic History    Marital status:    Tobacco Use    Smoking status: Never    Smokeless tobacco: Never   Substance and Sexual Activity    Alcohol use: Yes     Comment: socially    Drug use: Never    Sexual activity: Yes     Social Determinants of Health     Financial Resource Strain: High Risk (8/22/2024)    Overall Financial Resource Strain (CARDIA)     Difficulty of Paying Living Expenses: Hard   Food Insecurity: Food Insecurity Present (8/22/2024)    Hunger Vital Sign     Worried About Running Out of Food in the  "Last Year: Often true     Ran Out of Food in the Last Year: Sometimes true   Transportation Needs: No Transportation Needs (3/27/2024)    PRAPARE - Transportation     Lack of Transportation (Medical): No     Lack of Transportation (Non-Medical): No   Physical Activity: Insufficiently Active (8/22/2024)    Exercise Vital Sign     Days of Exercise per Week: 3 days     Minutes of Exercise per Session: 20 min   Stress: No Stress Concern Present (8/22/2024)    [employer] - Occupational Stress Questionnaire     Feeling of Stress : Only a little   Housing Stability: High Risk (8/22/2024)    Housing Stability Vital Sign     Unable to Pay for Housing in the Last Year: Yes     family history includes Arthritis in her mother; Heart defect in her maternal grandfather; Hypertension in her father and mother; No Known Problems in her brother, maternal grandmother, paternal grandfather, paternal grandmother, sister, and sister.  Review of Systems   Constitutional:  Negative for chills and fever.   HENT:  Negative for ear pain and sore throat.    Respiratory:  Negative for cough.    Cardiovascular:  Negative for chest pain.   Gastrointestinal:  Negative for abdominal pain and blood in stool.   Genitourinary:  Negative for dysuria and hematuria.   Neurological:  Negative for seizures and syncope.     Objective:   /78 (BP Location: Left arm, Patient Position: Sitting, BP Method: Medium (Manual))   Pulse 100   Temp 97.9 °F (36.6 °C) (Tympanic)   Ht 5' 5" (1.651 m)   Wt 75.3 kg (166 lb 0.1 oz)   LMP  (LMP Unknown)   SpO2 95%   BMI 27.62 kg/m²     Physical Exam  Constitutional:       General: She is not in acute distress.     Appearance: She is well-developed.   HENT:      Head: Normocephalic and atraumatic.   Eyes:      Extraocular Movements: Extraocular movements intact.   Neck:      Thyroid: No thyromegaly.   Cardiovascular:      Rate and Rhythm: Normal rate and regular rhythm.   Pulmonary: "      Breath sounds: Normal breath sounds. No wheezing or rales.   Abdominal:      General: Bowel sounds are normal.      Palpations: Abdomen is soft.      Tenderness: There is no abdominal tenderness.   Musculoskeletal:         General: No swelling.      Cervical back: Neck supple. No rigidity.   Lymphadenopathy:      Cervical: No cervical adenopathy.   Skin:     General: Skin is warm and dry.   Neurological:      Mental Status: She is alert and oriented to person, place, and time.   Psychiatric:         Behavior: Behavior normal.         Lab Results   Component Value Date    WBC 5.98 03/27/2024    HGB 13.5 03/27/2024    HGB 12.5 02/03/2024    HGB 11.9 (L) 09/23/2023    HCT 41.0 03/27/2024    MCV 89 03/27/2024    MCV 91 02/03/2024    MCV 86 09/23/2023     03/27/2024    CHOL 247 (H) 05/25/2023    TRIG 104 05/25/2023    HDL 65 05/25/2023    LDLCALC 161.2 (H) 05/25/2023    LDLCALC 123.2 05/24/2022    LDLCALC 133.0 10/21/2019    ALT 16 03/27/2024    AST 18 03/27/2024     03/27/2024    K 4.5 03/27/2024    CALCIUM 9.7 03/27/2024     03/27/2024    CO2 29 03/27/2024    BUN 15 03/27/2024    CREATININE 0.8 03/27/2024    CREATININE 0.8 02/03/2024    CREATININE 0.8 09/23/2023    EGFRNORACEVR >60 03/27/2024    EGFRNORACEVR >60.0 02/03/2024    EGFRNORACEVR >60.0 09/23/2023    TSH 1.86 09/25/2023    TSH 12.97 (H) 08/08/2022    TSH 0.830 05/24/2022    GLU 90 03/27/2024    HGBA1C 5.0 05/25/2023    HGBA1C 4.9 05/24/2022    HGBA1C 5.2 10/21/2019    [id number] 29 (L) 09/23/2023          The 10-year ASCVD risk score (Yuniel VELIZ, et al., 2019) is: 2.1%    Values used to calculate the score:      Age: 49 years      Sex: Female      Is Non- : No      Diabetic: No      Tobacco smoker: No      Systolic Blood Pressure: 132 mmHg      Is BP treated: Yes      HDL Cholesterol: 65 mg/dL      Total Cholesterol: 247 mg/dL     Assessment:     1. Routine general medical examination at a health care facility     2. Hypertension, unspecified type    3. MS (multiple sclerosis)    4. Postoperative hypothyroidism      Plan:   1. Routine general medical examination at a health care facility  Overview:  Reports tdap in 2014  Heart healthy diet, regular exercise, and regular use of sunscreen.    reviewed    Orders:  -     CBC Auto Differential; Future; Expected date: 08/22/2025  -     Comprehensive Metabolic Panel; Future; Expected date: 08/22/2025  -     Hemoglobin A1C; Future; Expected date: 08/22/2025  -     Lipid Panel; Future; Expected date: 08/22/2025  -     TSH; Future; Expected date: 08/22/2025    2. Hypertension, unspecified type  Overview:  Controlled. Cont current meds.       Orders:  -     Discontinue: losartan (COZAAR) 50 MG tablet; Take 1 tablet (50 mg total) by mouth once daily.  Dispense: 90 tablet; Refill: 3  -     losartan (COZAAR) 50 MG tablet; Take 1 tablet (50 mg total) by mouth once daily.  Dispense: 90 tablet; Refill: 3    3. MS (multiple sclerosis)  Overview:  Dx 2012      4. Postoperative hypothyroidism  -     Discontinue: levothyroxine (SYNTHROID) 137 MCG Tab tablet; Take 1 tablet (137 mcg total) by mouth before breakfast.  Dispense: 90 tablet; Refill: 3  -     levothyroxine (SYNTHROID) 137 MCG Tab tablet; Take 1 tablet (137 mcg total) by mouth before breakfast.  Dispense: 90 tablet; Refill: 3        Patient Instructions   Check with your pharmacy regarding new shingles vaccine.      Future Appointments   Date Time Provider Department Center   8/24/2024  9:30 AM LABORATORY, [facility]   8/22/2025  8:00 AM Kit Apple MD [facility]       Lab Frequency Next Occurrence   Lipid Panel Once 08/20/2024   Hemoglobin A1C Once 08/20/2024   Comprehensive Metabolic Panel Once 08/20/2024   CBC Auto Differential Once 08/20/2024   TSH Once 08/20/2024   Ambulatory referral/consult to Cardiology Once 04/03/2024   Mammo Digital Screening Bilat w/ Tony Once 07/17/2024       Follow up in about  1 year (around 8/22/2025), or if symptoms worsen or fail to improve, for fasting labs saturday. .

## 2024-09-21 ENCOUNTER — LAB VISIT (OUTPATIENT)
Dept: LAB | Facility: HOSPITAL | Age: 49
End: 2024-09-21
Attending: INTERNAL MEDICINE
Payer: COMMERCIAL

## 2024-09-21 DIAGNOSIS — Z00.00 ROUTINE GENERAL MEDICAL EXAMINATION AT A HEALTH CARE FACILITY: ICD-10-CM

## 2024-09-21 DIAGNOSIS — Z00.00 PREVENTATIVE HEALTH CARE: ICD-10-CM

## 2024-09-21 DIAGNOSIS — I10 HYPERTENSION, UNSPECIFIED TYPE: ICD-10-CM

## 2024-09-21 DIAGNOSIS — E89.0 POSTOPERATIVE HYPOTHYROIDISM: ICD-10-CM

## 2024-09-21 LAB
ALBUMIN SERPL BCP-MCNC: 3.8 G/DL (ref 3.5–5.2)
ALP SERPL-CCNC: 67 U/L (ref 55–135)
ALT SERPL W/O P-5'-P-CCNC: 14 U/L (ref 10–44)
ANION GAP SERPL CALC-SCNC: 7 MMOL/L (ref 8–16)
AST SERPL-CCNC: 18 U/L (ref 10–40)
BASOPHILS # BLD AUTO: 0.03 K/UL (ref 0–0.2)
BASOPHILS NFR BLD: 0.5 % (ref 0–1.9)
BILIRUB SERPL-MCNC: 0.5 MG/DL (ref 0.1–1)
BUN SERPL-MCNC: 11 MG/DL (ref 6–20)
CALCIUM SERPL-MCNC: 9.5 MG/DL (ref 8.7–10.5)
CHLORIDE SERPL-SCNC: 110 MMOL/L (ref 95–110)
CHOLEST SERPL-MCNC: 233 MG/DL (ref 120–199)
CHOLEST/HDLC SERPL: 3.6 {RATIO} (ref 2–5)
CO2 SERPL-SCNC: 26 MMOL/L (ref 23–29)
CREAT SERPL-MCNC: 0.8 MG/DL (ref 0.5–1.4)
DIFFERENTIAL METHOD BLD: ABNORMAL
EOSINOPHIL # BLD AUTO: 0.2 K/UL (ref 0–0.5)
EOSINOPHIL NFR BLD: 4.2 % (ref 0–8)
ERYTHROCYTE [DISTWIDTH] IN BLOOD BY AUTOMATED COUNT: 13.8 % (ref 11.5–14.5)
EST. GFR  (NO RACE VARIABLE): >60 ML/MIN/1.73 M^2
ESTIMATED AVG GLUCOSE: 91 MG/DL (ref 68–131)
GLUCOSE SERPL-MCNC: 81 MG/DL (ref 70–110)
HBA1C MFR BLD: 4.8 % (ref 4–5.6)
HCT VFR BLD AUTO: 38.3 % (ref 37–48.5)
HDLC SERPL-MCNC: 64 MG/DL (ref 40–75)
HDLC SERPL: 27.5 % (ref 20–50)
HGB BLD-MCNC: 12 G/DL (ref 12–16)
IMM GRANULOCYTES # BLD AUTO: 0.01 K/UL (ref 0–0.04)
IMM GRANULOCYTES NFR BLD AUTO: 0.2 % (ref 0–0.5)
LDLC SERPL CALC-MCNC: 150.2 MG/DL (ref 63–159)
LYMPHOCYTES # BLD AUTO: 1.8 K/UL (ref 1–4.8)
LYMPHOCYTES NFR BLD: 32.5 % (ref 18–48)
MCH RBC QN AUTO: 29.1 PG (ref 27–31)
MCHC RBC AUTO-ENTMCNC: 31.3 G/DL (ref 32–36)
MCV RBC AUTO: 93 FL (ref 82–98)
MONOCYTES # BLD AUTO: 0.5 K/UL (ref 0.3–1)
MONOCYTES NFR BLD: 8.1 % (ref 4–15)
NEUTROPHILS # BLD AUTO: 3 K/UL (ref 1.8–7.7)
NEUTROPHILS NFR BLD: 54.5 % (ref 38–73)
NONHDLC SERPL-MCNC: 169 MG/DL
NRBC BLD-RTO: 0 /100 WBC
PLATELET # BLD AUTO: 339 K/UL (ref 150–450)
PMV BLD AUTO: 10.3 FL (ref 9.2–12.9)
POTASSIUM SERPL-SCNC: 4.3 MMOL/L (ref 3.5–5.1)
PROT SERPL-MCNC: 7.5 G/DL (ref 6–8.4)
RBC # BLD AUTO: 4.13 M/UL (ref 4–5.4)
SODIUM SERPL-SCNC: 143 MMOL/L (ref 136–145)
TRIGL SERPL-MCNC: 94 MG/DL (ref 30–150)
TSH SERPL DL<=0.005 MIU/L-ACNC: 0.61 UIU/ML (ref 0.4–4)
TSH SERPL DL<=0.005 MIU/L-ACNC: 0.61 UIU/ML (ref 0.4–4)
WBC # BLD AUTO: 5.53 K/UL (ref 3.9–12.7)

## 2024-09-21 PROCEDURE — 85025 COMPLETE CBC W/AUTO DIFF WBC: CPT | Performed by: INTERNAL MEDICINE

## 2024-09-21 PROCEDURE — 80061 LIPID PANEL: CPT | Performed by: INTERNAL MEDICINE

## 2024-09-21 PROCEDURE — 36415 COLL VENOUS BLD VENIPUNCTURE: CPT | Performed by: INTERNAL MEDICINE

## 2024-09-21 PROCEDURE — 80053 COMPREHEN METABOLIC PANEL: CPT | Performed by: INTERNAL MEDICINE

## 2024-09-21 PROCEDURE — 83036 HEMOGLOBIN GLYCOSYLATED A1C: CPT | Performed by: INTERNAL MEDICINE

## 2024-09-21 PROCEDURE — 84443 ASSAY THYROID STIM HORMONE: CPT | Performed by: INTERNAL MEDICINE

## 2024-09-21 RX ORDER — LOSARTAN POTASSIUM 50 MG/1
50 TABLET ORAL DAILY
Qty: 90 TABLET | Refills: 3 | Status: CANCELLED | OUTPATIENT
Start: 2024-09-21

## 2024-09-21 RX ORDER — LEVOTHYROXINE SODIUM 137 UG/1
137 TABLET ORAL
Qty: 90 TABLET | Refills: 3 | Status: CANCELLED | OUTPATIENT
Start: 2024-09-21

## 2024-09-21 NOTE — TELEPHONE ENCOUNTER
No care due was identified.  [facility] Embedded Care Due Messages. Reference number: [id number].   9/21/2024 4:01:55 PM CDT

## 2024-11-21 DIAGNOSIS — G56.01 CARPAL TUNNEL SYNDROME OF RIGHT WRIST: Primary | ICD-10-CM

## 2024-12-14 ENCOUNTER — LAB VISIT (OUTPATIENT)
Dept: LAB | Facility: HOSPITAL | Age: 49
End: 2024-12-14
Payer: COMMERCIAL

## 2024-12-14 DIAGNOSIS — G56.01 CARPAL TUNNEL SYNDROME ON RIGHT: ICD-10-CM

## 2024-12-14 LAB
ALBUMIN SERPL BCP-MCNC: 3.9 G/DL (ref 3.5–5.2)
ALP SERPL-CCNC: 70 U/L (ref 40–150)
ALT SERPL W/O P-5'-P-CCNC: 16 U/L (ref 10–44)
ANION GAP SERPL CALC-SCNC: 8 MMOL/L (ref 8–16)
AST SERPL-CCNC: 20 U/L (ref 10–40)
BASOPHILS # BLD AUTO: 0.03 K/UL (ref 0–0.2)
BASOPHILS NFR BLD: 0.6 % (ref 0–1.9)
BILIRUB SERPL-MCNC: 0.4 MG/DL (ref 0.1–1)
BUN SERPL-MCNC: 12 MG/DL (ref 6–20)
CALCIUM SERPL-MCNC: 9 MG/DL (ref 8.7–10.5)
CHLORIDE SERPL-SCNC: 107 MMOL/L (ref 95–110)
CO2 SERPL-SCNC: 26 MMOL/L (ref 23–29)
CREAT SERPL-MCNC: 0.7 MG/DL (ref 0.5–1.4)
DIFFERENTIAL METHOD BLD: ABNORMAL
EOSINOPHIL # BLD AUTO: 0.2 K/UL (ref 0–0.5)
EOSINOPHIL NFR BLD: 3.7 % (ref 0–8)
ERYTHROCYTE [DISTWIDTH] IN BLOOD BY AUTOMATED COUNT: 14.1 % (ref 11.5–14.5)
EST. GFR  (NO RACE VARIABLE): >60 ML/MIN/1.73 M^2
GLUCOSE SERPL-MCNC: 85 MG/DL (ref 70–110)
HCT VFR BLD AUTO: 39.8 % (ref 37–48.5)
HGB BLD-MCNC: 12.4 G/DL (ref 12–16)
IMM GRANULOCYTES # BLD AUTO: 0.02 K/UL (ref 0–0.04)
IMM GRANULOCYTES NFR BLD AUTO: 0.4 % (ref 0–0.5)
LYMPHOCYTES # BLD AUTO: 1.8 K/UL (ref 1–4.8)
LYMPHOCYTES NFR BLD: 34.3 % (ref 18–48)
MCH RBC QN AUTO: 29 PG (ref 27–31)
MCHC RBC AUTO-ENTMCNC: 31.2 G/DL (ref 32–36)
MCV RBC AUTO: 93 FL (ref 82–98)
MONOCYTES # BLD AUTO: 0.3 K/UL (ref 0.3–1)
MONOCYTES NFR BLD: 6.4 % (ref 4–15)
NEUTROPHILS # BLD AUTO: 2.9 K/UL (ref 1.8–7.7)
NEUTROPHILS NFR BLD: 54.6 % (ref 38–73)
NRBC BLD-RTO: 0 /100 WBC
PLATELET # BLD AUTO: 318 K/UL (ref 150–450)
PMV BLD AUTO: 10.6 FL (ref 9.2–12.9)
POTASSIUM SERPL-SCNC: 4.2 MMOL/L (ref 3.5–5.1)
PROT SERPL-MCNC: 7.4 G/DL (ref 6–8.4)
RBC # BLD AUTO: 4.28 M/UL (ref 4–5.4)
SODIUM SERPL-SCNC: 141 MMOL/L (ref 136–145)
WBC # BLD AUTO: 5.34 K/UL (ref 3.9–12.7)

## 2024-12-14 PROCEDURE — 36415 COLL VENOUS BLD VENIPUNCTURE: CPT

## 2024-12-14 PROCEDURE — 80053 COMPREHEN METABOLIC PANEL: CPT

## 2024-12-14 PROCEDURE — 85025 COMPLETE CBC W/AUTO DIFF WBC: CPT

## 2024-12-14 PROCEDURE — 82306 VITAMIN D 25 HYDROXY: CPT

## 2024-12-16 ENCOUNTER — PATIENT MESSAGE (OUTPATIENT)
Dept: PSYCHIATRY | Facility: CLINIC | Age: 49
End: 2024-12-16
Payer: COMMERCIAL

## 2024-12-16 LAB — 25(OH)D3+25(OH)D2 SERPL-MCNC: 28 NG/ML (ref 30–96)

## 2025-02-11 NOTE — TELEPHONE ENCOUNTER
Call attempt 1 for Betaseron refill and clinical follow up - LVM and MyChart message sent. Copay $0 at 004.     Jack Gaston, PharmD  Clinical Pharmacist   [facility]   P: [phone]     Patient/EMS

## 2025-02-22 ENCOUNTER — PATIENT MESSAGE (OUTPATIENT)
Dept: INTERNAL MEDICINE | Facility: CLINIC | Age: 50
End: 2025-02-22
Payer: COMMERCIAL

## 2025-05-12 ENCOUNTER — TELEPHONE (OUTPATIENT)
Dept: INTERNAL MEDICINE | Facility: CLINIC | Age: 50
End: 2025-05-12

## 2025-05-12 ENCOUNTER — OFFICE VISIT (OUTPATIENT)
Dept: INTERNAL MEDICINE | Facility: CLINIC | Age: 50
End: 2025-05-12
Payer: COMMERCIAL

## 2025-05-12 DIAGNOSIS — G35 MS (MULTIPLE SCLEROSIS): Chronic | ICD-10-CM

## 2025-05-12 DIAGNOSIS — I10 HYPERTENSION, UNSPECIFIED TYPE: ICD-10-CM

## 2025-05-12 DIAGNOSIS — Z79.899 HIGH RISK MEDICATION USE: ICD-10-CM

## 2025-05-12 DIAGNOSIS — E89.0 POSTOPERATIVE HYPOTHYROIDISM: Primary | ICD-10-CM

## 2025-05-12 PROCEDURE — 98006 SYNCH AUDIO-VIDEO EST MOD 30: CPT | Mod: 95,,, | Performed by: INTERNAL MEDICINE

## 2025-05-12 PROCEDURE — G2211 COMPLEX E/M VISIT ADD ON: HCPCS | Mod: 95,,, | Performed by: INTERNAL MEDICINE

## 2025-05-12 PROCEDURE — 4010F ACE/ARB THERAPY RXD/TAKEN: CPT | Mod: CPTII,95,, | Performed by: INTERNAL MEDICINE

## 2025-05-12 NOTE — PROGRESS NOTES
Subjective:      Patient ID: Nisha Mancuso is a 49 y.o. female.    Chief Complaint: No chief complaint on file.    HPI  History of Present Illness                 48 yo with Problem List[1]  Past Medical History:   Diagnosis Date    Hypertension     Multiple sclerosis     Thyroid cancer     Thyroid cancer 2011     The patient location is: [place]  The chief complaint leading to consultation is: hypothyroid     Visit type:  Audiovisual    Face to Face time with patient: 15   minutes of total time spent on the encounter, which includes face to face time and non-face to face time preparing to see the patient (eg, review of tests), Obtaining and/or reviewing separately obtained history, Documenting clinical information in the electronic or other health record, Independently interpreting results (not separately reported) and communicating results to the patient/family/caregiver, or Care coordination (not separately reported).         Each patient to whom he or she provides medical services by telemedicine is:  (1) informed of the relationship between the physician and patient and the respective role of any other health care provider with respect to management of the patient; and (2) notified that he or she may decline to receive medical services by telemedicine and may withdraw from such care at any time.    Notes:     Presents today to discuss hypothyroidism and hairloss.   She reports being diagnosed with alopecia areata with her dermatologist.  She is receiving injections and also prescribed minoxidil.  She reports that she is interested to double check her thyroid labs to see if low thyroid is contributing.  She also requests liver check due to long-term medications.    Review of Systems   Constitutional:  Negative for activity change.   HENT:  Negative for hearing loss, rhinorrhea and trouble swallowing.    Eyes:  Negative for discharge and visual disturbance.   Respiratory:  Negative for chest tightness and  wheezing.    Cardiovascular:  Negative for chest pain and palpitations.   Gastrointestinal:  Negative for blood in stool, constipation, diarrhea and vomiting.   Endocrine: Negative for polydipsia and polyuria.   Genitourinary:  Negative for difficulty urinating, dysuria, hematuria and menstrual problem.   Musculoskeletal:  Negative for arthralgias, joint swelling and neck pain.   Skin:  Negative for rash and wound.   Neurological:  Negative for weakness and headaches.   Psychiatric/Behavioral:  Negative for confusion and dysphoric mood.      Objective:   LMP  (LMP Unknown)     Physical Exam  Constitutional:       General: She is awake.      Appearance: Normal appearance.   HENT:      Head: Normocephalic and atraumatic.   Eyes:      Conjunctiva/sclera: Conjunctivae normal.   Pulmonary:      Effort: Pulmonary effort is normal.   Musculoskeletal:      Cervical back: Normal range of motion.   Neurological:      Mental Status: She is alert. Mental status is at baseline.   Psychiatric:         Mood and Affect: Mood normal.         Behavior: Behavior normal. Behavior is cooperative.         Thought Content: Thought content normal.         Judgment: Judgment normal.         Lab Results   Component Value Date    WBC 5.34 12/14/2024    HGB 12.4 12/14/2024    HGB 12.0 09/21/2024    HGB 13.5 03/27/2024    HCT 39.8 12/14/2024    MCV 93 12/14/2024    MCV 93 09/21/2024    MCV 89 03/27/2024     12/14/2024    CHOL 233 (H) 09/21/2024    TRIG 94 09/21/2024    HDL 64 09/21/2024    LDLCALC 150.2 09/21/2024    LDLCALC 161.2 (H) 05/25/2023    LDLCALC 123.2 05/24/2022    ALT 16 12/14/2024    AST 20 12/14/2024     12/14/2024    K 4.2 12/14/2024    CALCIUM 9.0 12/14/2024     12/14/2024    CO2 26 12/14/2024    BUN 12 12/14/2024    CREATININE 0.7 12/14/2024    CREATININE 0.8 09/21/2024    CREATININE 0.8 03/27/2024    EGFRNORACEVR >60.0 12/14/2024    EGFRNORACEVR >60.0 09/21/2024    EGFRNORACEVR >60 03/27/2024    TSH 0.612  09/21/2024    TSH 0.612 09/21/2024    TSH 1.86 09/25/2023    GLU 85 12/14/2024    HGBA1C 4.8 09/21/2024    HGBA1C 5.0 05/25/2023    HGBA1C 4.9 05/24/2022    [id number] 28 (L) 12/14/2024    [id number] 29 (L) 09/23/2023          The 10-year ASCVD risk score (Yuniel VELIZ, et al., 2019) is: 2%    Values used to calculate the score:      Age: 49 years      Sex: Female      Is Non- : No      Diabetic: No      Tobacco smoker: No      Systolic Blood Pressure: 132 mmHg      Is BP treated: Yes      HDL Cholesterol: 64 mg/dL      Total Cholesterol: 233 mg/dL     Assessment:     1. Postoperative hypothyroidism    2. MS (multiple sclerosis)    3. High risk medication use      Plan:   1. Postoperative hypothyroidism  -     TSH; Future; Expected date: 05/12/2025  -     T4, Free; Future; Expected date: 05/12/2025    2. MS (multiple sclerosis)  Overview:  Dx 2012    Orders:  -     Hepatic Function Panel; Future; Expected date: 06/12/2025    3. High risk medication use    Check hfp     4. Htn- cont current meds      There are no Patient Instructions on file for this visit.    Future Appointments   Date Time Provider Department Center   5/23/2025 11:40 AM Kit Apple MD [facility] [place]   8/22/2025  8:00 AM Kit Apple MD [facility]  [place]       Lab Frequency Next Occurrence   Mammo Digital Screening Bilat w/ Tony Once 07/17/2024   CBC Auto Differential Once 08/22/2025   Comprehensive Metabolic Panel Once 08/22/2025   Hemoglobin A1C Once 08/22/2025   Lipid Panel Once 08/22/2025       Follow up if symptoms worsen or fail to improve, for labs on saturday..         Visit today included increased complexity  addressed and managing the longitudinal care of the patient due to the serious and/or complex managed problem(s)              [1]   Patient Active Problem List  Diagnosis    MS (multiple sclerosis)    History of thyroid cancer    Postoperative hypothyroidism    Hypertension    DRE (stress  urinary incontinence, female)    Status post laparoscopic hysterectomy    Routine general medical examination at a health care facility    High risk medication use

## 2025-05-12 NOTE — TELEPHONE ENCOUNTER
Please apologize to patient for the difficulty with the virtual visit software.  However I think we had essentially concluded our visit.  I have ordered the labs that we discussed.  Please let me know if she is in need of anything else.  She would like to do her labs on Saturday.  Thank you

## 2025-05-16 ENCOUNTER — PATIENT MESSAGE (OUTPATIENT)
Dept: INTERNAL MEDICINE | Facility: CLINIC | Age: 50
End: 2025-05-16
Payer: COMMERCIAL

## 2025-05-16 DIAGNOSIS — Z79.899 HIGH RISK MEDICATION USE: Primary | ICD-10-CM

## 2025-05-17 ENCOUNTER — LAB VISIT (OUTPATIENT)
Dept: LAB | Facility: HOSPITAL | Age: 50
End: 2025-05-17
Attending: INTERNAL MEDICINE
Payer: COMMERCIAL

## 2025-05-17 DIAGNOSIS — E89.0 POSTOPERATIVE HYPOTHYROIDISM: ICD-10-CM

## 2025-05-17 DIAGNOSIS — G35 MS (MULTIPLE SCLEROSIS): Chronic | ICD-10-CM

## 2025-05-17 LAB
ALBUMIN SERPL BCP-MCNC: 3.8 G/DL (ref 3.5–5.2)
ALP SERPL-CCNC: 67 UNIT/L (ref 40–150)
ALT SERPL W/O P-5'-P-CCNC: 15 UNIT/L (ref 10–44)
AST SERPL-CCNC: 18 UNIT/L (ref 11–45)
BILIRUB DIRECT SERPL-MCNC: 0.2 MG/DL (ref 0.1–0.3)
BILIRUB SERPL-MCNC: 0.6 MG/DL (ref 0.1–1)
PROT SERPL-MCNC: 7.4 GM/DL (ref 6–8.4)
T4 FREE SERPL-MCNC: 1.16 NG/DL (ref 0.71–1.51)
TSH SERPL-ACNC: 2.54 UIU/ML (ref 0.4–4)

## 2025-05-17 PROCEDURE — 80076 HEPATIC FUNCTION PANEL: CPT

## 2025-05-17 PROCEDURE — 84439 ASSAY OF FREE THYROXINE: CPT

## 2025-05-17 PROCEDURE — 36415 COLL VENOUS BLD VENIPUNCTURE: CPT

## 2025-05-17 PROCEDURE — 84443 ASSAY THYROID STIM HORMONE: CPT

## 2025-05-19 ENCOUNTER — RESULTS FOLLOW-UP (OUTPATIENT)
Dept: INTERNAL MEDICINE | Facility: CLINIC | Age: 50
End: 2025-05-19

## 2025-05-23 ENCOUNTER — NURSE TRIAGE (OUTPATIENT)
Dept: ADMINISTRATIVE | Facility: CLINIC | Age: 50
End: 2025-05-23
Payer: COMMERCIAL

## 2025-05-23 NOTE — TELEPHONE ENCOUNTER
"Patient states c/o "extreme" lower abdominal pain approximately two (2) days ago. Patient states she was unable to sit or stand for 30 minutes during painful episode and symptoms were relieved by taking baking soda and water and an  OTC aid named "Mylex". Patient states she fell asleep after using her home remedies. Patient rated pain at a level of 20/10 and says her bowel movements were black. Patient states her lower abdomen "feels bruised" at this time and her stools continue to be black.    Patient advised to Go to ED Now for evaluation and to follow up with her PCP. Patient also advised to contact the [facility] for any worsening symptoms. Patient states understanding of care advice.     Reason for Disposition   Black or tarry bowel movements  (Exception: Chronic-unchanged black-grey BMs AND is taking iron pills or Pepto-Bismol.)    Additional Information   Negative: Passed out (e.g., fainted, lost consciousness, blacked out and was not responding)   Negative: Shock suspected (e.g., cold/pale/clammy skin, too weak to stand, low BP, rapid pulse)   Negative: Sounds like a life-threatening emergency to the triager   Negative: Followed an abdomen (stomach) injury   Negative: Chest pain   Negative: Abdominal pain and pregnant < 20 weeks   Negative: Abdominal pain and pregnant 20 or more weeks   Negative: Pain is mainly in upper abdomen (if needed ask: 'is it mainly above the belly button?')   Negative: Abdomen bloating or swelling are main symptoms   Negative: SEVERE abdominal pain (e.g., excruciating)   Negative: Vomiting red blood or black (coffee ground) material   Negative: Blood in bowel movements  (Exception: Blood on surface of BM with constipation.)    Protocols used: Abdominal Pain - Female-A-[place]    "

## 2025-06-04 ENCOUNTER — OFFICE VISIT (OUTPATIENT)
Dept: SURGERY | Facility: CLINIC | Age: 50
End: 2025-06-04
Payer: COMMERCIAL

## 2025-06-04 VITALS
DIASTOLIC BLOOD PRESSURE: 86 MMHG | HEIGHT: 65 IN | SYSTOLIC BLOOD PRESSURE: 146 MMHG | WEIGHT: 169.56 LBS | BODY MASS INDEX: 28.25 KG/M2 | HEART RATE: 67 BPM | OXYGEN SATURATION: 97 %

## 2025-06-04 DIAGNOSIS — K57.90 DIVERTICULOSIS: ICD-10-CM

## 2025-06-04 DIAGNOSIS — K59.00 CONSTIPATION, UNSPECIFIED CONSTIPATION TYPE: Primary | ICD-10-CM

## 2025-06-04 PROCEDURE — 3077F SYST BP >= 140 MM HG: CPT | Mod: CPTII,S$GLB,, | Performed by: STUDENT IN AN ORGANIZED HEALTH CARE EDUCATION/TRAINING PROGRAM

## 2025-06-04 PROCEDURE — 99999 PR PBB SHADOW E&M-EST. PATIENT-LVL III: CPT | Mod: PBBFAC,,, | Performed by: STUDENT IN AN ORGANIZED HEALTH CARE EDUCATION/TRAINING PROGRAM

## 2025-06-04 PROCEDURE — 4010F ACE/ARB THERAPY RXD/TAKEN: CPT | Mod: CPTII,S$GLB,, | Performed by: STUDENT IN AN ORGANIZED HEALTH CARE EDUCATION/TRAINING PROGRAM

## 2025-06-04 PROCEDURE — 3008F BODY MASS INDEX DOCD: CPT | Mod: CPTII,S$GLB,, | Performed by: STUDENT IN AN ORGANIZED HEALTH CARE EDUCATION/TRAINING PROGRAM

## 2025-06-04 PROCEDURE — 3079F DIAST BP 80-89 MM HG: CPT | Mod: CPTII,S$GLB,, | Performed by: STUDENT IN AN ORGANIZED HEALTH CARE EDUCATION/TRAINING PROGRAM

## 2025-06-04 PROCEDURE — 99204 OFFICE O/P NEW MOD 45 MIN: CPT | Mod: S$GLB,,, | Performed by: STUDENT IN AN ORGANIZED HEALTH CARE EDUCATION/TRAINING PROGRAM

## 2025-06-04 PROCEDURE — 1159F MED LIST DOCD IN RCRD: CPT | Mod: CPTII,S$GLB,, | Performed by: STUDENT IN AN ORGANIZED HEALTH CARE EDUCATION/TRAINING PROGRAM

## 2025-06-14 ENCOUNTER — LAB VISIT (OUTPATIENT)
Dept: LAB | Facility: HOSPITAL | Age: 50
End: 2025-06-14
Attending: INTERNAL MEDICINE
Payer: COMMERCIAL

## 2025-06-14 DIAGNOSIS — Z79.899 HIGH RISK MEDICATION USE: ICD-10-CM

## 2025-06-14 LAB
ALBUMIN SERPL BCP-MCNC: 3.8 G/DL (ref 3.5–5.2)
ALP SERPL-CCNC: 68 UNIT/L (ref 40–150)
ALT SERPL W/O P-5'-P-CCNC: 20 UNIT/L (ref 10–44)
AST SERPL-CCNC: 18 UNIT/L (ref 11–45)
BILIRUB DIRECT SERPL-MCNC: 0.1 MG/DL (ref 0.1–0.3)
BILIRUB SERPL-MCNC: 0.3 MG/DL (ref 0.1–1)
PROT SERPL-MCNC: 7.2 GM/DL (ref 6–8.4)

## 2025-06-14 PROCEDURE — 36415 COLL VENOUS BLD VENIPUNCTURE: CPT

## 2025-06-14 PROCEDURE — 80076 HEPATIC FUNCTION PANEL: CPT

## 2025-06-29 ENCOUNTER — RESULTS FOLLOW-UP (OUTPATIENT)
Dept: INTERNAL MEDICINE | Facility: CLINIC | Age: 50
End: 2025-06-29

## 2025-08-16 ENCOUNTER — LAB VISIT (OUTPATIENT)
Dept: LAB | Facility: HOSPITAL | Age: 50
End: 2025-08-16
Attending: INTERNAL MEDICINE
Payer: COMMERCIAL

## 2025-08-16 DIAGNOSIS — Z00.00 ROUTINE GENERAL MEDICAL EXAMINATION AT A HEALTH CARE FACILITY: ICD-10-CM

## 2025-08-16 LAB
ABSOLUTE EOSINOPHIL (OHS): 0.1 K/UL
ABSOLUTE MONOCYTE (OHS): 0.47 K/UL (ref 0.3–1)
ABSOLUTE NEUTROPHIL COUNT (OHS): 3.48 K/UL (ref 1.8–7.7)
ALBUMIN SERPL BCP-MCNC: 4.1 G/DL (ref 3.5–5.2)
ALP SERPL-CCNC: 80 UNIT/L (ref 40–150)
ALT SERPL W/O P-5'-P-CCNC: 22 UNIT/L (ref 0–55)
ANION GAP (OHS): 9 MMOL/L (ref 8–16)
AST SERPL-CCNC: 23 UNIT/L (ref 0–50)
BASOPHILS # BLD AUTO: 0.02 K/UL
BASOPHILS NFR BLD AUTO: 0.3 %
BILIRUB SERPL-MCNC: 0.5 MG/DL (ref 0.1–1)
BUN SERPL-MCNC: 17 MG/DL (ref 6–20)
CALCIUM SERPL-MCNC: 9.3 MG/DL (ref 8.7–10.5)
CHLORIDE SERPL-SCNC: 106 MMOL/L (ref 95–110)
CHOLEST SERPL-MCNC: 315 MG/DL (ref 120–199)
CHOLEST/HDLC SERPL: 4.6 {RATIO} (ref 2–5)
CO2 SERPL-SCNC: 26 MMOL/L (ref 23–29)
CREAT SERPL-MCNC: 0.8 MG/DL (ref 0.5–1.4)
EAG (OHS): 91 MG/DL (ref 68–131)
ERYTHROCYTE [DISTWIDTH] IN BLOOD BY AUTOMATED COUNT: 13.5 % (ref 11.5–14.5)
GFR SERPLBLD CREATININE-BSD FMLA CKD-EPI: >60 ML/MIN/1.73/M2
GLUCOSE SERPL-MCNC: 78 MG/DL (ref 70–110)
HBA1C MFR BLD: 4.8 % (ref 4–5.6)
HCT VFR BLD AUTO: 42.6 % (ref 37–48.5)
HDLC SERPL-MCNC: 69 MG/DL (ref 40–75)
HDLC SERPL: 21.9 % (ref 20–50)
HGB BLD-MCNC: 13.5 GM/DL (ref 12–16)
IMM GRANULOCYTES # BLD AUTO: 0.01 K/UL (ref 0–0.04)
IMM GRANULOCYTES NFR BLD AUTO: 0.2 % (ref 0–0.5)
LDLC SERPL CALC-MCNC: 231.6 MG/DL (ref 63–159)
LYMPHOCYTES # BLD AUTO: 1.7 K/UL (ref 1–4.8)
MCH RBC QN AUTO: 29 PG (ref 27–31)
MCHC RBC AUTO-ENTMCNC: 31.7 G/DL (ref 32–36)
MCV RBC AUTO: 91 FL (ref 82–98)
NONHDLC SERPL-MCNC: 246 MG/DL
NUCLEATED RBC (/100WBC) (OHS): 0 /100 WBC
PLATELET # BLD AUTO: 367 K/UL (ref 150–450)
PMV BLD AUTO: 10.5 FL (ref 9.2–12.9)
POTASSIUM SERPL-SCNC: 4.4 MMOL/L (ref 3.5–5.1)
PROT SERPL-MCNC: 7.9 GM/DL (ref 6–8.4)
RBC # BLD AUTO: 4.66 M/UL (ref 4–5.4)
RELATIVE EOSINOPHIL (OHS): 1.7 %
RELATIVE LYMPHOCYTE (OHS): 29.4 % (ref 18–48)
RELATIVE MONOCYTE (OHS): 8.1 % (ref 4–15)
RELATIVE NEUTROPHIL (OHS): 60.3 % (ref 38–73)
SODIUM SERPL-SCNC: 141 MMOL/L (ref 136–145)
TRIGL SERPL-MCNC: 72 MG/DL (ref 30–150)
WBC # BLD AUTO: 5.78 K/UL (ref 3.9–12.7)

## 2025-08-16 PROCEDURE — 36415 COLL VENOUS BLD VENIPUNCTURE: CPT

## 2025-08-16 PROCEDURE — 83036 HEMOGLOBIN GLYCOSYLATED A1C: CPT

## 2025-08-16 PROCEDURE — 85025 COMPLETE CBC W/AUTO DIFF WBC: CPT

## 2025-08-16 PROCEDURE — 80053 COMPREHEN METABOLIC PANEL: CPT

## 2025-08-16 PROCEDURE — 80061 LIPID PANEL: CPT

## (undated) DEVICE — CLOSURE SKIN STERI STRIP 1/2X4

## (undated) DEVICE — Device

## (undated) DEVICE — SET PNEUMOCLEAR HEAT HUM SE HF

## (undated) DEVICE — ELECTRODE REM PLYHSV RETURN 9

## (undated) DEVICE — SOL ELECTROLUBE ANTI-STIC

## (undated) DEVICE — KIT ANTIFOG

## (undated) DEVICE — DRAPE UINDERBUT GRAD PCH

## (undated) DEVICE — ADHESIVE MASTISOL VIAL 48/BX

## (undated) DEVICE — POSITIONER HEAD DONUT 9IN FOAM

## (undated) DEVICE — DRAPE STERI LONG

## (undated) DEVICE — COVER TIP CURVED SCISSORS XI

## (undated) DEVICE — DRAPE LAVH SURG 109X109X100IN

## (undated) DEVICE — SUPPORT ULNA NERVE PROTECTOR

## (undated) DEVICE — SYR 3CC LUER LOC

## (undated) DEVICE — SOL NS 1000CC

## (undated) DEVICE — TUBING SUCTION STRAIGHT .25X20

## (undated) DEVICE — GLOVE SURGICAL LATEX SZ 7

## (undated) DEVICE — TOWEL OR DISP STRL BLUE 4/PK

## (undated) DEVICE — COVER PROXIMA MAYO STAND

## (undated) DEVICE — DRAPE COLUMN DAVINCI XI

## (undated) DEVICE — APPLICATOR CHLORAPREP ORN 26ML

## (undated) DEVICE — NDL PNEUMO INSUFFLATI 120MM

## (undated) DEVICE — OCCLUDER COLPO-PNEUMO STERILE

## (undated) DEVICE — GLOVE SURG BIOGEL LATEX SZ 7.5

## (undated) DEVICE — SUT VICRYL PLUS 0 CT1 36IN

## (undated) DEVICE — IRRIGATOR ENDOSCOPY DISP.

## (undated) DEVICE — SOL 9P NACL IRR PIC IL

## (undated) DEVICE — PACK BASIC SETUP SC BR

## (undated) DEVICE — SUT MONOCRYL 4-0 PS-1 UND

## (undated) DEVICE — SUT V-LOC 180 ABD 2/0 GS-21

## (undated) DEVICE — COVER LIGHT HANDLE 80/CA

## (undated) DEVICE — GOWN POLY REINF BRTH SLV XL

## (undated) DEVICE — TRAY SKIN SCRUB WET PREMIUM

## (undated) DEVICE — TROCAR ENDOPATH XCEL 5X100MM

## (undated) DEVICE — SYR 50CC LL

## (undated) DEVICE — DRAPE ARM DAVINCI XI

## (undated) DEVICE — OBTURATOR BLADELESS 8MM XI CLR

## (undated) DEVICE — SEAL UNIVERSAL 5MM-8MM XI

## (undated) DEVICE — SYR 10CC LUER LOCK